# Patient Record
Sex: MALE | Race: WHITE | NOT HISPANIC OR LATINO | Employment: OTHER | URBAN - METROPOLITAN AREA
[De-identification: names, ages, dates, MRNs, and addresses within clinical notes are randomized per-mention and may not be internally consistent; named-entity substitution may affect disease eponyms.]

---

## 2017-06-16 ENCOUNTER — HOSPITAL ENCOUNTER (OUTPATIENT)
Dept: OTHER | Facility: HOSPITAL | Age: 42
Discharge: HOME | End: 2017-06-16
Attending: UROLOGY | Admitting: UROLOGY

## 2017-10-26 ENCOUNTER — HOSPITAL ENCOUNTER (OUTPATIENT)
Dept: OTHER | Facility: HOSPITAL | Age: 42
Discharge: HOME | End: 2017-10-26
Attending: FAMILY MEDICINE | Admitting: FAMILY MEDICINE

## 2017-12-11 ENCOUNTER — CONVERSION ENCOUNTER (OUTPATIENT)
Dept: FAMILY MEDICINE CLINIC | Facility: CLINIC | Age: 42
End: 2017-12-11

## 2017-12-11 LAB
25(OH)D3 SERPL-MCNC: 29 NG/ML (ref 30–100)
ALBUMIN SERPL-MCNC: 4 G/DL (ref 3.6–5.1)
ALP SERPL-CCNC: 61 U/L (ref 40–115)
ALT SERPL-CCNC: 61 U/L (ref 9–46)
AST SERPL-CCNC: 28 U/L (ref 10–40)
BASOPHILS # BLD AUTO: 68 CELLS/UL (ref 0–200)
BASOPHILS NFR BLD AUTO: 1.1 %
BILIRUB SERPL-MCNC: 0.5 MG/DL (ref 0.2–1.2)
BUN SERPL-MCNC: 10 MG/DL (ref 7–25)
BUN/CREAT SERPL: ABNORMAL (CALC) (ref 6–22)
CALCIUM SERPL-MCNC: 8.7 MG/DL (ref 8.6–10.3)
CHLORIDE SERPL-SCNC: 105 MMOL/L (ref 98–110)
CHOLEST SERPL-MCNC: 211 MG/DL
CHOLEST/HDLC SERPL: 4.8 (CALC)
CONV CO2: 28 MMOL/L (ref 20–31)
CONV TOTAL PROTEIN: 6.4 G/DL (ref 6.1–8.1)
CREAT UR-MCNC: 0.83 MG/DL (ref 0.6–1.35)
EOSINOPHIL # BLD AUTO: 149 CELLS/UL (ref 15–500)
EOSINOPHIL # BLD AUTO: 2.4 %
ERYTHROCYTE [DISTWIDTH] IN BLOOD BY AUTOMATED COUNT: 12.8 % (ref 11–15)
FOLATE SERPL-MCNC: 18.7 NG/ML
GLOBULIN UR ELPH-MCNC: 2.4 MG/DL (ref 1.9–3.7)
GLUCOSE UR QL: 113 MG/DL (ref 65–99)
HCT VFR BLD AUTO: 46.4 % (ref 38.5–50)
HDLC SERPL-MCNC: 44 MG/DL
HGB BLD-MCNC: 15.7 G/DL (ref 13.2–17.1)
INSULIN SERPL-ACNC: 1.7 (CALC) (ref 1–2.5)
LDLC SERPL CALC-MCNC: 130 MG/DL
LYMPHOCYTES # BLD AUTO: 2418 CELLS/UL (ref 850–3900)
LYMPHOCYTES NFR BLD AUTO: 39 %
MCH RBC QN AUTO: 30.1 PG (ref 27–33)
MCHC RBC AUTO-ENTMCNC: 33.8 G/DL (ref 32–36)
MCV RBC AUTO: 88.9 FL (ref 80–100)
MONOCYTES # BLD AUTO: 515 CELLS/UL (ref 200–950)
MONOCYTES NFR BLD AUTO: 8.3 %
NEUTROPHILS # BLD AUTO: 3050 CELLS/UL (ref 1500–7800)
NEUTROPHILS NFR BLD AUTO: 49.2 %
NONHDLC SERPL-MCNC: 167 MG/DL
PLATELET # BLD AUTO: 212 10*3/UL (ref 140–400)
PMV BLD AUTO: 8.9 FL (ref 7.5–12.5)
POTASSIUM SERPL-SCNC: 4 MMOL/L (ref 3.5–5.3)
RBC # BLD AUTO: 5.22 MILLION/UL (ref 4.2–5.8)
SODIUM SERPL-SCNC: 141 MMOL/L (ref 135–146)
T4 FREE SERPL-MCNC: 1.1 NG/DL (ref 0.8–1.8)
TESTOST FREE SERPL-MCNC: 43.2 PG/ML (ref 35–155)
TESTOST SERPL-MCNC: 485 NG/DL (ref 250–1100)
TRIGL SERPL-MCNC: 231 MG/DL
TSH SERPL-ACNC: 1.91 MIU/L (ref 0.4–4.5)
VIT B12 SERPL-MCNC: 504 PG/ML (ref 200–1100)
WBC # BLD AUTO: 6.2 10*3/UL (ref 3.8–10.8)

## 2019-07-09 ENCOUNTER — OFFICE VISIT (OUTPATIENT)
Dept: FAMILY MEDICINE CLINIC | Facility: CLINIC | Age: 44
End: 2019-07-09

## 2019-07-09 VITALS
RESPIRATION RATE: 19 BRPM | SYSTOLIC BLOOD PRESSURE: 138 MMHG | HEIGHT: 73 IN | BODY MASS INDEX: 41.75 KG/M2 | TEMPERATURE: 97.4 F | OXYGEN SATURATION: 95 % | HEART RATE: 93 BPM | DIASTOLIC BLOOD PRESSURE: 76 MMHG | WEIGHT: 315 LBS

## 2019-07-09 DIAGNOSIS — I10 ESSENTIAL HYPERTENSION: ICD-10-CM

## 2019-07-09 DIAGNOSIS — L57.0 AK (ACTINIC KERATOSIS): Primary | ICD-10-CM

## 2019-07-09 PROBLEM — I65.29 CAROTID ARTERY STENOSIS: Status: ACTIVE | Noted: 2019-07-09

## 2019-07-09 PROBLEM — F41.9 ANXIETY: Status: ACTIVE | Noted: 2019-07-09

## 2019-07-09 PROBLEM — M19.90 OSTEOARTHRITIS: Status: ACTIVE | Noted: 2019-07-09

## 2019-07-09 PROBLEM — K21.9 GERD (GASTROESOPHAGEAL REFLUX DISEASE): Status: ACTIVE | Noted: 2019-07-09

## 2019-07-09 PROCEDURE — 99213 OFFICE O/P EST LOW 20 MIN: CPT | Performed by: FAMILY MEDICINE

## 2019-07-09 PROCEDURE — 17000 DESTRUCT PREMALG LESION: CPT | Performed by: FAMILY MEDICINE

## 2019-07-09 RX ORDER — CETIRIZINE HYDROCHLORIDE 5 MG/1
5 TABLET ORAL
COMMUNITY
Start: 2016-04-27

## 2019-07-09 RX ORDER — ALBUTEROL SULFATE 90 UG/1
1 AEROSOL, METERED RESPIRATORY (INHALATION)
COMMUNITY
Start: 2017-06-21 | End: 2022-04-20

## 2019-07-09 RX ORDER — RANITIDINE 150 MG/1
TABLET ORAL
COMMUNITY
Start: 2018-04-06

## 2019-07-09 RX ORDER — SIMVASTATIN 20 MG
20 TABLET ORAL DAILY
Refills: 3 | COMMUNITY
Start: 2019-05-11 | End: 2019-11-03 | Stop reason: SDUPTHER

## 2019-07-09 RX ORDER — CARVEDILOL 25 MG/1
25 TABLET ORAL 2 TIMES DAILY
Refills: 2 | COMMUNITY
Start: 2019-05-11 | End: 2019-08-18 | Stop reason: SDUPTHER

## 2019-07-09 NOTE — PROGRESS NOTES
Subjective   Asad Odom is a 43 y.o. male.     Chief Complaint   Patient presents with   • Abrasion     spot on nose       Rash   This is a recurrent problem. The current episode started more than 1 year ago. The problem has been gradually worsening since onset. The affected locations include the face. The rash is characterized by draining and redness. He was exposed to nothing. Pertinent negatives include no shortness of breath.            I personally reviewed and updated the patient's allergies, medications, problem list, and past medical, surgical, social, and family history.     History reviewed. No pertinent family history.    Social History     Tobacco Use   • Smoking status: Former Smoker   • Tobacco comment: quit 8 years ago   Substance Use Topics   • Alcohol use: Yes     Frequency: Monthly or less   • Drug use: No       History reviewed. No pertinent surgical history.    Patient Active Problem List   Diagnosis   • Asthma   • Dyslipidemia   • Elevated serum tryptase   • Hypertension   • Mast cell disorder   • Nephrolithiasis   • Anxiety   • GERD (gastroesophageal reflux disease)   • Osteoarthritis   • Carotid artery stenosis   • AK (actinic keratosis)         Current Outpatient Medications:   •  albuterol sulfate  (90 Base) MCG/ACT inhaler, Inhale 1 puff., Disp: , Rfl:   •  aspirin 81 MG tablet, Take 81 mg by mouth., Disp: , Rfl:   •  carvedilol (COREG) 25 MG tablet, Take 25 mg by mouth 2 (Two) Times a Day., Disp: , Rfl: 2  •  cetirizine (zyrTEC) 5 MG tablet, Take 5 mg by mouth., Disp: , Rfl:   •  raNITIdine (ZANTAC) 150 MG tablet, TAKE 1 TABLET BY MOUTH TWICE DAILY, Disp: , Rfl:   •  simvastatin (ZOCOR) 20 MG tablet, Take 20 mg by mouth Daily., Disp: , Rfl: 3         Review of Systems   Constitutional: Negative for chills and diaphoresis.   Eyes: Negative for visual disturbance.   Respiratory: Negative for shortness of breath.    Cardiovascular: Negative for chest pain and palpitations.  "  Gastrointestinal: Negative for abdominal pain and nausea.   Endocrine: Negative for polydipsia and polyphagia.   Musculoskeletal: Negative for neck stiffness.   Skin: Positive for rash. Negative for color change and pallor.   Neurological: Negative for seizures and syncope.   Hematological: Negative for adenopathy.       Objective   /76   Pulse 93   Temp 97.4 °F (36.3 °C)   Resp 19   Ht 185.4 cm (73\")   Wt (!) 148 kg (326 lb 6.4 oz)   SpO2 95%   BMI 43.06 kg/m²   Physical Exam   Constitutional: He is oriented to person, place, and time. He appears well-developed and well-nourished.   Cardiovascular: Normal rate, regular rhythm, S1 normal, S2 normal, normal heart sounds, intact distal pulses and normal pulses. Exam reveals no gallop and no friction rub.   No murmur heard.  Pulmonary/Chest: Effort normal and breath sounds normal. No accessory muscle usage or stridor. He has no decreased breath sounds. He has no wheezes. He has no rhonchi. He has no rales.   Abdominal: Soft. Normal appearance, normal aorta and bowel sounds are normal. He exhibits no distension, no pulsatile midline mass and no mass. There is no hepatosplenomegaly. There is no tenderness. There is no rigidity, no rebound, no guarding, no CVA tenderness and negative Randolph's sign. No hernia.   Neurological: He is alert and oriented to person, place, and time. Coordination and gait normal.   Skin: Skin is warm and dry. Turgor is normal. He is not diaphoretic. No pallor.   Small ak nose     Cryotherapy, Skin Lesion  Date/Time: 7/13/2019 10:23 AM  Performed by: Pablo Noyola MD  Authorized by: Pablo Noyola MD   Preparation: Patient was prepped and draped in the usual sterile fashion.  Local anesthesia used: no    Anesthesia:  Local anesthesia used: no    Sedation:  Patient sedated: no    Patient tolerance: Patient tolerated the procedure well with no immediate complications              Assessment/Plan   Problem List Items Addressed This " Visit        Cardiovascular and Mediastinum    Hypertension    Relevant Medications    carvedilol (COREG) 25 MG tablet       Musculoskeletal and Integument    AK (actinic keratosis) - Primary    Current Assessment & Plan     Frozen today  follow up recheck  Consider repeat freezing, resection, if if persistent symptoms                    Expected course, medications, and adverse effects discussed.  Call or return if worsening or persistent symptoms.

## 2019-08-18 DIAGNOSIS — I65.29 STENOSIS OF CAROTID ARTERY, UNSPECIFIED LATERALITY: Primary | ICD-10-CM

## 2019-08-20 RX ORDER — CARVEDILOL 25 MG/1
TABLET ORAL
Qty: 60 TABLET | Refills: 0 | Status: SHIPPED | OUTPATIENT
Start: 2019-08-20 | End: 2019-09-15 | Stop reason: SDUPTHER

## 2019-09-15 DIAGNOSIS — I65.29 STENOSIS OF CAROTID ARTERY, UNSPECIFIED LATERALITY: ICD-10-CM

## 2019-09-16 RX ORDER — CARVEDILOL 25 MG/1
TABLET ORAL
Qty: 60 TABLET | Refills: 0 | Status: SHIPPED | OUTPATIENT
Start: 2019-09-16 | End: 2019-10-20 | Stop reason: SDUPTHER

## 2019-10-16 ENCOUNTER — OFFICE VISIT (OUTPATIENT)
Dept: FAMILY MEDICINE CLINIC | Facility: CLINIC | Age: 44
End: 2019-10-16

## 2019-10-16 VITALS
DIASTOLIC BLOOD PRESSURE: 72 MMHG | TEMPERATURE: 98.6 F | SYSTOLIC BLOOD PRESSURE: 140 MMHG | OXYGEN SATURATION: 96 % | HEIGHT: 73 IN | RESPIRATION RATE: 18 BRPM | HEART RATE: 78 BPM | BODY MASS INDEX: 41.75 KG/M2 | WEIGHT: 315 LBS

## 2019-10-16 DIAGNOSIS — E66.3 OVERWEIGHT: ICD-10-CM

## 2019-10-16 DIAGNOSIS — M19.90 ARTHRITIS: ICD-10-CM

## 2019-10-16 DIAGNOSIS — E78.5 DYSLIPIDEMIA: ICD-10-CM

## 2019-10-16 DIAGNOSIS — I10 ESSENTIAL HYPERTENSION: ICD-10-CM

## 2019-10-16 DIAGNOSIS — R79.89 ELEVATED LIVER FUNCTION TESTS: ICD-10-CM

## 2019-10-16 DIAGNOSIS — Z00.01 ANNUAL VISIT FOR GENERAL ADULT MEDICAL EXAMINATION WITH ABNORMAL FINDINGS: Primary | ICD-10-CM

## 2019-10-16 DIAGNOSIS — B96.89 ACUTE BACTERIAL BRONCHITIS: ICD-10-CM

## 2019-10-16 DIAGNOSIS — D47.09 MAST CELL DISORDER: ICD-10-CM

## 2019-10-16 DIAGNOSIS — J45.20 MILD INTERMITTENT ASTHMA WITHOUT COMPLICATION: ICD-10-CM

## 2019-10-16 DIAGNOSIS — J20.8 ACUTE BACTERIAL BRONCHITIS: ICD-10-CM

## 2019-10-16 DIAGNOSIS — Z87.891 HISTORY OF TOBACCO USE: ICD-10-CM

## 2019-10-16 LAB
BILIRUB BLD-MCNC: NEGATIVE MG/DL
CLARITY, POC: CLEAR
COLOR UR: YELLOW
GLUCOSE UR STRIP-MCNC: NEGATIVE MG/DL
KETONES UR QL: NEGATIVE
LEUKOCYTE EST, POC: NEGATIVE
NITRITE UR-MCNC: NEGATIVE MG/ML
PH UR: 5 [PH] (ref 5–8)
PROT UR STRIP-MCNC: NEGATIVE MG/DL
RBC # UR STRIP: ABNORMAL /UL
SP GR UR: 1.01 (ref 1–1.03)
UROBILINOGEN UR QL: NORMAL

## 2019-10-16 PROCEDURE — 99213 OFFICE O/P EST LOW 20 MIN: CPT | Performed by: FAMILY MEDICINE

## 2019-10-16 PROCEDURE — 96372 THER/PROPH/DIAG INJ SC/IM: CPT | Performed by: FAMILY MEDICINE

## 2019-10-16 PROCEDURE — 99396 PREV VISIT EST AGE 40-64: CPT | Performed by: FAMILY MEDICINE

## 2019-10-16 PROCEDURE — 81003 URINALYSIS AUTO W/O SCOPE: CPT | Performed by: FAMILY MEDICINE

## 2019-10-16 RX ORDER — CEPHALEXIN 500 MG/1
500 CAPSULE ORAL 3 TIMES DAILY
Qty: 30 CAPSULE | Refills: 0 | Status: SHIPPED | OUTPATIENT
Start: 2019-10-16 | End: 2019-10-26

## 2019-10-16 RX ORDER — METHYLPREDNISOLONE ACETATE 80 MG/ML
80 INJECTION, SUSPENSION INTRA-ARTICULAR; INTRALESIONAL; INTRAMUSCULAR; SOFT TISSUE ONCE
Status: COMPLETED | OUTPATIENT
Start: 2019-10-16 | End: 2019-10-16

## 2019-10-16 RX ADMIN — METHYLPREDNISOLONE ACETATE 80 MG: 80 INJECTION, SUSPENSION INTRA-ARTICULAR; INTRALESIONAL; INTRAMUSCULAR; SOFT TISSUE at 09:47

## 2019-10-18 LAB
ALBUMIN SERPL-MCNC: 4.3 G/DL (ref 3.5–5.5)
ALBUMIN/GLOB SERPL: 1.8 {RATIO} (ref 1.2–2.2)
ALP SERPL-CCNC: 83 IU/L (ref 39–117)
ALT SERPL-CCNC: 76 IU/L (ref 0–44)
ANA TITR SER IF: NEGATIVE {TITER}
AST SERPL-CCNC: 50 IU/L (ref 0–40)
BASOPHILS # BLD AUTO: 0.1 X10E3/UL (ref 0–0.2)
BASOPHILS NFR BLD AUTO: 1 %
BILIRUB SERPL-MCNC: 0.6 MG/DL (ref 0–1.2)
BUN SERPL-MCNC: 12 MG/DL (ref 6–24)
BUN/CREAT SERPL: 13 (ref 9–20)
CALCIUM SERPL-MCNC: 9.3 MG/DL (ref 8.7–10.2)
CHLORIDE SERPL-SCNC: 102 MMOL/L (ref 96–106)
CHOLEST SERPL-MCNC: 203 MG/DL (ref 100–199)
CHOLEST/HDLC SERPL: 6.5 RATIO (ref 0–5)
CO2 SERPL-SCNC: 27 MMOL/L (ref 20–29)
CREAT SERPL-MCNC: 0.91 MG/DL (ref 0.76–1.27)
CRP SERPL-MCNC: 7 MG/L (ref 0–10)
EOSINOPHIL # BLD AUTO: 0.3 X10E3/UL (ref 0–0.4)
EOSINOPHIL NFR BLD AUTO: 4 %
ERYTHROCYTE [DISTWIDTH] IN BLOOD BY AUTOMATED COUNT: 13.7 % (ref 12.3–15.4)
GLOBULIN SER CALC-MCNC: 2.4 G/DL (ref 1.5–4.5)
GLUCOSE SERPL-MCNC: 167 MG/DL (ref 65–99)
HCT VFR BLD AUTO: 47.5 % (ref 37.5–51)
HDLC SERPL-MCNC: 31 MG/DL
HGB BLD-MCNC: 16 G/DL (ref 13–17.7)
IMM GRANULOCYTES # BLD AUTO: 0 X10E3/UL (ref 0–0.1)
IMM GRANULOCYTES NFR BLD AUTO: 0 %
LDLC SERPL CALC-MCNC: ABNORMAL MG/DL (ref 0–99)
LYMPHOCYTES # BLD AUTO: 2.5 X10E3/UL (ref 0.7–3.1)
LYMPHOCYTES NFR BLD AUTO: 37 %
MCH RBC QN AUTO: 30.5 PG (ref 26.6–33)
MCHC RBC AUTO-ENTMCNC: 33.7 G/DL (ref 31.5–35.7)
MCV RBC AUTO: 91 FL (ref 79–97)
MONOCYTES # BLD AUTO: 0.6 X10E3/UL (ref 0.1–0.9)
MONOCYTES NFR BLD AUTO: 9 %
NEUTROPHILS # BLD AUTO: 3.4 X10E3/UL (ref 1.4–7)
NEUTROPHILS NFR BLD AUTO: 49 %
PLATELET # BLD AUTO: 194 X10E3/UL (ref 150–450)
POTASSIUM SERPL-SCNC: 4.6 MMOL/L (ref 3.5–5.2)
PROT SERPL-MCNC: 6.7 G/DL (ref 6–8.5)
RBC # BLD AUTO: 5.25 X10E6/UL (ref 4.14–5.8)
RHEUMATOID FACT SERPL-ACNC: <10 IU/ML (ref 0–13.9)
SODIUM SERPL-SCNC: 143 MMOL/L (ref 134–144)
TRIGL SERPL-MCNC: 541 MG/DL (ref 0–149)
TSH SERPL DL<=0.005 MIU/L-ACNC: 1.93 UIU/ML (ref 0.45–4.5)
URATE SERPL-MCNC: 4.7 MG/DL (ref 3.7–8.6)
VLDLC SERPL CALC-MCNC: ABNORMAL MG/DL (ref 5–40)
WBC # BLD AUTO: 6.8 X10E3/UL (ref 3.4–10.8)

## 2019-10-20 DIAGNOSIS — I65.29 STENOSIS OF CAROTID ARTERY, UNSPECIFIED LATERALITY: ICD-10-CM

## 2019-10-20 PROBLEM — R79.89 ELEVATED LIVER FUNCTION TESTS: Status: ACTIVE | Noted: 2019-10-20

## 2019-10-22 ENCOUNTER — TELEPHONE (OUTPATIENT)
Dept: FAMILY MEDICINE CLINIC | Facility: CLINIC | Age: 44
End: 2019-10-22

## 2019-10-22 RX ORDER — CARVEDILOL 25 MG/1
TABLET ORAL
Qty: 60 TABLET | Refills: 0 | Status: SHIPPED | OUTPATIENT
Start: 2019-10-22 | End: 2019-11-22 | Stop reason: SDUPTHER

## 2019-11-03 DIAGNOSIS — E78.5 DYSLIPIDEMIA: Primary | ICD-10-CM

## 2019-11-04 RX ORDER — SIMVASTATIN 20 MG
TABLET ORAL
Qty: 90 TABLET | Refills: 0 | Status: SHIPPED | OUTPATIENT
Start: 2019-11-04 | End: 2019-11-25 | Stop reason: DRUGHIGH

## 2019-11-20 ENCOUNTER — TELEPHONE (OUTPATIENT)
Dept: FAMILY MEDICINE CLINIC | Facility: CLINIC | Age: 44
End: 2019-11-20

## 2019-11-22 DIAGNOSIS — I65.29 STENOSIS OF CAROTID ARTERY, UNSPECIFIED LATERALITY: ICD-10-CM

## 2019-11-25 DIAGNOSIS — E78.5 DYSLIPIDEMIA: Primary | ICD-10-CM

## 2019-11-25 RX ORDER — CARVEDILOL 25 MG/1
25 TABLET ORAL 2 TIMES DAILY
Qty: 60 TABLET | Refills: 0 | Status: SHIPPED | OUTPATIENT
Start: 2019-11-25 | End: 2019-12-27 | Stop reason: SDUPTHER

## 2019-11-25 RX ORDER — SIMVASTATIN 40 MG
40 TABLET ORAL NIGHTLY
Qty: 30 TABLET | Refills: 12 | Status: SHIPPED | OUTPATIENT
Start: 2019-11-25 | End: 2021-02-01 | Stop reason: SDUPTHER

## 2019-11-25 NOTE — TELEPHONE ENCOUNTER
Let him know his cholesterol is worse this year, his triglycerides are back up into the 500s, want him to increase his simvastatin to 40 mg daily, send me a request for that, he can take 2 of his 20 mg simvastatin to use them up, otherwise everything else looks good, he has a mild elevation of his liver function tests from his fatty liver but that is stable from the last check, kidney function, blood sugar are normal, blood count is normal, test for autoimmune arthritis is negative, thanks

## 2019-11-25 NOTE — TELEPHONE ENCOUNTER
Pt's wife is calling and stated that he has called multiple times last week about his lab results and wants a call back today with his lab results.

## 2019-12-03 ENCOUNTER — OFFICE VISIT (OUTPATIENT)
Dept: FAMILY MEDICINE CLINIC | Facility: CLINIC | Age: 44
End: 2019-12-03

## 2019-12-03 VITALS
WEIGHT: 315 LBS | DIASTOLIC BLOOD PRESSURE: 100 MMHG | HEART RATE: 63 BPM | SYSTOLIC BLOOD PRESSURE: 144 MMHG | HEIGHT: 73 IN | OXYGEN SATURATION: 97 % | TEMPERATURE: 96.9 F | RESPIRATION RATE: 20 BRPM | BODY MASS INDEX: 41.75 KG/M2

## 2019-12-03 DIAGNOSIS — M54.50 ACUTE RIGHT-SIDED LOW BACK PAIN WITHOUT SCIATICA: Primary | ICD-10-CM

## 2019-12-03 PROCEDURE — 99213 OFFICE O/P EST LOW 20 MIN: CPT | Performed by: FAMILY MEDICINE

## 2019-12-03 PROCEDURE — 96372 THER/PROPH/DIAG INJ SC/IM: CPT | Performed by: FAMILY MEDICINE

## 2019-12-03 RX ORDER — CYCLOBENZAPRINE HCL 10 MG
TABLET ORAL
Qty: 30 TABLET | Refills: 2 | Status: SHIPPED | OUTPATIENT
Start: 2019-12-03 | End: 2020-09-11

## 2019-12-03 RX ORDER — MELOXICAM 15 MG/1
15 TABLET ORAL DAILY
Qty: 30 TABLET | Refills: 2 | Status: SHIPPED | OUTPATIENT
Start: 2019-12-03 | End: 2020-04-13 | Stop reason: SDUPTHER

## 2019-12-03 RX ORDER — METHYLPREDNISOLONE ACETATE 80 MG/ML
80 INJECTION, SUSPENSION INTRA-ARTICULAR; INTRALESIONAL; INTRAMUSCULAR; SOFT TISSUE ONCE
Status: COMPLETED | OUTPATIENT
Start: 2019-12-03 | End: 2019-12-03

## 2019-12-03 RX ORDER — KETOROLAC TROMETHAMINE 30 MG/ML
60 INJECTION, SOLUTION INTRAMUSCULAR; INTRAVENOUS ONCE
Status: COMPLETED | OUTPATIENT
Start: 2019-12-03 | End: 2019-12-03

## 2019-12-03 RX ADMIN — METHYLPREDNISOLONE ACETATE 80 MG: 80 INJECTION, SUSPENSION INTRA-ARTICULAR; INTRALESIONAL; INTRAMUSCULAR; SOFT TISSUE at 12:20

## 2019-12-03 RX ADMIN — KETOROLAC TROMETHAMINE 60 MG: 30 INJECTION, SOLUTION INTRAMUSCULAR; INTRAVENOUS at 12:19

## 2019-12-03 NOTE — PROGRESS NOTES
Subjective   Asad Odom is a 44 y.o. male.     Chief Complaint   Patient presents with   • Work Related Injury   • Back Pain       Back Pain   This is a new problem. The current episode started in the past 7 days. The problem occurs intermittently. The problem has been waxing and waning since onset. The pain is present in the lumbar spine. The quality of the pain is described as aching, stabbing and shooting. The symptoms are aggravated by stress, standing, sitting, twisting, position and coughing. Stiffness is present all day. Associated symptoms include paresthesias. Pertinent negatives include no abdominal pain, chest pain or paresis. (Pt states that he hurt his back why lifting at work) He has tried heat for the symptoms. The treatment provided mild relief.            I personally reviewed and updated the patient's allergies, medications, problem list, and past medical, surgical, social, and family history.     Family History   Problem Relation Age of Onset   • Other Mother         suicide   • Other Sister         Suicide sibilingd       Social History     Tobacco Use   • Smoking status: Former Smoker   • Smokeless tobacco: Former User     Types: Chew     Quit date: 7/16/2019   • Tobacco comment: quit 2 days ago   Substance Use Topics   • Alcohol use: Yes     Frequency: Monthly or less   • Drug use: No       History reviewed. No pertinent surgical history.    Patient Active Problem List   Diagnosis   • Asthma   • Dyslipidemia   • Elevated serum tryptase   • Hypertension   • Mast cell disorder   • Nephrolithiasis   • Anxiety   • GERD (gastroesophageal reflux disease)   • Osteoarthritis   • Carotid artery stenosis   • AK (actinic keratosis)   • Annual visit for general adult medical examination with abnormal findings   • History of tobacco use   • Overweight   • Arthritis   • Acute bacterial bronchitis   • Elevated liver function tests   • Acute right-sided low back pain without sciatica         Current  "Outpatient Medications:   •  albuterol sulfate  (90 Base) MCG/ACT inhaler, Inhale 1 puff., Disp: , Rfl:   •  aspirin 81 MG tablet, Take 81 mg by mouth., Disp: , Rfl:   •  carvedilol (COREG) 25 MG tablet, Take 1 tablet by mouth 2 (Two) Times a Day., Disp: 60 tablet, Rfl: 0  •  cetirizine (zyrTEC) 5 MG tablet, Take 5 mg by mouth., Disp: , Rfl:   •  raNITIdine (ZANTAC) 150 MG tablet, TAKE 1 TABLET BY MOUTH TWICE DAILY, Disp: , Rfl:   •  simvastatin (ZOCOR) 40 MG tablet, Take 1 tablet by mouth Every Night., Disp: 30 tablet, Rfl: 12  •  cyclobenzaprine (FLEXERIL) 10 MG tablet, 1/2 to 1 tablet nightly as needed as needed, Disp: 30 tablet, Rfl: 2  •  meloxicam (MOBIC) 15 MG tablet, Take 1 tablet by mouth Daily., Disp: 30 tablet, Rfl: 2         Review of Systems   Constitutional: Negative for chills and diaphoresis.   Eyes: Negative for visual disturbance.   Respiratory: Negative for shortness of breath.    Cardiovascular: Negative for chest pain and palpitations.   Gastrointestinal: Negative for abdominal pain and nausea.   Endocrine: Negative for polydipsia and polyphagia.   Musculoskeletal: Positive for back pain. Negative for neck stiffness.   Skin: Negative for color change and pallor.   Neurological: Positive for paresthesias. Negative for seizures and syncope.   Hematological: Negative for adenopathy.       Objective   /100   Pulse 63   Temp 96.9 °F (36.1 °C)   Resp 20   Ht 185.4 cm (73\")   Wt (!) 152 kg (336 lb 3.2 oz)   SpO2 97%   BMI 44.36 kg/m²   Wt Readings from Last 3 Encounters:   12/03/19 (!) 152 kg (336 lb 3.2 oz)   10/16/19 (!) 149 kg (327 lb 6.4 oz)   07/09/19 (!) 148 kg (326 lb 6.4 oz)     Physical Exam   Constitutional: He is oriented to person, place, and time. He appears well-developed and well-nourished.   Cardiovascular: Normal rate, regular rhythm, S1 normal, S2 normal, normal heart sounds, intact distal pulses and normal pulses. Exam reveals no gallop and no friction rub.   No " murmur heard.  Pulmonary/Chest: Effort normal and breath sounds normal. No accessory muscle usage or stridor. He has no decreased breath sounds. He has no wheezes. He has no rhonchi. He has no rales.   Abdominal: Soft. Normal appearance, normal aorta and bowel sounds are normal. He exhibits no distension, no pulsatile midline mass and no mass. There is no hepatosplenomegaly. There is no tenderness. There is no rigidity, no rebound, no guarding, no CVA tenderness and negative Randolph's sign. No hernia.   Musculoskeletal:        Right hip: He exhibits normal range of motion, normal strength, no tenderness, no swelling, no crepitus and no deformity.        Left hip: Normal. He exhibits normal range of motion, normal strength, no tenderness, no swelling, no crepitus and no deformity.        Thoracic back: Normal. He exhibits normal range of motion, no tenderness, no swelling, no edema, no deformity, no laceration and no spasm.        Lumbar back: He exhibits normal range of motion, no tenderness, no swelling, no edema, no deformity and no spasm.   Neurological: He is alert and oriented to person, place, and time. He has normal strength and normal reflexes. He displays no atrophy. No sensory deficit. He exhibits normal muscle tone. Coordination and gait normal.   Skin: Skin is warm and dry. Turgor is normal. He is not diaphoretic. No pallor.         Assessment/Plan       Low back pain.  Strain.  Ice, NSAIDs, rehabilitation exercises discussed.  Consider imaging, PT referral if persistent symptoms.  Risk and benefits of steroid Rx discussed.    Problem List Items Addressed This Visit        Nervous and Auditory    Acute right-sided low back pain without sciatica - Primary    Relevant Medications    ketorolac (TORADOL) injection 60 mg (Completed)    methylPREDNISolone acetate (DEPO-medrol) injection 80 mg (Completed)              Expected course, medications, and adverse effects discussed.  Call or return if worsening or  persistent symptoms.

## 2019-12-23 DIAGNOSIS — I65.29 STENOSIS OF CAROTID ARTERY, UNSPECIFIED LATERALITY: ICD-10-CM

## 2019-12-23 RX ORDER — CARVEDILOL 25 MG/1
TABLET ORAL
Qty: 60 TABLET | Refills: 0 | OUTPATIENT
Start: 2019-12-23

## 2019-12-27 ENCOUNTER — TELEPHONE (OUTPATIENT)
Dept: FAMILY MEDICINE CLINIC | Facility: CLINIC | Age: 44
End: 2019-12-27

## 2019-12-27 RX ORDER — CARVEDILOL PHOSPHATE 20 MG/1
20 CAPSULE, EXTENDED RELEASE ORAL DAILY
Qty: 30 CAPSULE | Refills: 0 | Status: SHIPPED | OUTPATIENT
Start: 2019-12-27 | End: 2019-12-28 | Stop reason: SDUPTHER

## 2019-12-28 DIAGNOSIS — I10 ESSENTIAL HYPERTENSION: Primary | ICD-10-CM

## 2019-12-28 RX ORDER — CARVEDILOL 25 MG/1
25 TABLET ORAL 2 TIMES DAILY WITH MEALS
Qty: 60 TABLET | Refills: 12 | Status: SHIPPED | OUTPATIENT
Start: 2019-12-28 | End: 2021-01-04

## 2019-12-30 DIAGNOSIS — B96.89 ACUTE BACTERIAL BRONCHITIS: Primary | ICD-10-CM

## 2019-12-30 DIAGNOSIS — J20.8 ACUTE BACTERIAL BRONCHITIS: Primary | ICD-10-CM

## 2019-12-30 RX ORDER — CEPHALEXIN 500 MG/1
500 CAPSULE ORAL 3 TIMES DAILY
Qty: 30 CAPSULE | Refills: 0 | Status: SHIPPED | OUTPATIENT
Start: 2019-12-30 | End: 2020-09-11

## 2020-03-03 DIAGNOSIS — N20.0 NEPHROLITHIASIS: Primary | ICD-10-CM

## 2020-03-03 RX ORDER — TAMSULOSIN HYDROCHLORIDE 0.4 MG/1
1 CAPSULE ORAL DAILY
Qty: 30 CAPSULE | Refills: 0 | Status: SHIPPED | OUTPATIENT
Start: 2020-03-03 | End: 2020-04-28 | Stop reason: SDUPTHER

## 2020-03-03 RX ORDER — TAMSULOSIN HYDROCHLORIDE 0.4 MG/1
1 CAPSULE ORAL DAILY
COMMUNITY
End: 2020-03-03 | Stop reason: SDUPTHER

## 2020-04-13 ENCOUNTER — OFFICE VISIT (OUTPATIENT)
Dept: FAMILY MEDICINE CLINIC | Facility: CLINIC | Age: 45
End: 2020-04-13

## 2020-04-13 ENCOUNTER — TELEPHONE (OUTPATIENT)
Dept: FAMILY MEDICINE CLINIC | Facility: CLINIC | Age: 45
End: 2020-04-13

## 2020-04-13 VITALS
HEIGHT: 73 IN | RESPIRATION RATE: 18 BRPM | HEART RATE: 75 BPM | BODY MASS INDEX: 41.75 KG/M2 | DIASTOLIC BLOOD PRESSURE: 89 MMHG | TEMPERATURE: 96.8 F | SYSTOLIC BLOOD PRESSURE: 150 MMHG | WEIGHT: 315 LBS | OXYGEN SATURATION: 95 %

## 2020-04-13 DIAGNOSIS — D47.09 MAST CELL DISORDER: ICD-10-CM

## 2020-04-13 DIAGNOSIS — N20.0 NEPHROLITHIASIS: ICD-10-CM

## 2020-04-13 DIAGNOSIS — Z87.891 HISTORY OF TOBACCO USE: ICD-10-CM

## 2020-04-13 DIAGNOSIS — E66.3 OVERWEIGHT: ICD-10-CM

## 2020-04-13 DIAGNOSIS — R10.9 FLANK PAIN: ICD-10-CM

## 2020-04-13 DIAGNOSIS — N30.00 ACUTE CYSTITIS WITHOUT HEMATURIA: Primary | ICD-10-CM

## 2020-04-13 DIAGNOSIS — I10 ESSENTIAL HYPERTENSION: ICD-10-CM

## 2020-04-13 PROCEDURE — 96372 THER/PROPH/DIAG INJ SC/IM: CPT | Performed by: FAMILY MEDICINE

## 2020-04-13 PROCEDURE — 99214 OFFICE O/P EST MOD 30 MIN: CPT | Performed by: FAMILY MEDICINE

## 2020-04-13 PROCEDURE — 81002 URINALYSIS NONAUTO W/O SCOPE: CPT | Performed by: FAMILY MEDICINE

## 2020-04-13 RX ORDER — MELOXICAM 15 MG/1
TABLET ORAL
Qty: 30 TABLET | Refills: 11 | Status: SHIPPED | OUTPATIENT
Start: 2020-04-13 | End: 2021-04-26

## 2020-04-13 RX ORDER — CEPHALEXIN 500 MG/1
500 CAPSULE ORAL 3 TIMES DAILY
Qty: 30 CAPSULE | Refills: 0 | Status: SHIPPED | OUTPATIENT
Start: 2020-04-13 | End: 2020-04-23

## 2020-04-13 RX ORDER — METHYLPREDNISOLONE ACETATE 80 MG/ML
80 INJECTION, SUSPENSION INTRA-ARTICULAR; INTRALESIONAL; INTRAMUSCULAR; SOFT TISSUE ONCE
Status: COMPLETED | OUTPATIENT
Start: 2020-04-13 | End: 2020-04-13

## 2020-04-13 RX ADMIN — METHYLPREDNISOLONE ACETATE 80 MG: 80 INJECTION, SUSPENSION INTRA-ARTICULAR; INTRALESIONAL; INTRAMUSCULAR; SOFT TISSUE at 17:32

## 2020-04-13 NOTE — PROGRESS NOTES
Subjective   Asad Odom is a 44 y.o. male.     Chief Complaint   Patient presents with   • Urinary Tract Infection   • Knee Pain       Urinary Tract Infection    This is a new problem. The current episode started 1 to 4 weeks ago. The problem occurs every urination. The problem has been gradually worsening. The quality of the pain is described as aching. The pain is moderate. There has been no fever. Associated symptoms include flank pain, hesitancy and urgency. Pertinent negatives include no chills or nausea. He has tried acetaminophen and NSAIDs for the symptoms. The treatment provided no relief. His past medical history is significant for kidney stones.   Knee Pain    The incident occurred more than 1 week ago. There was no injury mechanism. The pain is present in the left knee. The quality of the pain is described as aching and stabbing. The pain is moderate. The pain has been constant since onset. Associated symptoms include muscle weakness. Pertinent negatives include no inability to bear weight, loss of motion, loss of sensation, numbness or tingling. He reports no foreign bodies present. The symptoms are aggravated by movement. He has tried NSAIDs and acetaminophen for the symptoms. The treatment provided no relief.            I personally reviewed and updated the patient's allergies, medications, problem list, and past medical, surgical, social, and family history.     Family History   Problem Relation Age of Onset   • Other Mother         suicide   • Other Sister         Suicide sibilingd       Social History     Tobacco Use   • Smoking status: Former Smoker   • Smokeless tobacco: Former User     Types: Chew     Quit date: 7/16/2019   • Tobacco comment: quit 2 days ago   Substance Use Topics   • Alcohol use: Yes     Frequency: Monthly or less   • Drug use: No       History reviewed. No pertinent surgical history.    Patient Active Problem List   Diagnosis   • Asthma   • Dyslipidemia   • Elevated serum  tryptase   • Hypertension   • Mast cell disorder   • Nephrolithiasis   • Anxiety   • GERD (gastroesophageal reflux disease)   • Osteoarthritis   • Carotid artery stenosis   • AK (actinic keratosis)   • Annual visit for general adult medical examination with abnormal findings   • History of tobacco use   • Overweight   • Arthritis   • Acute bacterial bronchitis   • Elevated liver function tests   • Acute right-sided low back pain without sciatica   • Acute cystitis without hematuria         Current Outpatient Medications:   •  albuterol sulfate  (90 Base) MCG/ACT inhaler, Inhale 1 puff., Disp: , Rfl:   •  aspirin 81 MG tablet, Take 81 mg by mouth., Disp: , Rfl:   •  carvedilol (COREG) 25 MG tablet, Take 1 tablet by mouth 2 (Two) Times a Day With Meals., Disp: 60 tablet, Rfl: 12  •  cephalexin (KEFLEX) 500 MG capsule, Take 1 capsule by mouth 3 (Three) Times a Day., Disp: 30 capsule, Rfl: 0  •  cetirizine (zyrTEC) 5 MG tablet, Take 5 mg by mouth., Disp: , Rfl:   •  cyclobenzaprine (FLEXERIL) 10 MG tablet, 1/2 to 1 tablet nightly as needed as needed, Disp: 30 tablet, Rfl: 2  •  meloxicam (MOBIC) 15 MG tablet, Take 1 tablet daily as needed, Disp: 30 tablet, Rfl: 11  •  raNITIdine (ZANTAC) 150 MG tablet, TAKE 1 TABLET BY MOUTH TWICE DAILY, Disp: , Rfl:   •  simvastatin (ZOCOR) 40 MG tablet, Take 1 tablet by mouth Every Night., Disp: 30 tablet, Rfl: 12  •  tamsulosin (FLOMAX) 0.4 MG capsule 24 hr capsule, Take 1 capsule by mouth Daily., Disp: 30 capsule, Rfl: 0  •  cephalexin (KEFLEX) 500 MG capsule, Take 1 capsule by mouth 3 (Three) Times a Day for 10 days., Disp: 30 capsule, Rfl: 0         Review of Systems   Constitutional: Negative for chills and diaphoresis.   Eyes: Negative for visual disturbance.   Respiratory: Negative for shortness of breath.    Cardiovascular: Negative for chest pain and palpitations.   Gastrointestinal: Negative for abdominal pain and nausea.   Endocrine: Negative for polydipsia and  "polyphagia.   Genitourinary: Positive for flank pain, hesitancy and urgency.   Musculoskeletal: Negative for neck stiffness.   Skin: Negative for color change and pallor.   Neurological: Negative for tingling, seizures, syncope and numbness.   Hematological: Negative for adenopathy.   Psychiatric/Behavioral: Negative for hallucinations and suicidal ideas.       I have reviewed and confirmed the accuracy of the ROS as documented by the MA/LPN/RN Pablo Noyola MD      Objective   /89   Pulse 75   Temp 96.8 °F (36 °C)   Resp 18   Ht 185.4 cm (73\")   Wt (!) 149 kg (327 lb 12.8 oz)   SpO2 95%   BMI 43.25 kg/m²   Wt Readings from Last 3 Encounters:   04/13/20 (!) 149 kg (327 lb 12.8 oz)   12/03/19 (!) 152 kg (336 lb 3.2 oz)   10/16/19 (!) 149 kg (327 lb 6.4 oz)     Physical Exam   Constitutional: He is oriented to person, place, and time. He appears well-developed and well-nourished.   Cardiovascular: Normal rate, regular rhythm, S1 normal, S2 normal, normal heart sounds, intact distal pulses and normal pulses. Exam reveals no gallop and no friction rub.   No murmur heard.  Pulmonary/Chest: Effort normal and breath sounds normal. No accessory muscle usage or stridor. He has no decreased breath sounds. He has no wheezes. He has no rhonchi. He has no rales.   Abdominal: Soft. Normal appearance, normal aorta and bowel sounds are normal. He exhibits no distension, no pulsatile midline mass and no mass. There is no hepatosplenomegaly. There is no tenderness. There is no rigidity, no rebound, no guarding, no CVA tenderness and negative Randolph's sign. No hernia.   Musculoskeletal:        Right knee: Normal. He exhibits normal range of motion, no swelling, no effusion, no deformity, no erythema, no LCL laxity, normal patellar mobility and no MCL laxity. No tenderness found. No medial joint line, no lateral joint line, no MCL, no LCL and no patellar tendon tenderness noted.        Left knee: Normal. He exhibits " normal range of motion, no swelling, no effusion, no deformity, no erythema, no LCL laxity, normal patellar mobility and no MCL laxity. No tenderness found. No medial joint line, no lateral joint line, no MCL, no LCL and no patellar tendon tenderness noted.   Anterior drawer and Lachman neg, Jessica neg   Neurological: He is alert and oriented to person, place, and time. Coordination and gait normal.   Skin: Skin is warm and dry. Turgor is normal. He is not diaphoretic. No pallor.         Assessment/Plan     Nephrolithiasis.  4 mm left distal ureter per CT 4/20, multiple other stones bilateral kidneys 4 to 5 mm.  Increase fluid intake.  Flomax, antibiotics, pain medicine.  Prior history of stents.  Consider urology follow-up if unable to pass stone.  Knee pain.  Left, patellar tendinitis.  OA contributing.  IM steroids given.  Ice, NSAIDs, rehabilitation exercises discussed.  Call return if persistent symptoms.  Gallstone.  Small stone incidental finding per CT 4/20.  Asymptomatic.  Consider surgery referral if symptoms develop.  Hyperlipidemia.  Familial with significant elevated triglycerides.  Improved on statin.  Follow-up recheck.  Fatty liver.  With mild elevation LFTs.  Continue fish oil.  Continue to monitor.  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  Recommend hematology follow-up, offered second opinion from alternative hematologist he declines currently.  Low back pain.    Improved today.  Strain.  Ice, NSAIDs, rehabilitation exercises discussed.  Consider imaging, PT referral if persistent symptoms.  Risk and benefits of steroid Rx discussed.  Asthma.  Improved on pulmonary toilet.  Infrequent baseline inhaler use currently.  Hypertension.  Good control on carvedilol.  And benign 2012.  Discussed low-sodium diet.  Follow-up recheck.       Problem List Items Addressed This Visit        Unprioritized    Hypertension    Mast cell disorder    Relevant Medications    meloxicam  (MOBIC) 15 MG tablet    Nephrolithiasis    History of tobacco use    Overweight    Acute cystitis without hematuria - Primary    Relevant Orders    POCT urinalysis dipstick, manual    Urine Culture - Urine, Urine, Clean Catch (Completed)      Other Visit Diagnoses     Flank pain        Relevant Medications    methylPREDNISolone acetate (DEPO-medrol) injection 80 mg (Completed)    Other Relevant Orders    CT Abdomen Pelvis Stone Protocol              Expected course, medications, and adverse effects discussed.  Call or return if worsening or persistent symptoms.

## 2020-04-14 ENCOUNTER — DOCUMENTATION (OUTPATIENT)
Dept: FAMILY MEDICINE CLINIC | Facility: CLINIC | Age: 45
End: 2020-04-14

## 2020-04-14 ENCOUNTER — TELEPHONE (OUTPATIENT)
Dept: FAMILY MEDICINE CLINIC | Facility: CLINIC | Age: 45
End: 2020-04-14

## 2020-04-14 NOTE — TELEPHONE ENCOUNTER
keily states he will have a new job in about 3 weeks and he iwll have a lapse in insurance. Asad wants to know if he can just move forward with having it removed?

## 2020-04-14 NOTE — TELEPHONE ENCOUNTER
----- Message from Pablo Noyola MD sent at 4/14/2020  2:10 PM EDT -----  Let him know he is passing a 4 mm kidney stone on the left, it is about two thirds of the way down, this is one that he may be able to pass on his own, want him to keep drinking fluids, continue the antibiotic to prevent infection, I can send some pain medicine for him if he needs it, if he is unable to pass the stone in the next week or 2 or at any point if the pain is worse, he is having fever, next step would be to see the urologist to consider a stent.  He does have several similar sized stones in both kidneys.  He also has a very small gallstone in his gallbladder.  That this is not causing him any symptoms would be okay to watch this for a while, if he starts to develop pain in his right upper quadrant would recommend seeing a surgeon to consider removing his gallbladder, thanks

## 2020-04-15 ENCOUNTER — TELEPHONE (OUTPATIENT)
Dept: FAMILY MEDICINE CLINIC | Facility: CLINIC | Age: 45
End: 2020-04-15

## 2020-04-15 LAB
BACTERIA UR CULT: NORMAL
BACTERIA UR CULT: NORMAL

## 2020-04-17 ENCOUNTER — TELEPHONE (OUTPATIENT)
Dept: FAMILY MEDICINE CLINIC | Facility: CLINIC | Age: 45
End: 2020-04-17

## 2020-04-20 LAB
BILIRUB BLD-MCNC: NEGATIVE MG/DL
CLARITY, POC: CLEAR
COLOR UR: ABNORMAL
GLUCOSE UR STRIP-MCNC: NEGATIVE MG/DL
KETONES UR QL: ABNORMAL
LEUKOCYTE EST, POC: ABNORMAL
NITRITE UR-MCNC: NEGATIVE MG/ML
PH UR: 6 [PH] (ref 5–8)
PROT UR STRIP-MCNC: NEGATIVE MG/DL
RBC # UR STRIP: ABNORMAL /UL
SP GR UR: 1.01 (ref 1–1.03)
UROBILINOGEN UR QL: NORMAL

## 2020-04-28 ENCOUNTER — TELEPHONE (OUTPATIENT)
Dept: FAMILY MEDICINE CLINIC | Facility: CLINIC | Age: 45
End: 2020-04-28

## 2020-04-28 DIAGNOSIS — N20.0 NEPHROLITHIASIS: ICD-10-CM

## 2020-04-28 RX ORDER — TAMSULOSIN HYDROCHLORIDE 0.4 MG/1
1 CAPSULE ORAL DAILY
Qty: 30 CAPSULE | Refills: 0 | Status: SHIPPED | OUTPATIENT
Start: 2020-04-28 | End: 2020-05-29

## 2020-04-28 NOTE — TELEPHONE ENCOUNTER
Pt called and was requesting a refill on his Flomax 0.4mg due to trying to pass a kidney stone. Please send to [facility] in [place].

## 2020-05-01 ENCOUNTER — CLINICAL SUPPORT (OUTPATIENT)
Dept: FAMILY MEDICINE CLINIC | Facility: CLINIC | Age: 45
End: 2020-05-01

## 2020-05-01 VITALS
WEIGHT: 315 LBS | RESPIRATION RATE: 16 BRPM | BODY MASS INDEX: 41.75 KG/M2 | DIASTOLIC BLOOD PRESSURE: 85 MMHG | HEIGHT: 73 IN | SYSTOLIC BLOOD PRESSURE: 138 MMHG | OXYGEN SATURATION: 97 % | HEART RATE: 69 BPM

## 2020-05-01 DIAGNOSIS — Z02.4 ENCOUNTER FOR CDL (COMMERCIAL DRIVING LICENSE) EXAM: Primary | ICD-10-CM

## 2020-05-01 DIAGNOSIS — E66.3 OVERWEIGHT: ICD-10-CM

## 2020-05-01 DIAGNOSIS — Z87.891 HISTORY OF TOBACCO USE: ICD-10-CM

## 2020-05-01 LAB
BILIRUB BLD-MCNC: NEGATIVE MG/DL
CLARITY, POC: CLEAR
COLOR UR: YELLOW
GLUCOSE UR STRIP-MCNC: NEGATIVE MG/DL
KETONES UR QL: NEGATIVE
LEUKOCYTE EST, POC: NEGATIVE
NITRITE UR-MCNC: NEGATIVE MG/ML
PH UR: 6 [PH] (ref 5–8)
PROT UR STRIP-MCNC: NEGATIVE MG/DL
RBC # UR STRIP: ABNORMAL /UL
SP GR UR: 1.01 (ref 1–1.03)
UROBILINOGEN UR QL: NORMAL

## 2020-05-01 PROCEDURE — DOTPHY: Performed by: FAMILY MEDICINE

## 2020-05-01 PROCEDURE — 81002 URINALYSIS NONAUTO W/O SCOPE: CPT | Performed by: FAMILY MEDICINE

## 2020-05-01 RX ORDER — CEPHALEXIN 500 MG/1
500 CAPSULE ORAL 3 TIMES DAILY
Qty: 30 CAPSULE | Refills: 0 | Status: SHIPPED | OUTPATIENT
Start: 2020-05-01 | End: 2020-05-11

## 2020-05-01 NOTE — PROGRESS NOTES
Medical Examination      Subjective     Asad Odmo is a 44 y.o. male who presents today for a  fitness determination physical exam. The patient reports no problems.  The following portions of the patient's history were reviewed and updated as appropriate: allergies, current medications, past family history, past medical history, past social history, past surgical history and problem list.    I personally reviewed and updated the patient's allergies, medications, problem list, and past medical, surgical, social, and family history.     Family History   Problem Relation Age of Onset   • Other Mother         suicide   • Other Sister         Suicide sibilingd       Social History     Tobacco Use   • Smoking status: Former Smoker   • Smokeless tobacco: Former User     Types: Chew     Quit date: 7/16/2019   • Tobacco comment: quit 2 days ago   Substance Use Topics   • Alcohol use: Yes     Frequency: Monthly or less   • Drug use: No       History reviewed. No pertinent surgical history.    Patient Active Problem List   Diagnosis   • Asthma   • Dyslipidemia   • Elevated serum tryptase   • Hypertension   • Mast cell disorder   • Nephrolithiasis   • Anxiety   • GERD (gastroesophageal reflux disease)   • Osteoarthritis   • Carotid artery stenosis   • AK (actinic keratosis)   • Annual visit for general adult medical examination with abnormal findings   • History of tobacco use   • Overweight   • Arthritis   • Acute bacterial bronchitis   • Elevated liver function tests   • Acute right-sided low back pain without sciatica   • Acute cystitis without hematuria   • Encounter for CDL (commercial driving license) exam         Current Outpatient Medications:   •  albuterol sulfate  (90 Base) MCG/ACT inhaler, Inhale 1 puff., Disp: , Rfl:   •  aspirin 81 MG tablet, Take 81 mg by mouth., Disp: , Rfl:   •  carvedilol (COREG) 25 MG tablet, Take 1 tablet by mouth 2 (Two) Times a Day With  "Meals., Disp: 60 tablet, Rfl: 12  •  cephalexin (KEFLEX) 500 MG capsule, Take 1 capsule by mouth 3 (Three) Times a Day., Disp: 30 capsule, Rfl: 0  •  cetirizine (zyrTEC) 5 MG tablet, Take 5 mg by mouth., Disp: , Rfl:   •  cyclobenzaprine (FLEXERIL) 10 MG tablet, 1/2 to 1 tablet nightly as needed as needed, Disp: 30 tablet, Rfl: 2  •  meloxicam (MOBIC) 15 MG tablet, Take 1 tablet daily as needed, Disp: 30 tablet, Rfl: 11  •  raNITIdine (ZANTAC) 150 MG tablet, TAKE 1 TABLET BY MOUTH TWICE DAILY, Disp: , Rfl:   •  simvastatin (ZOCOR) 40 MG tablet, Take 1 tablet by mouth Every Night., Disp: 30 tablet, Rfl: 12  •  tamsulosin (FLOMAX) 0.4 MG capsule 24 hr capsule, Take 1 capsule by mouth Daily., Disp: 30 capsule, Rfl: 0  •  cephalexin (KEFLEX) 500 MG capsule, Take 1 capsule by mouth 3 (Three) Times a Day for 10 days., Disp: 30 capsule, Rfl: 0         Review of Systems   Constitutional: Negative for chills and diaphoresis.   HENT: Negative for trouble swallowing and voice change.    Eyes: Negative for visual disturbance.   Respiratory: Negative for shortness of breath.    Cardiovascular: Negative for chest pain and palpitations.   Gastrointestinal: Negative for abdominal pain and nausea.   Endocrine: Negative for polydipsia and polyphagia.   Genitourinary: Negative for hematuria.   Musculoskeletal: Negative for neck stiffness.   Skin: Negative for color change and pallor.   Allergic/Immunologic: Negative for immunocompromised state.   Neurological: Negative for seizures and syncope.   Hematological: Negative for adenopathy.   Psychiatric/Behavioral: Negative for sleep disturbance and suicidal ideas.       I have reviewed and confirmed the accuracy of the ROS as documented by the MA/LPN/RN Pablo Noyola MD      Objective   /85   Pulse 69   Resp 16   Ht 185.4 cm (73\")   Wt (!) 149 kg (327 lb 6.4 oz)   SpO2 97%   BMI 43.20 kg/m²   Wt Readings from Last 3 Encounters:   05/01/20 (!) 149 kg (327 lb 6.4 oz) "   04/13/20 (!) 149 kg (327 lb 12.8 oz)   12/03/19 (!) 152 kg (336 lb 3.2 oz)       Vision:   Uncorrected Corrected Horizontal Field of Vision   Right Eye 20/20  >85 degrees   Left Eye  20/20  >85 degrees   Both Eyes  20/20       Applicant can recognize and distinguish among traffic control signals and devices showing standard red, green, and lynette colors.         Monocular Vision?: No    Physical Exam   Constitutional: He is oriented to person, place, and time. He appears well-developed and well-nourished.   Cardiovascular: Normal rate, regular rhythm, S1 normal, S2 normal, normal heart sounds, intact distal pulses and normal pulses. Exam reveals no gallop and no friction rub.   No murmur heard.  Pulmonary/Chest: Effort normal and breath sounds normal. No accessory muscle usage or stridor. He has no decreased breath sounds. He has no wheezes. He has no rhonchi. He has no rales.   Abdominal: Soft. Normal appearance, normal aorta and bowel sounds are normal. He exhibits no distension, no pulsatile midline mass and no mass. There is no hepatosplenomegaly. There is no tenderness. There is no rigidity, no rebound, no guarding, no CVA tenderness and negative Randolph's sign. No hernia. Hernia confirmed negative in the right inguinal area and confirmed negative in the left inguinal area.   Genitourinary: Testes normal and penis normal. Right testis shows no mass, no swelling and no tenderness. Left testis shows no mass, no swelling and no tenderness.   Lymphadenopathy: No inguinal adenopathy noted on the right or left side.   Neurological: He is alert and oriented to person, place, and time. He has normal strength and normal reflexes. He displays no tremor. No cranial nerve deficit or sensory deficit. He exhibits normal muscle tone. He displays no seizure activity. Coordination and gait normal.   Skin: Skin is warm and dry. Turgor is normal. He is not diaphoretic. No pallor.         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  Hypertension under good control.  Nephrolithiasis.  4 mm left distal ureter per CT 4/20, multiple other stones bilateral kidneys 4 to 5 mm.  Increase fluid intake.  Flomax, antibiotics, pain medicine.  Prior history of stents.    Followed by urology, repeat imaging upcoming.  Knee pain.    Improving.  Left, patellar tendinitis.  OA contributing.  IM steroids given.  Ice, NSAIDs, rehabilitation exercises discussed.  Call return if persistent symptoms.  Gallstone.  Small stone incidental finding per CT 4/20.  Asymptomatic.  Consider surgery referral if symptoms develop.  Hyperlipidemia.  Familial with significant elevated triglycerides.  Improved on statin.  Follow-up recheck.  Fatty liver.  With mild elevation LFTs.  Continue fish oil.  Continue to monitor.  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  Recommend hematology follow-up, offered second opinion from alternative hematologist he declines currently.  Low back pain.    Improved today.  Strain.  Ice, NSAIDs, rehabilitation exercises discussed.  Consider imaging, PT referral if persistent symptoms.  Risk and benefits of steroid Rx discussed.  Asthma.  Improved on pulmonary toilet.  Infrequent baseline inhaler use currently.  Hypertension.  Good control on carvedilol.  And benign 2012.  Discussed low-sodium diet.  Follow-up recheck.    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  Call or return if worsening or persistent symptoms.

## 2020-05-28 DIAGNOSIS — N20.0 NEPHROLITHIASIS: ICD-10-CM

## 2020-05-29 RX ORDER — TAMSULOSIN HYDROCHLORIDE 0.4 MG/1
1 CAPSULE ORAL DAILY
Qty: 30 CAPSULE | Refills: 0 | Status: SHIPPED | OUTPATIENT
Start: 2020-05-29 | End: 2021-04-26

## 2020-07-13 ENCOUNTER — TELEPHONE (OUTPATIENT)
Dept: FAMILY MEDICINE CLINIC | Facility: CLINIC | Age: 45
End: 2020-07-13

## 2020-07-14 PROBLEM — K64.9 HEMORRHOIDS: Status: ACTIVE | Noted: 2020-07-14

## 2020-09-11 ENCOUNTER — HOSPITAL ENCOUNTER (OUTPATIENT)
Dept: GENERAL RADIOLOGY | Facility: HOSPITAL | Age: 45
Discharge: HOME | End: 2020-09-11
Admitting: FAMILY MEDICINE

## 2020-09-11 ENCOUNTER — OFFICE VISIT (OUTPATIENT)
Dept: FAMILY MEDICINE CLINIC | Facility: CLINIC | Age: 45
End: 2020-09-11

## 2020-09-11 VITALS
BODY MASS INDEX: 41.75 KG/M2 | RESPIRATION RATE: 18 BRPM | DIASTOLIC BLOOD PRESSURE: 72 MMHG | HEIGHT: 73 IN | HEART RATE: 91 BPM | WEIGHT: 315 LBS | OXYGEN SATURATION: 97 % | TEMPERATURE: 97.7 F | SYSTOLIC BLOOD PRESSURE: 128 MMHG

## 2020-09-11 DIAGNOSIS — M54.50 ACUTE RIGHT-SIDED LOW BACK PAIN WITHOUT SCIATICA: ICD-10-CM

## 2020-09-11 DIAGNOSIS — M54.50 ACUTE RIGHT-SIDED LOW BACK PAIN WITHOUT SCIATICA: Primary | ICD-10-CM

## 2020-09-11 PROBLEM — N30.00 ACUTE CYSTITIS WITHOUT HEMATURIA: Status: RESOLVED | Noted: 2020-04-13 | Resolved: 2020-09-11

## 2020-09-11 PROBLEM — E78.00 HYPERCHOLESTEROLEMIA: Status: ACTIVE | Noted: 2020-09-11

## 2020-09-11 PROBLEM — B96.89 ACUTE BACTERIAL BRONCHITIS: Status: RESOLVED | Noted: 2019-10-16 | Resolved: 2020-09-11

## 2020-09-11 PROBLEM — J20.8 ACUTE BACTERIAL BRONCHITIS: Status: RESOLVED | Noted: 2019-10-16 | Resolved: 2020-09-11

## 2020-09-11 PROCEDURE — 99213 OFFICE O/P EST LOW 20 MIN: CPT | Performed by: FAMILY MEDICINE

## 2020-09-11 PROCEDURE — 72100 X-RAY EXAM L-S SPINE 2/3 VWS: CPT

## 2020-09-11 RX ORDER — PREDNISONE 1 MG/1
5 TABLET ORAL DAILY
Qty: 45 TABLET | Refills: 0 | Status: SHIPPED | OUTPATIENT
Start: 2020-09-11 | End: 2021-04-26

## 2020-09-11 RX ORDER — TIZANIDINE 4 MG/1
4 TABLET ORAL NIGHTLY PRN
Qty: 14 TABLET | Refills: 0 | Status: SHIPPED | OUTPATIENT
Start: 2020-09-11 | End: 2021-04-26

## 2020-09-11 NOTE — PROGRESS NOTES
Subjective   Asad Odom is a 44 y.o. male.     Chief Complaint   Patient presents with   • Back Pain       Back Pain   This is a new problem. The current episode started 1 to 4 weeks ago (Around August 27th). The problem occurs constantly. The problem has been gradually worsening since onset. The pain is present in the gluteal (center of the back all the way down). The quality of the pain is described as stabbing and aching. The pain does not radiate. The pain is moderate. The pain is worse during the day. The symptoms are aggravated by bending, sitting, standing and twisting. Stiffness is present all day. Associated symptoms include headaches. Pertinent negatives include no abdominal pain, bladder incontinence, bowel incontinence, chest pain, dysuria, fever, leg pain, numbness or tingling. He has tried heat and ice (muscle relaxers, back and body basim) for the symptoms. The treatment provided mild relief.        The following portions of the patient's history were reviewed and updated as appropriate: allergies, current medications, past family history, past medical history, past social history, past surgical history and problem list.    Allergies:  Allergies   Allergen Reactions   • Symbicort [Budesonide-Formoterol Fumarate] Myalgia       Social History:  Social History     Socioeconomic History   • Marital status:      Spouse name: Not on file   • Number of children: Not on file   • Years of education: Not on file   • Highest education level: Not on file   Tobacco Use   • Smoking status: Former Smoker   • Smokeless tobacco: Former User     Types: Chew     Quit date: 7/16/2019   • Tobacco comment: quit 2 days ago   Substance and Sexual Activity   • Alcohol use: Yes     Frequency: Monthly or less   • Drug use: No   • Sexual activity: Defer       Family History:  Family History   Problem Relation Age of Onset   • Other Mother         suicide   • Other Sister         Suicide sibilingd       Past Medical  History :  Patient Active Problem List   Diagnosis   • Asthma   • Dyslipidemia   • Elevated serum tryptase   • Hypertension   • Mast cell disorder   • Nephrolithiasis   • Anxiety   • GERD (gastroesophageal reflux disease)   • Osteoarthritis   • Carotid artery stenosis   • AK (actinic keratosis)   • Annual visit for general adult medical examination with abnormal findings   • History of tobacco use   • Overweight   • Arthritis   • Elevated liver function tests   • Acute right-sided low back pain without sciatica   • Encounter for CDL (commercial driving license) exam   • Hemorrhoids   • Hypercholesterolemia       Medication List:    Current Outpatient Medications:   •  albuterol sulfate  (90 Base) MCG/ACT inhaler, Inhale 1 puff., Disp: , Rfl:   •  aspirin 81 MG tablet, Take 81 mg by mouth., Disp: , Rfl:   •  carvedilol (COREG) 25 MG tablet, Take 1 tablet by mouth 2 (Two) Times a Day With Meals., Disp: 60 tablet, Rfl: 12  •  cetirizine (zyrTEC) 5 MG tablet, Take 5 mg by mouth., Disp: , Rfl:   •  raNITIdine (ZANTAC) 150 MG tablet, TAKE 1 TABLET BY MOUTH TWICE DAILY, Disp: , Rfl:   •  simvastatin (ZOCOR) 40 MG tablet, Take 1 tablet by mouth Every Night., Disp: 30 tablet, Rfl: 12  •  tamsulosin (FLOMAX) 0.4 MG capsule 24 hr capsule, TAKE 1 CAPSULE BY MOUTH DAILY, Disp: 30 capsule, Rfl: 0  •  meloxicam (MOBIC) 15 MG tablet, Take 1 tablet daily as needed, Disp: 30 tablet, Rfl: 11  •  predniSONE (DELTASONE) 5 MG tablet, Take 1 tablet by mouth Daily. 40mg x 3 days, 20mg x 3 days, 10mg x 3 days, 5mg x 3 days, Disp: 45 tablet, Rfl: 0  •  tiZANidine (ZANAFLEX) 4 MG tablet, Take 1 tablet by mouth At Night As Needed for Muscle Spasms., Disp: 14 tablet, Rfl: 0    Past Surgical History:  History reviewed. No pertinent surgical history.    Review of Systems:  Review of Systems   Constitutional: Negative for activity change and fever.   HENT: Negative for ear pain, rhinorrhea, sinus pressure and voice change.    Eyes: Negative  "for visual disturbance.   Respiratory: Negative for cough and shortness of breath.    Cardiovascular: Negative for chest pain.   Gastrointestinal: Negative for abdominal pain, bowel incontinence, diarrhea, nausea and vomiting.   Endocrine: Negative for cold intolerance and heat intolerance.   Genitourinary: Negative for dysuria, frequency, urgency and urinary incontinence.   Musculoskeletal: Positive for back pain. Negative for arthralgias.   Skin: Negative for rash.   Neurological: Negative for tingling, syncope and numbness.   Hematological: Does not bruise/bleed easily.   Psychiatric/Behavioral: Negative for depressed mood. The patient is not nervous/anxious.        Physical Exam:  Vital Signs:  Visit Vitals  /72   Pulse 91   Temp 97.7 °F (36.5 °C)   Resp 18   Ht 185.4 cm (73\")   Wt (!) 147 kg (324 lb 3.2 oz)   SpO2 97%   BMI 42.77 kg/m²       Physical Exam   Constitutional: He is oriented to person, place, and time. He appears well-developed and well-nourished. He is cooperative. No distress.   Cardiovascular: Normal rate, regular rhythm, normal heart sounds and intact distal pulses. Exam reveals no gallop and no friction rub.   No murmur heard.  Pulmonary/Chest: Effort normal and breath sounds normal. No respiratory distress. He has no wheezes. He has no rales.   Musculoskeletal: He exhibits no edema or deformity.        Lumbar back: He exhibits tenderness and spasm. He exhibits normal range of motion.   Negative straight leg raise   Neurological: He is alert and oriented to person, place, and time. He displays normal reflexes. He exhibits normal muscle tone. Coordination normal.   Skin: Skin is warm and dry. He is not diaphoretic.   Psychiatric: He has a normal mood and affect.   Vitals reviewed.      Assessment and Plan:  Problem List Items Addressed This Visit        Nervous and Auditory    Acute right-sided low back pain without sciatica - Primary    Relevant Medications    predniSONE (DELTASONE) 5 MG " tablet    tiZANidine (ZANAFLEX) 4 MG tablet    Other Relevant Orders    XR Spine Lumbar 2 or 3 View        Ice three times a day for about 10-15 minutes for the first 1-2 days. Then may alternate heat and ice. Better body mechanics discussed. Home exercises discussed and hand out given. Discussed nsaids and if they can be taken. May need imaging and or PT if persists.  Discussed red flags, if there is severe pain, fever with pain, loss of movement in one or both legs pain, numbness in groin or both legs, trouble urinating or defecating on oneself, then patient is to go to the ER.       An After Visit Summary and PPPS were given to the patient.             I wore protective equipment throughout this patient encounter to include mask, gloves and eye protection. Hand hygiene was performed before donning protective equipment and after removal when leaving the room.

## 2020-09-15 ENCOUNTER — TELEPHONE (OUTPATIENT)
Dept: FAMILY MEDICINE CLINIC | Facility: CLINIC | Age: 45
End: 2020-09-15

## 2020-09-15 NOTE — TELEPHONE ENCOUNTER
----- Message from Alana Grajeda MA sent at 9/15/2020  5:08 PM EDT -----    ----- Message -----  From: Oumou Kay MD  Sent: 9/11/2020   5:49 PM EDT  To: Alana Grajeda MA    Xray is ok

## 2020-09-18 ENCOUNTER — OFFICE VISIT (OUTPATIENT)
Dept: FAMILY MEDICINE CLINIC | Facility: CLINIC | Age: 45
End: 2020-09-18

## 2020-09-18 VITALS
OXYGEN SATURATION: 96 % | TEMPERATURE: 97.3 F | WEIGHT: 315 LBS | HEART RATE: 84 BPM | HEIGHT: 73 IN | RESPIRATION RATE: 18 BRPM | BODY MASS INDEX: 41.75 KG/M2 | SYSTOLIC BLOOD PRESSURE: 138 MMHG | DIASTOLIC BLOOD PRESSURE: 84 MMHG

## 2020-09-18 DIAGNOSIS — M54.50 ACUTE RIGHT-SIDED LOW BACK PAIN WITHOUT SCIATICA: Primary | ICD-10-CM

## 2020-09-18 PROCEDURE — 99214 OFFICE O/P EST MOD 30 MIN: CPT | Performed by: FAMILY MEDICINE

## 2020-09-18 NOTE — PROGRESS NOTES
Subjective   Asad Odom is a 44 y.o. male.     Chief Complaint   Patient presents with   • Back Pain       Back Pain  This is a new problem. The current episode started more than 1 month ago (Around August 27th). The problem occurs constantly. The problem has been gradually worsening since onset. The pain is present in the gluteal (center of the back all the way down). The quality of the pain is described as stabbing and aching. The pain does not radiate. The pain is moderate. The pain is worse during the day. The symptoms are aggravated by standing, sitting, twisting and bending. Stiffness is present all day. Pertinent negatives include no abdominal pain, bladder incontinence, bowel incontinence, chest pain, dysuria, fever, headaches, leg pain, numbness or tingling. He has tried heat and ice (muscle relaxers, back and body basim) for the symptoms. The treatment provided mild relief.        The following portions of the patient's history were reviewed and updated as appropriate: allergies, current medications, past family history, past medical history, past social history, past surgical history and problem list.    Allergies:  Allergies   Allergen Reactions   • Symbicort [Budesonide-Formoterol Fumarate] Myalgia       Social History:  Social History     Socioeconomic History   • Marital status:      Spouse name: Not on file   • Number of children: Not on file   • Years of education: Not on file   • Highest education level: Not on file   Tobacco Use   • Smoking status: Former Smoker   • Smokeless tobacco: Former User     Types: Chew     Quit date: 7/16/2019   • Tobacco comment: quit 2 days ago   Substance and Sexual Activity   • Alcohol use: Yes     Frequency: Monthly or less   • Drug use: No   • Sexual activity: Defer       Family History:  Family History   Problem Relation Age of Onset   • Other Mother         suicide   • Other Sister         Suicide sibilingd       Past Medical History :  Patient Active  Problem List   Diagnosis   • Asthma   • Dyslipidemia   • Elevated serum tryptase   • Hypertension   • Mast cell disorder   • Nephrolithiasis   • Anxiety   • GERD (gastroesophageal reflux disease)   • Osteoarthritis   • Carotid artery stenosis   • AK (actinic keratosis)   • Annual visit for general adult medical examination with abnormal findings   • History of tobacco use   • Overweight   • Arthritis   • Elevated liver function tests   • Acute right-sided low back pain without sciatica   • Encounter for CDL (commercial driving license) exam   • Hemorrhoids   • Hypercholesterolemia       Medication List:    Current Outpatient Medications:   •  albuterol sulfate  (90 Base) MCG/ACT inhaler, Inhale 1 puff., Disp: , Rfl:   •  aspirin 81 MG tablet, Take 81 mg by mouth., Disp: , Rfl:   •  carvedilol (COREG) 25 MG tablet, Take 1 tablet by mouth 2 (Two) Times a Day With Meals., Disp: 60 tablet, Rfl: 12  •  cetirizine (zyrTEC) 5 MG tablet, Take 5 mg by mouth., Disp: , Rfl:   •  meloxicam (MOBIC) 15 MG tablet, Take 1 tablet daily as needed, Disp: 30 tablet, Rfl: 11  •  predniSONE (DELTASONE) 5 MG tablet, Take 1 tablet by mouth Daily. 40mg x 3 days, 20mg x 3 days, 10mg x 3 days, 5mg x 3 days, Disp: 45 tablet, Rfl: 0  •  raNITIdine (ZANTAC) 150 MG tablet, TAKE 1 TABLET BY MOUTH TWICE DAILY, Disp: , Rfl:   •  simvastatin (ZOCOR) 40 MG tablet, Take 1 tablet by mouth Every Night., Disp: 30 tablet, Rfl: 12  •  tamsulosin (FLOMAX) 0.4 MG capsule 24 hr capsule, TAKE 1 CAPSULE BY MOUTH DAILY, Disp: 30 capsule, Rfl: 0  •  tiZANidine (ZANAFLEX) 4 MG tablet, Take 1 tablet by mouth At Night As Needed for Muscle Spasms., Disp: 14 tablet, Rfl: 0    Past Surgical History:  History reviewed. No pertinent surgical history.    Review of Systems:  Review of Systems   Constitutional: Negative for activity change and fever.   HENT: Negative for ear pain, rhinorrhea, sinus pressure and voice change.    Eyes: Negative for visual disturbance.  "  Respiratory: Negative for cough and shortness of breath.    Cardiovascular: Negative for chest pain.   Gastrointestinal: Negative for abdominal pain, bowel incontinence, diarrhea, nausea and vomiting.   Endocrine: Negative for cold intolerance and heat intolerance.   Genitourinary: Negative for dysuria, frequency, urgency and urinary incontinence.   Musculoskeletal: Positive for back pain. Negative for arthralgias.   Skin: Negative for rash.   Neurological: Negative for tingling, syncope and numbness.   Hematological: Does not bruise/bleed easily.   Psychiatric/Behavioral: Negative for depressed mood. The patient is not nervous/anxious.        Physical Exam:  Vital Signs:  Visit Vitals  /84   Pulse 84   Temp 97.3 °F (36.3 °C)   Resp 18   Ht 185.4 cm (73\")   Wt (!) 144 kg (316 lb 12.8 oz)   SpO2 96%   BMI 41.80 kg/m²       Physical Exam   Constitutional: He is oriented to person, place, and time. He appears well-developed and well-nourished. He is cooperative. No distress.   Cardiovascular: Normal rate, regular rhythm and normal heart sounds. Exam reveals no gallop and no friction rub.   No murmur heard.  Pulmonary/Chest: Effort normal and breath sounds normal. No respiratory distress. He has no wheezes. He has no rales.   Musculoskeletal: No deformity.      Lumbar back: He exhibits tenderness and spasm. He exhibits normal range of motion.      Comments: Negative straight leg raise   Neurological: He is alert and oriented to person, place, and time. He displays normal reflexes. He exhibits normal muscle tone. Coordination normal.   Skin: Skin is warm and dry. He is not diaphoretic.   Vitals reviewed.      Assessment and Plan:  Problem List Items Addressed This Visit        Nervous and Auditory    Acute right-sided low back pain without sciatica - Primary    Current Assessment & Plan     Still with pain. His xray was normal. Conservative treatment has not helped. Will try ordering MRI as per his " request    Ortho referral put in and PT         Relevant Orders    MRI Lumbar Spine Without Contrast    Ambulatory Referral to Physical Therapy Evaluate and treat    Ambulatory Referral to Orthopedic Surgery (Completed)        Ice three times a day for about 10-15 minutes for the first 1-2 days. Then may alternate heat and ice. Better body mechanics discussed. Home exercises discussed and hand out given. Discussed nsaids and if they can be taken. May need imaging and or PT if persists.  Discussed red flags, if there is severe pain, fever with pain, loss of movement in one or both legs pain, numbness in groin or both legs, trouble urinating or defecating on oneself, then patient is to go to the ER.       An After Visit Summary and PPPS were given to the patient.           I wore protective equipment throughout this patient encounter to include mask, gloves and eye protection. Hand hygiene was performed before donning protective equipment and after removal when leaving the room.

## 2020-09-18 NOTE — ASSESSMENT & PLAN NOTE
Please fax the Rx for the sertraline.   Still with pain. His xray was normal. Conservative treatment has not helped. Will try ordering MRI as per his request    Ortho referral put in and PT

## 2020-09-21 ENCOUNTER — TREATMENT (OUTPATIENT)
Dept: PHYSICAL THERAPY | Facility: CLINIC | Age: 45
End: 2020-09-21

## 2020-09-21 DIAGNOSIS — M54.50 ACUTE RIGHT-SIDED LOW BACK PAIN WITHOUT SCIATICA: Primary | ICD-10-CM

## 2020-09-21 PROCEDURE — 97014 ELECTRIC STIMULATION THERAPY: CPT | Performed by: PHYSICAL THERAPIST

## 2020-09-21 PROCEDURE — 97162 PT EVAL MOD COMPLEX 30 MIN: CPT | Performed by: PHYSICAL THERAPIST

## 2020-09-21 PROCEDURE — 97140 MANUAL THERAPY 1/> REGIONS: CPT | Performed by: PHYSICAL THERAPIST

## 2020-09-21 PROCEDURE — 97110 THERAPEUTIC EXERCISES: CPT | Performed by: PHYSICAL THERAPIST

## 2020-09-22 NOTE — PROGRESS NOTES
"  Physical Therapy Initial Evaluation and Plan of Care    Patient: Asad Odom   : 1975  Diagnosis/ICD-10 Code:  Acute right-sided low back pain without sciatica [M54.5]  Referring practitioner: Oumou Kay MD  Date of Initial Visit: 2020  Today's Date: 2020  Patient seen for 1 sessions           Subjective Questionnaire: Oswestry: 52%  Initial back injury WC  Dec, took meds and recovered.   re injured using sledgehammer, now pain from mid thoracic to lumbar, feels like it needs popped.  Taking pain meds and mm relaxers.  Feels like low back wants to \"give out\".  Xray normal but MRI ordered but not approved yet by ins.  Patient to also have consult with Ruben Falcon MD      Subjective Evaluation    History of Present Illness  Date of onset: 2020  Mechanism of injury: Using sledgehammer at work      Patient Occupation:  [employer] Quality of life: good    Pain  Current pain ratin  Quality: burning, dull ache, sharp, knife-like and discomfort  Relieving factors: rest, medications and change in position  Aggravating factors: ambulation, prolonged positioning, standing, lifting and repetitive movement  Progression: no change    Hand dominance: right    Treatments  Previous treatment: medication  Patient Goals  Patient goals for therapy: decreased pain, return to sport/leisure activities, return to work and increased motion             Objective          Postural Observations    Additional Postural Observation Details  Moderate thoracic kyphosis with increased lumbar lordosis    Tenderness     Additional Tenderness Details  Just lateral to mid thoracic to right approximately T7    Active Range of Motion     Additional Active Range of Motion Details  Lumbar flex seated to 75 degrees but lumbar lordosis does not reverse with movement  Thoracic rotation is moderately limited at 30 degrees bilaterally    SLR negative for dural signs bilat at 80 degrees    Strength/Myotome " Testing     Additional Strength Details  No muscle testing performed due to levels of pain but recent hx does not indicate any myotome limits    Ambulation     Observational Gait     Additional Observational Gait Details  Walks with flexed posture          Assessment & Plan     Assessment  Impairments: abnormal or restricted ROM, activity intolerance, lacks appropriate home exercise program and pain with function  Assessment details: Suspect facet compression from rotational forces with use of sledgehammer in thoracic area and subsequent increased facet load in lumbar due to excessive lordosis.  Patient deconditioned after previous back injury and leads to increased joint loading due to poor mobility and postural imbalances.  Patient will benefit from use of modalities as needed to reduce pain and muscle guarding, active and passive stretching and proximal strength training for return to previous functional levels.  Treatment will include HEP  Prognosis: good  Functional Limitations: carrying objects, lifting, sleeping, walking, uncomfortable because of pain, sitting and standing  Goals  Plan Goals: STG  Initiate thoracic rotation ROM and lumbar flex ROM ex on visit 1  Pain report reduced to 4/10 in 1 week    LTG  Oswestry to 25% or less by discharge  Thoracic rotation bilat to 45 degrees without pain reports by DC  Ability to return to full work duties by discharge    Plan  Therapy options: will be seen for skilled physical therapy services  Planned modality interventions: electrical stimulation/Russian stimulation and thermotherapy (hydrocollator packs)  Planned therapy interventions: abdominal trunk stabilization, manual therapy, soft tissue mobilization, strengthening, stretching, joint mobilization and home exercise program  Treatment plan discussed with: patient  Plan details: Suggest PT 2 X weekly up to 15 visits as needed to reach above goals        Timed:         Manual Therapy:    15     mins  13160;      Therapeutic Exercise:    15     mins  45715;     Neuromuscular Anya:    0    mins  75976;    Therapeutic Activity:     0     mins  73109;     Gait Trainin     mins  04627;     Ultrasound:     0     mins  23714;    Ionto                               0    mins   94825    Un-Timed:  Electrical Stimulation:    15     mins  76356 ( );  Dry Needling     0     mins self-pay  Traction     0     mins 01893  Low Eval     0     Mins  24826  Mod Eval     20     Mins  74492  High Eval                       0     Mins  37460        Timed Treatment:   30   mins   Total Treatment:     65   mins    PT SIGNATURE: Lesley Son, ELLEN   DATE TREATMENT INITIATED: 2020    Initial Certification  Certification Period: 2020  I certify that the therapy services are furnished while this patient is under my care.  The services outlined above are required by this patient, and will be reviewed every 90 days.     PHYSICIAN: Oumou Kay MD      DATE:     Please sign and return via fax to [phone].. Thank you, [facility].

## 2020-09-24 ENCOUNTER — TREATMENT (OUTPATIENT)
Dept: PHYSICAL THERAPY | Facility: CLINIC | Age: 45
End: 2020-09-24

## 2020-09-24 DIAGNOSIS — M54.50 ACUTE RIGHT-SIDED LOW BACK PAIN WITHOUT SCIATICA: Primary | ICD-10-CM

## 2020-09-24 PROCEDURE — 97140 MANUAL THERAPY 1/> REGIONS: CPT | Performed by: PHYSICAL THERAPIST

## 2020-09-24 PROCEDURE — 97014 ELECTRIC STIMULATION THERAPY: CPT | Performed by: PHYSICAL THERAPIST

## 2020-09-24 PROCEDURE — 97110 THERAPEUTIC EXERCISES: CPT | Performed by: PHYSICAL THERAPIST

## 2020-09-24 NOTE — PROGRESS NOTES
Physical Therapy Daily Progress Note      Patient: Asad dOom   : 1975  Diagnosis/ICD-10 Code:  Acute right-sided low back pain without sciatica [M54.5]  Referring practitioner: Oumou Kay MD  Date of Initial Visit: Type: THERAPY  Noted: 2020  Today's Date: 2020  Patient seen for 2 sessions         Asad Odom reports: he had a really bad day on Tuesday stating he hardly got any sleep and any movement caused pain in the back and shot lightning down his back and leg.    Objective   See Exercise, Manual, and Modality Logs for complete treatment.     Assessment/Plan   Pt. Continues to have very limited movement this date and continues to have very poor tolerance to change in position from side lying to sitting or supine to sitting. Pt. Continues to have pain with palpation at T7 region and low back pain that varies with position and movement.     Progress per Plan of Care           Timed:         Manual Therapy:    20     mins  61459;     Therapeutic Exercise:    10     mins  01116;     Neuromuscular Anya:        mins  64792;    Therapeutic Activity:          mins  28529;     Gait Training:           mins  03252;     Ultrasound:          mins  58936;    Ionto                                   mins   18326  Self Care                            mins   01886  Canalith Repos                   mins  4209    Un-Timed:  Electrical Stimulation:    15     mins  38142 ( );  Dry Needling          mins self-pay  Traction          mins 87798  Low Eval          Mins  77281  Mod Eval          Mins  27641  High Eval                            Mins  28665    Timed Treatment:   30   mins   Total Treatment:     45   mins    Shikha Guerrero PTA  Physical Therapist Assistant License #[id number]

## 2020-09-28 ENCOUNTER — TREATMENT (OUTPATIENT)
Dept: PHYSICAL THERAPY | Facility: CLINIC | Age: 45
End: 2020-09-28

## 2020-09-28 DIAGNOSIS — M54.50 ACUTE RIGHT-SIDED LOW BACK PAIN WITHOUT SCIATICA: Primary | ICD-10-CM

## 2020-09-28 PROCEDURE — 97014 ELECTRIC STIMULATION THERAPY: CPT | Performed by: PHYSICAL THERAPIST

## 2020-09-28 PROCEDURE — 97110 THERAPEUTIC EXERCISES: CPT | Performed by: PHYSICAL THERAPIST

## 2020-09-28 PROCEDURE — 97140 MANUAL THERAPY 1/> REGIONS: CPT | Performed by: PHYSICAL THERAPIST

## 2020-09-28 NOTE — PROGRESS NOTES
Physical Therapy Daily Progress Note      Patient: Asad Odom   : 1975  Diagnosis/ICD-10 Code:  Acute right-sided low back pain without sciatica [M54.5]  Referring practitioner: Oumou Kay MD  Date of Initial Visit: Type: THERAPY  Noted: 2020  Today's Date: 2020  Patient seen for 3 sessions         Asad Odom reports: no new changes at this time in pain stating stretches help some short term but his pain depends mostly on how he moves and how longer he has been on his feet. Pt. Reports at times he has lightning strikes down his back particularly with transitions. Pt. States he hit a pot hole in the road and it [place] his back badly and it was very painful.    Objective   See Exercise, Manual, and Modality Logs for complete treatment.     Assessment/Plan   Pt. Continues to have poor tolerance to transition from seated to side lying or supine. Pt. Continues to have minimal relief from stretches but is able to complete them in small ranges. Pt. Was given green TB rows today for scapular base and thoracic strengthening to encourage better posture.    Progress per Plan of Care           Timed:         Manual Therapy:    20     mins  69503;     Therapeutic Exercise:    10     mins  90223;     Neuromuscular Anya:        mins  73784;    Therapeutic Activity:          mins  20588;     Gait Training:           mins  59361;     Ultrasound:          mins  50009;    Ionto                                   mins   91162  Self Care                            mins   92393  Canalith Repos                   mins  4209    Un-Timed:  Electrical Stimulation:    15     mins  60525 ( );  Dry Needling          mins self-pay  Traction          mins 33101  Low Eval          Mins  35056  Mod Eval          Mins  51682  High Eval                            Mins  77948    Timed Treatment:   30   mins   Total Treatment:     45   mins    Shikha Guerrero PTA  Physical Therapist Assistant License  #[id number]

## 2020-09-30 ENCOUNTER — TREATMENT (OUTPATIENT)
Dept: PHYSICAL THERAPY | Facility: CLINIC | Age: 45
End: 2020-09-30

## 2020-09-30 DIAGNOSIS — M54.50 ACUTE RIGHT-SIDED LOW BACK PAIN WITHOUT SCIATICA: Primary | ICD-10-CM

## 2020-09-30 PROCEDURE — 97140 MANUAL THERAPY 1/> REGIONS: CPT | Performed by: PHYSICAL THERAPIST

## 2020-09-30 PROCEDURE — 97014 ELECTRIC STIMULATION THERAPY: CPT | Performed by: PHYSICAL THERAPIST

## 2020-09-30 NOTE — PROGRESS NOTES
Physical Therapy Daily Progress Note      Patient: Asad Odom   : 1975  Diagnosis/ICD-10 Code:  Acute right-sided low back pain without sciatica [M54.5]  Referring practitioner: Oumou Kay MD  Date of Initial Visit: Type: THERAPY  Noted: 2020  Today's Date: 2020  Patient seen for 4 sessions         Asad Odom reports: his back still feels the same and movement is very difficult due to his pain. Pt. Reports an MRI was approved but has yet to be scheduled.    Objective   See Exercise, Manual, and Modality Logs for complete treatment.     Assessment/Plan   Pt. Continues to tolerate positional changes poorly but is tolerating stretches better at this time. Pt. Has pain with palpation in thoracic region this date. Pt. Completes mid rows exercise without complaints of pain today. Heat and estim were provided seated at this time due to pain with supine to sit.    Progress per Plan of Care           Timed:         Manual Therapy:    25     mins  32464;     Therapeutic Exercise:    5     mins  66324;     Neuromuscular Anya:        mins  43229;    Therapeutic Activity:          mins  54022;     Gait Training:           mins  91435;     Ultrasound:          mins  49395;    Ionto                                   mins   53844  Self Care                            mins   09625  Canalith Repos                   mins  4209    Un-Timed:  Electrical Stimulation:    15     mins  52715 ( );  Dry Needling          mins self-pay  Traction          mins 39163  Low Eval          Mins  70291  Mod Eval          Mins  24587  High Eval                            Mins  42328    Timed Treatment:   30   mins   Total Treatment:     45   mins    Shikha Guerrero PTA  Physical Therapist Assistant License #[id number]

## 2020-10-13 ENCOUNTER — TREATMENT (OUTPATIENT)
Dept: PHYSICAL THERAPY | Facility: CLINIC | Age: 45
End: 2020-10-13

## 2020-10-13 DIAGNOSIS — M54.50 ACUTE RIGHT-SIDED LOW BACK PAIN WITHOUT SCIATICA: Primary | ICD-10-CM

## 2020-10-13 PROCEDURE — 97110 THERAPEUTIC EXERCISES: CPT | Performed by: PHYSICAL THERAPIST

## 2020-10-13 PROCEDURE — 97014 ELECTRIC STIMULATION THERAPY: CPT | Performed by: PHYSICAL THERAPIST

## 2020-10-13 PROCEDURE — 97140 MANUAL THERAPY 1/> REGIONS: CPT | Performed by: PHYSICAL THERAPIST

## 2020-10-13 NOTE — PROGRESS NOTES
Physical Therapy Daily Progress Note      Patient: Asad Odom   : 1975  Diagnosis/ICD-10 Code:  Acute right-sided low back pain without sciatica [M54.5]   Problems Addressed this Visit        Nervous and Auditory    Acute right-sided low back pain without sciatica - Primary      Diagnoses       Codes Comments    Acute right-sided low back pain without sciatica    -  Primary ICD-10-CM: M54.5  ICD-9-CM: 724.2         Referring practitioner: Oumou Kay MD  Date of Initial Visit: Type: THERAPY  Noted: 2020  Today's Date: 10/13/2020    VISIT#: 5    Subjective : pt states he has taken muscle relaxer and benedryl. Has a R inner ear infection right now also. Reports less pain than normal during rolling on mat during session. Having pain along R shoulder blade and along R side of back from low back and up to shoulder blade.      Objective : Added open book thoracic/lumbar rotation to HEP. Added bridges and isometric mid rows in comfortable range to exercises this date. Tender to palpation along R medial scap border.  MRI: mild DDD through thoracic spine; lumbar discogenic degenerative changes throughout lumbar spine with central canal narrowing and neural foraminal narrowing at multiple levels, degenerative hypertrophic posterior facet changes throughout lumbar hema with neural foraminal narrowing at multiple levels.  See Exercise, Manual, and Modality Logs for complete treatment.     Assessment/Plan : Improved tolerance to positional changes this date. Continued pain at R side of low back and through thoracic spine. Good tolerance to gentle progression of ther ex this date. Needs continued spinal mobility/ROM and core strengthening.    Progress per Plan of Care         Timed:         Manual Therapy:    25     mins  31219;     Therapeutic Exercise:    15     mins  97792;     Neuromuscular Anya:        mins  00237;    Therapeutic Activity:          mins  15624;     Gait Training:           mins   50610;     Ultrasound:          mins  11659;    Ionto                                   mins   38013  Self Care                            mins   87276  Canalith Repos                   mins  40563    Un-Timed:  Electrical Stimulation:    15     mins  03482 ( );  Dry Needling          mins self-pay  Traction          mins 32779  Low Eval          Mins  16614  Mod Eval          Mins  66234  High Eval                            Mins  41163  Re-Eval                               mins  02208    Timed Treatment:   40   mins   Total Treatment:     55   mins    Agnes Levy, PT  Physical Therapist

## 2020-10-16 ENCOUNTER — TREATMENT (OUTPATIENT)
Dept: PHYSICAL THERAPY | Facility: CLINIC | Age: 45
End: 2020-10-16

## 2020-10-16 DIAGNOSIS — M54.50 ACUTE RIGHT-SIDED LOW BACK PAIN WITHOUT SCIATICA: Primary | ICD-10-CM

## 2020-10-16 PROCEDURE — 97140 MANUAL THERAPY 1/> REGIONS: CPT | Performed by: PHYSICAL THERAPIST

## 2020-10-16 PROCEDURE — 97110 THERAPEUTIC EXERCISES: CPT | Performed by: PHYSICAL THERAPIST

## 2020-10-16 PROCEDURE — 97014 ELECTRIC STIMULATION THERAPY: CPT | Performed by: PHYSICAL THERAPIST

## 2020-10-16 NOTE — PROGRESS NOTES
Physical Therapy Daily Progress Note      Patient: Asad Odom   : 1975  Diagnosis/ICD-10 Code:  Acute right-sided low back pain without sciatica [M54.5]   Problems Addressed this Visit        Nervous and Auditory    Acute right-sided low back pain without sciatica - Primary      Diagnoses       Codes Comments    Acute right-sided low back pain without sciatica    -  Primary ICD-10-CM: M54.5  ICD-9-CM: 724.2         Referring practitioner: Oumou Kay MD  Date of Initial Visit: Type: THERAPY  Noted: 2020  Today's Date: 10/16/2020    VISIT#: 6    Subjective : Pt states that he went to [facility] regarding ear/jaw pain that he was having at last visit, did not have ear infection but was started on antibiotic and is feeling much better in regards to jaw pain. Pt states his back is still very sore and having pain with all positional changes.      Objective : Added hooklying MIP to ther ex.     See Exercise, Manual, and Modality Logs for complete treatment.     Assessment/Plan : Mild decrease in pain with manual lumbar distraction in hooklying. Good tolerance to manual techniques with mild pain at end range R rotation. Will benefit from continued core strengthening to decrease pain during functional mobility.    Progress per Plan of Care         Timed:         Manual Therapy:    25     mins  34328;     Therapeutic Exercise:    15     mins  47854;     Neuromuscular Anya:        mins  38190;    Therapeutic Activity:          mins  95344;     Gait Training:           mins  00576;     Ultrasound:          mins  61826;    Ionto                                   mins   10894  Self Care                            mins   06824  Canalith Repos                   mins  63179    Un-Timed:  Electrical Stimulation:    15     mins  41230 ( );  Dry Needling          mins self-pay  Traction          mins 36939  Low Eval          Mins  50985  Mod Eval          Mins  07968  High Eval                             Mins  02868  Re-Eval                               mins  67208    Timed Treatment:   40   mins   Total Treatment:     55   mins    Agnes Levy, PT  Physical Therapist

## 2020-10-19 ENCOUNTER — TREATMENT (OUTPATIENT)
Dept: PHYSICAL THERAPY | Facility: CLINIC | Age: 45
End: 2020-10-19

## 2020-10-19 PROCEDURE — 97014 ELECTRIC STIMULATION THERAPY: CPT | Performed by: PHYSICAL THERAPIST

## 2020-10-19 PROCEDURE — 97140 MANUAL THERAPY 1/> REGIONS: CPT | Performed by: PHYSICAL THERAPIST

## 2020-10-19 PROCEDURE — 97110 THERAPEUTIC EXERCISES: CPT | Performed by: PHYSICAL THERAPIST

## 2020-10-19 NOTE — PROGRESS NOTES
Physical Therapy Daily Progress Note      Patient: Asad Odom   : 1975  Diagnosis/ICD-10 Code:  No primary diagnosis found.  Referring practitioner: Oumou Kay MD  Date of Initial Visit: Type: THERAPY  Noted: 2020  Today's Date: 10/19/2020  Patient seen for 7 sessions         Asad Odom reports: he has been having frequent moments at home depending on how he moves or is positioned where it feels like he is being punched in the back. Pt. States the pain has fluctuated frequently.    Objective   See Exercise, Manual, and Modality Logs for complete treatment.     Assessment/Plan   Pt. Has poor tolerance to exercise and manual intervention this date and poor tolerance to LE mobility while lying supine on mat. Pt. Is unable to lift LE's onto support wedge due to low back pain intensifying. Pt. Completes all activity and reports no pain with standing activities only supine ones.    Progress per Plan of Care           Timed:         Manual Therapy:    20     mins  93874;     Therapeutic Exercise:    20     mins  02367;     Neuromuscular Anya:        mins  73704;    Therapeutic Activity:          mins  11924;     Gait Training:           mins  62539;     Ultrasound:          mins  54702;    Ionto                                   mins   82262  Self Care                            mins   81196  Canalith Repos                   mins  4209    Un-Timed:  Electrical Stimulation:    15     mins  54099 ( );  Dry Needling          mins self-pay  Traction          mins 32244  Low Eval          Mins  77840  Mod Eval          Mins  69303  High Eval                            Mins  63494    Timed Treatment:   40   mins   Total Treatment:     55   mins    Shikha Guerrero PTA  Physical Therapist Assistant License #[id number]

## 2020-10-20 ENCOUNTER — OFFICE VISIT (OUTPATIENT)
Dept: FAMILY MEDICINE CLINIC | Facility: CLINIC | Age: 45
End: 2020-10-20

## 2020-10-20 VITALS
DIASTOLIC BLOOD PRESSURE: 90 MMHG | BODY MASS INDEX: 41.22 KG/M2 | TEMPERATURE: 97.7 F | HEART RATE: 72 BPM | SYSTOLIC BLOOD PRESSURE: 138 MMHG | HEIGHT: 73 IN | WEIGHT: 311 LBS | OXYGEN SATURATION: 97 % | RESPIRATION RATE: 20 BRPM

## 2020-10-20 DIAGNOSIS — Z00.01 ANNUAL VISIT FOR GENERAL ADULT MEDICAL EXAMINATION WITH ABNORMAL FINDINGS: Primary | ICD-10-CM

## 2020-10-20 DIAGNOSIS — Z87.891 HISTORY OF TOBACCO USE: ICD-10-CM

## 2020-10-20 DIAGNOSIS — E66.01 CLASS 3 SEVERE OBESITY DUE TO EXCESS CALORIES WITH SERIOUS COMORBIDITY AND BODY MASS INDEX (BMI) OF 40.0 TO 44.9 IN ADULT (HCC): ICD-10-CM

## 2020-10-20 DIAGNOSIS — E78.5 DYSLIPIDEMIA: ICD-10-CM

## 2020-10-20 DIAGNOSIS — R53.81 MALAISE AND FATIGUE: ICD-10-CM

## 2020-10-20 DIAGNOSIS — R53.83 MALAISE AND FATIGUE: ICD-10-CM

## 2020-10-20 DIAGNOSIS — I10 ESSENTIAL HYPERTENSION: Chronic | ICD-10-CM

## 2020-10-20 DIAGNOSIS — R30.0 DYSURIA: ICD-10-CM

## 2020-10-20 PROBLEM — E66.813 CLASS 3 SEVERE OBESITY DUE TO EXCESS CALORIES WITH SERIOUS COMORBIDITY AND BODY MASS INDEX (BMI) OF 40.0 TO 44.9 IN ADULT: Status: ACTIVE | Noted: 2019-10-16

## 2020-10-20 LAB
BILIRUB BLD-MCNC: NEGATIVE MG/DL
CLARITY, POC: CLEAR
COLOR UR: YELLOW
GLUCOSE UR STRIP-MCNC: ABNORMAL MG/DL
KETONES UR QL: NEGATIVE
LEUKOCYTE EST, POC: NEGATIVE
NITRITE UR-MCNC: NEGATIVE MG/ML
PH UR: 5 [PH] (ref 5–8)
PROT UR STRIP-MCNC: ABNORMAL MG/DL
RBC # UR STRIP: ABNORMAL /UL
SP GR UR: 1.02 (ref 1–1.03)
UROBILINOGEN UR QL: NORMAL

## 2020-10-20 PROCEDURE — 99396 PREV VISIT EST AGE 40-64: CPT | Performed by: FAMILY MEDICINE

## 2020-10-20 PROCEDURE — 81003 URINALYSIS AUTO W/O SCOPE: CPT | Performed by: FAMILY MEDICINE

## 2020-10-20 PROCEDURE — 99213 OFFICE O/P EST LOW 20 MIN: CPT | Performed by: FAMILY MEDICINE

## 2020-10-20 RX ORDER — CYCLOBENZAPRINE HCL 10 MG
TABLET ORAL
COMMUNITY
Start: 2020-09-30 | End: 2021-04-26

## 2020-10-20 RX ORDER — AMOXICILLIN AND CLAVULANATE POTASSIUM 875; 125 MG/1; MG/1
TABLET, FILM COATED ORAL
COMMUNITY
Start: 2020-10-14 | End: 2021-04-26

## 2020-10-20 RX ORDER — TRAMADOL HYDROCHLORIDE 50 MG/1
TABLET ORAL
COMMUNITY
Start: 2020-10-14 | End: 2021-04-26

## 2020-10-20 NOTE — PROGRESS NOTES
Subjective   Asad Odom is a 45 y.o. male.     Chief Complaint   Patient presents with   • Annual Exam   • Hypertension   • Hyperlipidemia       The patient is here: to discuss health maintenance and disease prevention to follow up on multiple medical problems.  Last comprehensive physical was on 10/16/2019.  Previous physical was performed by  Pablo Noyola MD  Overall has: moderate activity with work/home activities, good appetite, feels well with minor complaints, decreased energy level and is sleeping poorly. Nutrition: appropriate balanced diet, balanced diet and eating a variety of foods. Last tetanus shot was unknown. Patient's last DEXA Scan was: 2/27/2013. Patient's last carotid doppler was: 9/11/2015 Patient's last stress test was: 4/30/2012    Hypertension  This is a chronic problem. The current episode started more than 1 year ago. The problem is unchanged. The problem is controlled. Associated symptoms include headaches. Pertinent negatives include no chest pain, palpitations or shortness of breath. Risk factors for coronary artery disease include dyslipidemia, male gender, obesity and smoking/tobacco exposure. Current antihypertension treatment includes beta blockers. The current treatment provides moderate improvement. There are no compliance problems.    Hyperlipidemia  This is a chronic problem. The current episode started more than 1 year ago. Recent lipid tests were reviewed and are high. Exacerbating diseases include obesity. Pertinent negatives include no chest pain or shortness of breath. Current antihyperlipidemic treatment includes statins. There are no compliance problems.  Risk factors for coronary artery disease include dyslipidemia, hypertension and male sex.        Recent Hospitalizations:  No hospitalization(s) within the last year..  ccc      I personally reviewed and updated the patient's allergies, medications, problem list, and past medical, surgical, social, and family  history. I have reviewed and confirmed the accuracy of the HPI and ROS as documented by the MA/LPN/RN Pablo Noyola MD    Family History   Problem Relation Age of Onset   • Other Mother         suicide   • Other Sister         Suicide sibilingd       Social History     Tobacco Use   • Smoking status: Former Smoker     Types: Cigarettes, Cigars     Quit date:      Years since quittin.8   • Smokeless tobacco: Former User     Types: Chew     Quit date: 2019   Substance Use Topics   • Alcohol use: Yes     Frequency: Monthly or less   • Drug use: No       History reviewed. No pertinent surgical history.    Patient Active Problem List   Diagnosis   • Asthma   • Dyslipidemia   • Elevated serum tryptase   • Essential hypertension   • Mast cell disorder   • Nephrolithiasis   • Anxiety   • GERD (gastroesophageal reflux disease)   • Osteoarthritis   • Carotid artery stenosis   • AK (actinic keratosis)   • Annual visit for general adult medical examination with abnormal findings   • History of tobacco use   • Class 3 severe obesity due to excess calories with serious comorbidity and body mass index (BMI) of 40.0 to 44.9 in adult (CMS/[facility])   • Arthritis   • Elevated liver function tests   • Acute right-sided low back pain without sciatica   • Encounter for CDL (commercial driving license) exam   • Hemorrhoids   • Hypercholesterolemia         Current Outpatient Medications:   •  albuterol sulfate  (90 Base) MCG/ACT inhaler, Inhale 1 puff., Disp: , Rfl:   •  aspirin 81 MG tablet, Take 81 mg by mouth., Disp: , Rfl:   •  carvedilol (COREG) 25 MG tablet, Take 1 tablet by mouth 2 (Two) Times a Day With Meals., Disp: 60 tablet, Rfl: 12  •  cetirizine (zyrTEC) 5 MG tablet, Take 5 mg by mouth., Disp: , Rfl:   •  cyclobenzaprine (FLEXERIL) 10 MG tablet, TK 1 T PO NIGHTLY PRF MSP, Disp: , Rfl:   •  meloxicam (MOBIC) 15 MG tablet, Take 1 tablet daily as needed, Disp: 30 tablet, Rfl: 11  •  simvastatin (ZOCOR) 40 MG  "tablet, Take 1 tablet by mouth Every Night., Disp: 30 tablet, Rfl: 12  •  tamsulosin (FLOMAX) 0.4 MG capsule 24 hr capsule, TAKE 1 CAPSULE BY MOUTH DAILY, Disp: 30 capsule, Rfl: 0  •  tiZANidine (ZANAFLEX) 4 MG tablet, Take 1 tablet by mouth At Night As Needed for Muscle Spasms., Disp: 14 tablet, Rfl: 0  •  traMADol (ULTRAM) 50 MG tablet, TK 1 T PO Q 4 TO 6 H PRN P, Disp: , Rfl:   •  amoxicillin-clavulanate (AUGMENTIN) 875-125 MG per tablet, TK 1 T PO Q 12 H FOR 10 DAYS, Disp: , Rfl:   •  predniSONE (DELTASONE) 5 MG tablet, Take 1 tablet by mouth Daily. 40mg x 3 days, 20mg x 3 days, 10mg x 3 days, 5mg x 3 days, Disp: 45 tablet, Rfl: 0  •  raNITIdine (ZANTAC) 150 MG tablet, TAKE 1 TABLET BY MOUTH TWICE DAILY, Disp: , Rfl:          Review of Systems   Constitutional: Negative for chills and diaphoresis.   HENT: Negative for trouble swallowing and voice change.    Eyes: Negative for visual disturbance.   Respiratory: Negative for shortness of breath.    Cardiovascular: Negative for chest pain and palpitations.   Gastrointestinal: Negative for abdominal pain and nausea.   Endocrine: Negative for polydipsia and polyphagia.   Genitourinary: Negative for hematuria.   Musculoskeletal: Negative for neck stiffness.   Skin: Negative for color change and pallor.   Allergic/Immunologic: Negative for immunocompromised state.   Neurological: Negative for seizures and syncope.   Hematological: Negative for adenopathy.   Psychiatric/Behavioral: Negative for hallucinations, sleep disturbance and suicidal ideas.       I have reviewed and confirmed the accuracy of the ROS as documented by the MA/LPN/RN Pablo Noyola MD      Objective   /90 (BP Location: Right arm, Patient Position: Sitting, Cuff Size: Adult)   Pulse 72   Temp 97.7 °F (36.5 °C)   Resp 20   Ht 185.4 cm (73\")   Wt (!) 141 kg (311 lb)   SpO2 97%   BMI 41.03 kg/m²   BP Readings from Last 3 Encounters:   10/20/20 138/90   09/18/20 138/84   09/11/20 128/72 "     Wt Readings from Last 3 Encounters:   10/20/20 (!) 141 kg (311 lb)   09/18/20 (!) 144 kg (316 lb 12.8 oz)   09/11/20 (!) 147 kg (324 lb 3.2 oz)     Physical Exam  Constitutional:       Appearance: He is well-developed. He is not diaphoretic.   HENT:      Head: Normocephalic.      Right Ear: Tympanic membrane, ear canal and external ear normal.      Left Ear: Tympanic membrane, ear canal and external ear normal.      Nose: Nose normal.   Eyes:      General: Lids are normal.      Conjunctiva/sclera: Conjunctivae normal.      Pupils: Pupils are equal, round, and reactive to light.   Neck:      Thyroid: No thyromegaly.      Vascular: No carotid bruit or JVD.      Trachea: No tracheal deviation.   Cardiovascular:      Rate and Rhythm: Normal rate and regular rhythm.      Heart sounds: Normal heart sounds. No murmur. No friction rub. No gallop.    Pulmonary:      Effort: Pulmonary effort is normal.      Breath sounds: Normal breath sounds. No stridor. No decreased breath sounds, wheezing or rales.   Abdominal:      General: Bowel sounds are normal. There is no distension.      Palpations: Abdomen is soft. There is no mass.      Tenderness: There is no abdominal tenderness. There is no guarding or rebound.      Hernia: No hernia is present.   Lymphadenopathy:      Head:      Right side of head: No submental, submandibular, tonsillar, preauricular, posterior auricular or occipital adenopathy.      Left side of head: No submental, submandibular, tonsillar, preauricular, posterior auricular or occipital adenopathy.      Cervical: No cervical adenopathy.   Skin:     General: Skin is warm and dry.      Coloration: Skin is not pale.   Neurological:      Mental Status: He is alert and oriented to person, place, and time.      Cranial Nerves: No cranial nerve deficit.      Sensory: No sensory deficit.      Coordination: Coordination normal.      Gait: Gait normal.      Deep Tendon Reflexes: Reflexes are normal and symmetric.          Recent Lab Results:    No results found for: HGBA1C  Lab Results   Component Value Date     (H) 10/16/2019     Lab Results   Component Value Date    LDL Comment 10/16/2019     (H) 12/11/2017    LDL 87 09/24/2016     Lab Results   Component Value Date    CHOL 223 (H) 09/25/2018    CHOL 211 (H) 12/11/2017    CHOL 197 09/24/2016     Lab Results   Component Value Date    TRIG 541 (H) 10/16/2019    TRIG 428 (H) 09/25/2018    TRIG 231 (H) 12/11/2017     Lab Results   Component Value Date    HDL 31 (L) 10/16/2019    HDL 39 (L) 09/25/2018    HDL 44 12/11/2017     No results found for: PSA  Lab Results   Component Value Date    WBC 6.8 10/16/2019    HGB 16.0 10/16/2019    HCT 47.5 10/16/2019    MCV 91 10/16/2019     10/16/2019     Lab Results   Component Value Date    TSH 1.930 10/16/2019      Lab Results   Component Value Date    BUN 12 10/16/2019    CREATININE 0.91 10/16/2019    EGFRIFNONA 101 10/16/2019    EGFRIFAFRI 117 10/16/2019    BCR 13 10/16/2019    K 4.6 10/16/2019    CO2 27 10/16/2019    CALCIUM 9.3 10/16/2019    PROTENTOTREF 6.7 10/16/2019    ALBUMIN 4.3 10/16/2019    LABIL2 1.8 10/16/2019    AST 50 (H) 10/16/2019    ALT 76 (H) 10/16/2019     Lab Results   Component Value Date    GENO Negative 10/16/2019      No results found for: CRP   No results found for: IRON, TIBC, FERRITIN   Lab Results   Component Value Date    KAYORROE54 504 12/11/2017        Age-appropriate Screening Schedule:  Refer to the list below for future screening recommendations based on patient's age, sex and/or medical conditions. Orders for these recommended tests are listed in the plan section. The patient has been provided with a written plan.    Health Maintenance   Topic Date Due   • COLONOSCOPY  1975   • TDAP/TD VACCINES (1 - Tdap) 09/28/1993   • LIPID PANEL  10/16/2020   • INFLUENZA VACCINE  10/20/2021 (Originally 8/1/2020)           Assessment/Plan      Medications        Problem List          LOS    Physical.  Doing well.  Recommend update tetanus shot at [facility].  Flu vaccine declined.  Discussed health maintenance, screening tests, lifestyle modification.  Nephrolithiasis.  Improved/resolved currently. 4 mm left distal ureter per CT 4/20, multiple other stones bilateral kidneys 4 to 5 mm.  Increase fluid intake.    Knee pain.  Left, patellar tendinitis.  OA contributing.  IM steroids given.  Ice, NSAIDs, rehabilitation exercises discussed.  Call return if persistent symptoms.  Gallstone.  Small stone incidental finding per CT 4/20.  Asymptomatic.  Consider surgery referral if symptoms develop.  Hyperlipidemia.  Familial with significant elevated triglycerides.  Improved on statin.  Follow-up recheck.  Fatty liver.  With mild elevation LFTs.  Continue fish oil.  Continue to monitor.  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  Recommend hematology follow-up, offered second opinion from alternative hematologist he declines currently.  Clinically stable on Claritin/ranitidine.  Lumbar disc disease.  Undergoing PT currently, considering epidural injections.  Followed by [employer].  Asthma.  Improved on pulmonary toilet.  Infrequent baseline inhaler use currently.  Hypertension.  Good control on carvedilol.  And benign 2012.  Discussed low-sodium diet.  Follow-up recheck.  Anxiety.  Recommend Rx he declines.       Problem List Items Addressed This Visit        Unprioritized    Essential hypertension (Chronic)    Relevant Orders    CBC & Differential    Comprehensive Metabolic Panel    Dyslipidemia    Overview     History of severe elevation in triglycerides  Improved on statin  Recheck fasting labs  Discussed diet exercise lifestyle modification  Follow-up recheck         Relevant Orders    Lipid Panel With / Chol / HDL Ratio    Annual visit for general adult medical examination with abnormal findings - Primary    Overview     Doing well, vaccines current  Anticipatory  guidance discussed         Relevant Orders    POC Urinalysis Dipstick, Automated (Completed)    History of tobacco use    Class 3 severe obesity due to excess calories with serious comorbidity and body mass index (BMI) of 40.0 to 44.9 in adult (CMS/[facility])      Other Visit Diagnoses     Malaise and fatigue        Relevant Orders    TSH    Dysuria        Relevant Orders    Urine Culture - Urine, Urine, Clean Catch              Expected course, medications, and adverse effects discussed.  Call or return if worsening or persistent symptoms.  I wore protective equipment throughout this patient encounter including a mask, gloves, and eye protection.  Hand hygiene was performed before donning protective equipment and after removal when leaving the room.

## 2020-10-21 ENCOUNTER — TREATMENT (OUTPATIENT)
Dept: PHYSICAL THERAPY | Facility: CLINIC | Age: 45
End: 2020-10-21

## 2020-10-21 DIAGNOSIS — M54.50 ACUTE RIGHT-SIDED LOW BACK PAIN WITHOUT SCIATICA: Primary | ICD-10-CM

## 2020-10-21 LAB
ALBUMIN SERPL-MCNC: 4.1 G/DL (ref 4–5)
ALBUMIN/GLOB SERPL: 1.5 {RATIO} (ref 1.2–2.2)
ALP SERPL-CCNC: 116 IU/L (ref 39–117)
ALT SERPL-CCNC: 72 IU/L (ref 0–44)
AST SERPL-CCNC: 50 IU/L (ref 0–40)
BASOPHILS # BLD AUTO: 0.1 X10E3/UL (ref 0–0.2)
BASOPHILS NFR BLD AUTO: 1 %
BILIRUB SERPL-MCNC: 0.5 MG/DL (ref 0–1.2)
BUN SERPL-MCNC: 10 MG/DL (ref 6–24)
BUN/CREAT SERPL: 12 (ref 9–20)
CALCIUM SERPL-MCNC: 9.1 MG/DL (ref 8.7–10.2)
CHLORIDE SERPL-SCNC: 96 MMOL/L (ref 96–106)
CHOLEST SERPL-MCNC: 246 MG/DL (ref 100–199)
CHOLEST/HDLC SERPL: 7.2 RATIO (ref 0–5)
CO2 SERPL-SCNC: 28 MMOL/L (ref 20–29)
CREAT SERPL-MCNC: 0.82 MG/DL (ref 0.76–1.27)
EOSINOPHIL # BLD AUTO: 0.6 X10E3/UL (ref 0–0.4)
EOSINOPHIL NFR BLD AUTO: 9 %
ERYTHROCYTE [DISTWIDTH] IN BLOOD BY AUTOMATED COUNT: 12.5 % (ref 11.6–15.4)
GLOBULIN SER CALC-MCNC: 2.8 G/DL (ref 1.5–4.5)
GLUCOSE SERPL-MCNC: 295 MG/DL (ref 65–99)
HCT VFR BLD AUTO: 49.7 % (ref 37.5–51)
HDLC SERPL-MCNC: 34 MG/DL
HGB BLD-MCNC: 16.9 G/DL (ref 13–17.7)
IMM GRANULOCYTES # BLD AUTO: 0 X10E3/UL (ref 0–0.1)
IMM GRANULOCYTES NFR BLD AUTO: 0 %
LDLC SERPL CALC-MCNC: 104 MG/DL (ref 0–99)
LYMPHOCYTES # BLD AUTO: 2.5 X10E3/UL (ref 0.7–3.1)
LYMPHOCYTES NFR BLD AUTO: 38 %
MCH RBC QN AUTO: 30.1 PG (ref 26.6–33)
MCHC RBC AUTO-ENTMCNC: 34 G/DL (ref 31.5–35.7)
MCV RBC AUTO: 88 FL (ref 79–97)
MONOCYTES # BLD AUTO: 0.5 X10E3/UL (ref 0.1–0.9)
MONOCYTES NFR BLD AUTO: 8 %
NEUTROPHILS # BLD AUTO: 3 X10E3/UL (ref 1.4–7)
NEUTROPHILS NFR BLD AUTO: 44 %
PLATELET # BLD AUTO: 199 X10E3/UL (ref 150–450)
POTASSIUM SERPL-SCNC: 4 MMOL/L (ref 3.5–5.2)
PROT SERPL-MCNC: 6.9 G/DL (ref 6–8.5)
RBC # BLD AUTO: 5.62 X10E6/UL (ref 4.14–5.8)
SODIUM SERPL-SCNC: 136 MMOL/L (ref 134–144)
TRIGL SERPL-MCNC: 626 MG/DL (ref 0–149)
TSH SERPL DL<=0.005 MIU/L-ACNC: 1.88 UIU/ML (ref 0.45–4.5)
VLDLC SERPL CALC-MCNC: 108 MG/DL (ref 5–40)
WBC # BLD AUTO: 6.8 X10E3/UL (ref 3.4–10.8)

## 2020-10-21 PROCEDURE — 97140 MANUAL THERAPY 1/> REGIONS: CPT | Performed by: PHYSICAL THERAPIST

## 2020-10-21 PROCEDURE — 97110 THERAPEUTIC EXERCISES: CPT | Performed by: PHYSICAL THERAPIST

## 2020-10-21 PROCEDURE — 97014 ELECTRIC STIMULATION THERAPY: CPT | Performed by: PHYSICAL THERAPIST

## 2020-10-21 NOTE — PROGRESS NOTES
Physical Therapy Daily Progress Note      Patient: Asad Odom   : 1975  Diagnosis/ICD-10 Code:  Acute right-sided low back pain without sciatica [M54.5]  Referring practitioner: Oumou Kay MD  Date of Initial Visit: Type: THERAPY  Noted: 2020  Today's Date: 10/21/2020  Patient seen for 8 sessions         Asad Odom reports: thoracic region doesn't hurt as bad today but his lumbar region is very sore particularly worse on the R side. Pt. States despite that area being sore he feels much improved overall compared to the normal pain he has.     Objective   See Exercise, Manual, and Modality Logs for complete treatment.     Assessment/Plan   Pt. Still presents with poor tolerance to exercise and stretch as well as having pain with bed mobility and transitions from supine to sit or supine to side lying and vice versa respectively. Pt. Ambulates with antalgic gait throughout treatment this date arching through back with a L lateral lean as well.    Progress per Plan of Care           Timed:         Manual Therapy:    15     mins  13584;     Therapeutic Exercise:    15     mins  31872;     Neuromuscular Anya:        mins  27920;    Therapeutic Activity:          mins  38387;     Gait Training:           mins  34781;     Ultrasound:          mins  83923;    Ionto                                   mins   92777  Self Care                            mins   73458  Canalith Repos                   mins  4209    Un-Timed:  Electrical Stimulation:    15     mins  85442 ( );  Dry Needling          mins self-pay  Traction          mins 69617  Low Eval          Mins  80121  Mod Eval          Mins  35086  High Eval                            Mins  99618    Timed Treatment:   30   mins   Total Treatment:     45   mins    Shikha Guerrero PTA  Physical Therapist Assistant License #[id number]

## 2020-10-22 LAB
BACTERIA UR CULT: NO GROWTH
BACTERIA UR CULT: NORMAL

## 2020-10-26 ENCOUNTER — TREATMENT (OUTPATIENT)
Dept: PHYSICAL THERAPY | Facility: CLINIC | Age: 45
End: 2020-10-26

## 2020-10-26 DIAGNOSIS — M54.50 ACUTE RIGHT-SIDED LOW BACK PAIN WITHOUT SCIATICA: Primary | ICD-10-CM

## 2020-10-26 PROCEDURE — 97140 MANUAL THERAPY 1/> REGIONS: CPT | Performed by: PHYSICAL THERAPIST

## 2020-10-26 PROCEDURE — 97014 ELECTRIC STIMULATION THERAPY: CPT | Performed by: PHYSICAL THERAPIST

## 2020-10-26 PROCEDURE — 97110 THERAPEUTIC EXERCISES: CPT | Performed by: PHYSICAL THERAPIST

## 2020-10-26 NOTE — PROGRESS NOTES
Physical Therapy Daily Progress Note      Patient: Asad Odom   : 1975  Diagnosis/ICD-10 Code:  Acute right-sided low back pain without sciatica [M54.5]  Referring practitioner: Oumou Kay MD  Date of Initial Visit: Type: THERAPY  Noted: 2020  Today's Date: 10/26/2020  Patient seen for 9 sessions         Asad Odom reports: he is feeling about the same and states he has noticed his limit on his feet is less than an hour at this point stating over the [place] [employer] [place] and his low back started feeling weird and he needed to get off of his feet.    Objective   See Exercise, Manual, and Modality Logs for complete treatment.     Assessment/Plan   Pt. Tolerates supine and side lying positions well this visit as long as motion was slow and in limited range. Pt. Shows good response to LAD this date and had relief in low back. Pt. Still has difficulty and increased pain and spasm with side lying to sit and despite instruction on appropriate transfer Pt. Still moves sporadically and created a flare up when sitting up. Pt. Was seated for heat and estim this date due to poor bed mobility.    Progress per Plan of Care           Timed:         Manual Therapy:    20     mins  92094;     Therapeutic Exercise:    15     mins  55551;     Neuromuscular Anya:        mins  26565;    Therapeutic Activity:          mins  34512;     Gait Training:           mins  33589;     Ultrasound:          mins  22979;    Ionto                                   mins   56863  Self Care                            mins   88086  Canalith Repos                   mins  4209    Un-Timed:  Electrical Stimulation:    15     mins  31922 ( );  Dry Needling          mins self-pay  Traction          mins 55125  Low Eval          Mins  61042  Mod Eval          Mins  47744  High Eval                            Mins  40145    Timed Treatment:   35   mins   Total Treatment:     50   mins    Shikha  JERZY Guerrero  Physical Therapist Assistant License #[id number]

## 2020-10-28 ENCOUNTER — TREATMENT (OUTPATIENT)
Dept: PHYSICAL THERAPY | Facility: CLINIC | Age: 45
End: 2020-10-28

## 2020-10-28 DIAGNOSIS — M54.50 ACUTE RIGHT-SIDED LOW BACK PAIN WITHOUT SCIATICA: Primary | ICD-10-CM

## 2020-10-28 PROCEDURE — 97140 MANUAL THERAPY 1/> REGIONS: CPT | Performed by: PHYSICAL THERAPIST

## 2020-10-28 PROCEDURE — 97110 THERAPEUTIC EXERCISES: CPT | Performed by: PHYSICAL THERAPIST

## 2020-10-28 PROCEDURE — 97014 ELECTRIC STIMULATION THERAPY: CPT | Performed by: PHYSICAL THERAPIST

## 2020-10-28 NOTE — PROGRESS NOTES
Physical Therapy Daily Progress Note      Patient: Asad Odom   : 1975  Diagnosis/ICD-10 Code:  Acute right-sided low back pain without sciatica [M54.5]  Referring practitioner: Oumou Kay MD  Date of Initial Visit: Type: THERAPY  Noted: 2020  Today's Date: 10/28/2020  Patient seen for 10 sessions         Asad Odom reports: no new changes having R sided SI pain more prevalent this date. Pt. States mid and low back are still sore and feels like he is getting punched if he moves wrong.    Objective   See Exercise, Manual, and Modality Logs for complete treatment.     Assessment/Plan   Pt. Tolerates treatment moderately well this date with no mishaps occurring with bed mobility this date Pt. Was able to successfully sit from side lying with neutral spine and avoid pain. Pt. To benefit form low load cardio exercise to improve endurance and mobility initiate nustep at next visit if tolerable.    Progress per Plan of Care           Timed:         Manual Therapy:    20     mins  84124;     Therapeutic Exercise:    15     mins  31297;     Neuromuscular Anya:        mins  50898;    Therapeutic Activity:          mins  49075;     Gait Training:           mins  58732;     Ultrasound:          mins  84912;    Ionto                                   mins   76663  Self Care                            mins   55715  Canalith Repos                   mins  4209    Un-Timed:  Electrical Stimulation:   15      mins  75661 ( );  Dry Needling          mins self-pay  Traction          mins 45934  Low Eval          Mins  06455  Mod Eval          Mins  58997  High Eval                            Mins  79368    Timed Treatment:   35   mins   Total Treatment:     50   mins    Shikha Guerrero PTA  Physical Therapist Assistant License #[id number]

## 2020-11-02 ENCOUNTER — TELEPHONE (OUTPATIENT)
Dept: FAMILY MEDICINE CLINIC | Facility: CLINIC | Age: 45
End: 2020-11-02

## 2020-11-02 NOTE — TELEPHONE ENCOUNTER
----- Message from Pablo Noyola MD sent at 10/30/2020  8:37 AM EDT -----  Let him know his blood work shows his triglycerides are little worse this year at 640 but his dangerous/LDL cholesterol is staying down very well at 104, want him to continue the simvastatin, his liver function is mildly elevated from his fatty liver but stable from the last check, I am concerned his blood sugar is very high, it was 267 this year which is in the diabetes range, lets get him scheduled back in in the next week or 2, want him to come fasting, will recheck his blood sugar and check a 3-month average hemoglobin A1c at that time, keep working on diet and exercise, thanks

## 2020-11-04 ENCOUNTER — TREATMENT (OUTPATIENT)
Dept: PHYSICAL THERAPY | Facility: CLINIC | Age: 45
End: 2020-11-04

## 2020-11-04 DIAGNOSIS — M54.50 ACUTE RIGHT-SIDED LOW BACK PAIN WITHOUT SCIATICA: Primary | ICD-10-CM

## 2020-11-04 PROCEDURE — 97110 THERAPEUTIC EXERCISES: CPT | Performed by: PHYSICAL THERAPIST

## 2020-11-04 PROCEDURE — 97014 ELECTRIC STIMULATION THERAPY: CPT | Performed by: PHYSICAL THERAPIST

## 2020-11-04 PROCEDURE — 97140 MANUAL THERAPY 1/> REGIONS: CPT | Performed by: PHYSICAL THERAPIST

## 2020-11-04 NOTE — PROGRESS NOTES
Physical Therapy Daily Progress Note      Patient: Asad Odom   : 1975  Diagnosis/ICD-10 Code:  Acute right-sided low back pain without sciatica [M54.5]  Referring practitioner: Oumou Kay MD  Date of Initial Visit: Type: THERAPY  Noted: 2020  Today's Date: 2020  Patient seen for 11 sessions         Asad Odom reports: he believes he is improving gradually but upright tolerance and bed mobility continue to be pain increasing factors. Pt. Reports after ~10 minutes on his feet made him have to compensate through his back ambulating with antalgic gait. Pt. reports he is doing better with his HEP and has tried to be more cautious of twisting movements to avoid pain. Pt. Reports he would like to try exercising at the [facility] to improve strength and mobility.  Objective   See Exercise, Manual, and Modality Logs for complete treatment.   Nustep was added this date at lvl 5 arms and legs for conditioning.    Assessment/Plan   Pt. Was educated on benefits of movement rather than being stagnant at home and potential benefits of aquatic exercise should he pursue the [facility]. Pt. Was given [facility] pass for trial and was given explicit instruction to not overdue it but rather do exercises we have discussed and to be cautious of pain and to workout within limits. Pt. Performs well this date with exercise and only has one moment of sudden pain when standing from seated heat and Estim.    Progress per Plan of Care           Timed:         Manual Therapy:    10     mins  20299;     Therapeutic Exercise:    25     mins  03712;     Neuromuscular Anya:        mins  28230;    Therapeutic Activity:          mins  91153;     Gait Training:           mins  10232;     Ultrasound:          mins  74317;    Ionto                                   mins   79413  Self Care                            mins   58870  Canalith Repos                   mins  4209    Un-Timed:  Electrical Stimulation:    15     mins  91914 (  );  Dry Needling         mins self-pay  Traction          mins 48640  Low Eval          Mins  78499  Mod Eval          Mins  59065  High Eval                            Mins  89518    Timed Treatment:  35    mins   Total Treatment:     50   mins    Shikha Guerrero PTA  Physical Therapist Assistant License #[id number]

## 2020-11-04 NOTE — PROGRESS NOTES
Subjective   Asad Odom is a 45 y.o. male.     Chief Complaint   Patient presents with   • Hyperglycemia       Asad glucose was 213 at his visit today    Hypertension  This is a chronic problem. The current episode started more than 1 year ago. The problem is unchanged. The problem is uncontrolled. Associated symptoms include headaches. Pertinent negatives include no chest pain, neck pain, palpitations or shortness of breath. Risk factors for coronary artery disease include dyslipidemia, male gender, obesity and smoking/tobacco exposure. Current antihypertension treatment includes beta blockers. The current treatment provides moderate improvement. There are no compliance problems.    Hyperlipidemia  This is a chronic problem. The current episode started more than 1 year ago. Recent lipid tests were reviewed and are high. Exacerbating diseases include diabetes and obesity. Pertinent negatives include no chest pain, myalgias or shortness of breath. Current antihyperlipidemic treatment includes statins. There are no compliance problems.  Risk factors for coronary artery disease include dyslipidemia, hypertension and male sex.   Blood Sugar Problem  This is a new problem. The current episode started more than 1 month ago. Associated symptoms include fatigue and headaches. Pertinent negatives include no abdominal pain, anorexia, chest pain, chills, congestion, diaphoresis, myalgias, nausea, neck pain, vertigo or vomiting. Associated symptoms comments: Back pain  .            I personally reviewed and updated the patient's allergies, medications, problem list, and past medical, surgical, social, and family history. I have reviewed and confirmed the accuracy of the History of Present Illness and Review of Symptoms as documented by the MA/NOEMY/RN. Pablo Noyola MD    Family History   Problem Relation Age of Onset   • Other Mother         suicide   • Other Sister         Suicide sibilingd       Social History      Tobacco Use   • Smoking status: Former Smoker     Types: Cigarettes, Cigars     Quit date:      Years since quittin.8   • Smokeless tobacco: Former User     Types: Chew     Quit date: 2019   Substance Use Topics   • Alcohol use: Yes     Frequency: Monthly or less   • Drug use: No       History reviewed. No pertinent surgical history.    Patient Active Problem List   Diagnosis   • Asthma   • Dyslipidemia   • Elevated serum tryptase   • Essential hypertension   • Mast cell disorder   • Nephrolithiasis   • Anxiety   • GERD (gastroesophageal reflux disease)   • Osteoarthritis   • Carotid artery stenosis   • AK (actinic keratosis)   • Annual visit for general adult medical examination with abnormal findings   • History of tobacco use   • Class 3 severe obesity due to excess calories with serious comorbidity and body mass index (BMI) of 40.0 to 44.9 in adult (CMS/[facility])   • Arthritis   • Elevated liver function tests   • Acute right-sided low back pain without sciatica   • Encounter for CDL (commercial driving license) exam   • Hemorrhoids   • Hypercholesterolemia   • Elevated fasting blood sugar   • Type 2 diabetes mellitus with hyperglycemia, without long-term current use of insulin (CMS/[facility])         Current Outpatient Medications:   •  albuterol sulfate  (90 Base) MCG/ACT inhaler, Inhale 1 puff., Disp: , Rfl:   •  aspirin 81 MG tablet, Take 81 mg by mouth., Disp: , Rfl:   •  carvedilol (COREG) 25 MG tablet, Take 1 tablet by mouth 2 (Two) Times a Day With Meals., Disp: 60 tablet, Rfl: 12  •  cetirizine (zyrTEC) 5 MG tablet, Take 5 mg by mouth., Disp: , Rfl:   •  cyclobenzaprine (FLEXERIL) 10 MG tablet, TK 1 T PO NIGHTLY PRF MSP, Disp: , Rfl:   •  meloxicam (MOBIC) 15 MG tablet, Take 1 tablet daily as needed, Disp: 30 tablet, Rfl: 11  •  raNITIdine (ZANTAC) 150 MG tablet, TAKE 1 TABLET BY MOUTH TWICE DAILY, Disp: , Rfl:   •  simvastatin (ZOCOR) 40 MG tablet, Take 1 tablet by mouth Every Night.,  "Disp: 30 tablet, Rfl: 12  •  tamsulosin (FLOMAX) 0.4 MG capsule 24 hr capsule, TAKE 1 CAPSULE BY MOUTH DAILY, Disp: 30 capsule, Rfl: 0  •  tiZANidine (ZANAFLEX) 4 MG tablet, Take 1 tablet by mouth At Night As Needed for Muscle Spasms., Disp: 14 tablet, Rfl: 0  •  traMADol (ULTRAM) 50 MG tablet, TK 1 T PO Q 4 TO 6 H PRN P, Disp: , Rfl:   •  amoxicillin-clavulanate (AUGMENTIN) 875-125 MG per tablet, TK 1 T PO Q 12 H FOR 10 DAYS, Disp: , Rfl:   •  metFORMIN (GLUCOPHAGE) 500 MG tablet, Take 1 tablet by mouth 2 (Two) Times a Day With Meals., Disp: 60 tablet, Rfl: 5  •  predniSONE (DELTASONE) 5 MG tablet, Take 1 tablet by mouth Daily. 40mg x 3 days, 20mg x 3 days, 10mg x 3 days, 5mg x 3 days, Disp: 45 tablet, Rfl: 0         Review of Systems   Constitutional: Positive for fatigue. Negative for chills and diaphoresis.   HENT: Negative for congestion, trouble swallowing and voice change.    Eyes: Negative for visual disturbance.   Respiratory: Negative for shortness of breath.    Cardiovascular: Negative for chest pain and palpitations.   Gastrointestinal: Negative for abdominal pain, anorexia, nausea and vomiting.   Endocrine: Negative for polydipsia and polyphagia.   Genitourinary: Negative for hematuria.   Musculoskeletal: Negative for myalgias, neck pain and neck stiffness.   Skin: Negative for color change and pallor.   Allergic/Immunologic: Negative for immunocompromised state.   Neurological: Negative for vertigo, seizures and syncope.   Hematological: Negative for adenopathy.   Psychiatric/Behavioral: Negative for hallucinations, sleep disturbance and suicidal ideas.       I have reviewed and confirmed the accuracy of the ROS as documented by the MA/LPN/RN Pablo Noyola MD      Objective   /80 (BP Location: Right arm, Patient Position: Sitting, Cuff Size: Adult)   Pulse 74   Temp 97.3 °F (36.3 °C)   Resp 20   Ht 185.4 cm (73\")   Wt (!) 139 kg (306 lb 6.4 oz)   SpO2 98%   BMI 40.42 kg/m²   BP Readings " from Last 3 Encounters:   11/06/20 151/80   10/20/20 138/90   09/18/20 138/84     Wt Readings from Last 3 Encounters:   11/06/20 (!) 139 kg (306 lb 6.4 oz)   10/20/20 (!) 141 kg (311 lb)   09/18/20 (!) 144 kg (316 lb 12.8 oz)     Physical Exam  Constitutional:       Appearance: Normal appearance. He is well-developed. He is not diaphoretic.   Eyes:      General: Lids are normal.      Extraocular Movements:      Right eye: No nystagmus.      Left eye: No nystagmus.      Conjunctiva/sclera: Conjunctivae normal.      Right eye: Right conjunctiva is not injected. No hemorrhage.     Left eye: Left conjunctiva is not injected. No hemorrhage.     Pupils: Pupils are equal, round, and reactive to light.      Funduscopic exam:     Right eye: No hemorrhage, exudate, AV nicking, arteriolar narrowing or papilledema.         Left eye: No hemorrhage, exudate, AV nicking, arteriolar narrowing or papilledema.   Cardiovascular:      Rate and Rhythm: Normal rate and regular rhythm.      Pulses: Normal pulses.      Heart sounds: Normal heart sounds, S1 normal and S2 normal. No murmur. No friction rub. No gallop.    Pulmonary:      Effort: Pulmonary effort is normal. No accessory muscle usage.      Breath sounds: Normal breath sounds. No stridor. No decreased breath sounds, wheezing, rhonchi or rales.   Abdominal:      General: Bowel sounds are normal. There is no distension.      Palpations: Abdomen is soft. Abdomen is not rigid. There is no mass or pulsatile mass.      Tenderness: There is no abdominal tenderness. There is no guarding or rebound. Negative signs include Randolph's sign.      Hernia: No hernia is present.   Skin:     General: Skin is warm and dry.      Coloration: Skin is not pale.   Neurological:      Mental Status: He is alert and oriented to person, place, and time.      Coordination: Coordination normal.      Gait: Gait normal.         Recent Lab Results:    Hemoglobin A1C   Date Value Ref Range Status   11/06/2020  11.0 % Final     Lab Results   Component Value Date     (H) 10/20/2020     Lab Results   Component Value Date     (H) 10/20/2020    LDL Comment 10/16/2019     (H) 12/11/2017     Lab Results   Component Value Date    CHOL 223 (H) 09/25/2018    CHOL 211 (H) 12/11/2017    CHOL 197 09/24/2016     Lab Results   Component Value Date    TRIG 626 (H) 10/20/2020    TRIG 541 (H) 10/16/2019    TRIG 428 (H) 09/25/2018     Lab Results   Component Value Date    HDL 34 (L) 10/20/2020    HDL 31 (L) 10/16/2019    HDL 39 (L) 09/25/2018     No results found for: PSA  Lab Results   Component Value Date    WBC 6.8 10/20/2020    HGB 16.9 10/20/2020    HCT 49.7 10/20/2020    MCV 88 10/20/2020     10/20/2020     Lab Results   Component Value Date    TSH 1.880 10/20/2020      Lab Results   Component Value Date    BUN 10 10/20/2020    CREATININE 0.82 10/20/2020    EGFRIFNONA 106 10/20/2020    EGFRIFAFRI 123 10/20/2020    BCR 12 10/20/2020    K 4.0 10/20/2020    CO2 28 10/20/2020    CALCIUM 9.1 10/20/2020    PROTENTOTREF 6.9 10/20/2020    ALBUMIN 4.1 10/20/2020    LABIL2 1.5 10/20/2020    AST 50 (H) 10/20/2020    ALT 72 (H) 10/20/2020     Lab Results   Component Value Date    GENO Negative 10/16/2019      No results found for: CRP   No results found for: IRON, TIBC, FERRITIN   Lab Results   Component Value Date    FWFSZCBW15 504 12/11/2017          Assessment/Plan      Medications        Problem List         LOS    Type 2 diabetes.  New onset.  Discussed diet, exercise, lifestyle modification.  Start Metformin. Monitor blood sugars.  Consider nutritionist referral.  Follow-up 1 month. Plan add lisinopril, ophthalmology referral.  Health maintenance.  Doing well.  Recommend update tetanus shot at [employer].  Flu vaccine declined.  Discussed health maintenance, screening tests, lifestyle modification.  Nephrolithiasis.  Improved/resolved currently. 4 mm left distal ureter per CT 4/20, multiple other stones bilateral  kidneys 4 to 5 mm.  Increase fluid intake.    Knee pain.  Left, patellar tendinitis.  OA contributing.  IM steroids given.  Ice, NSAIDs, rehabilitation exercises discussed.  Call return if persistent symptoms.  Gallstone.  Small stone incidental finding per CT 4/20.  Asymptomatic.  Consider surgery referral if symptoms develop.  Hyperlipidemia.  Familial with significant elevated triglycerides.  Improved on statin.  Follow-up recheck.  Fatty liver.  With mild elevation LFTs.  Continue fish oil.  Continue to monitor.  Mast cell disorder.  Has been followed by Dr. Medina in the past.  Recommend hematology follow-up, offered second opinion from alternative hematologist he declines currently.  Clinically stable on Claritin/ranitidine.  Lumbar disc disease.  Undergoing PT currently, considering epidural injections.  Followed by [employer].  Asthma.  Improved on pulmonary toilet.  Infrequent baseline inhaler use currently.  Hypertension.  Good control on carvedilol.  And benign 2012.  Discussed low-sodium diet.  Follow-up recheck.  Anxiety.  Recommend Rx he declines.      Problem List Items Addressed This Visit        Unprioritized    Dyslipidemia - Primary (Chronic)    Overview     History of severe elevation in triglycerides  Improved on statin  Recheck fasting labs  Discussed diet exercise lifestyle modification  Follow-up recheck         Essential hypertension (Chronic)    History of tobacco use    Class 3 severe obesity due to excess calories with serious comorbidity and body mass index (BMI) of 40.0 to 44.9 in adult (CMS/[facility])    Elevated fasting blood sugar    Relevant Orders    POCT Glucose (Completed)    POC Glycosylated Hemoglobin (Hb A1C) (Completed)    POC Urinalysis Dipstick, Automated (Completed)    Type 2 diabetes mellitus with hyperglycemia, without long-term current use of insulin (CMS/[facility])    Relevant Medications    metFORMIN (GLUCOPHAGE) 500 MG tablet              Expected course, medications, and  adverse effects discussed.  Call or return if worsening or persistent symptoms.  I wore protective equipment throughout this patient encounter including a mask, gloves, and eye protection.  Hand hygiene was performed before donning protective equipment and after removal when leaving the room.

## 2020-11-06 ENCOUNTER — OFFICE VISIT (OUTPATIENT)
Dept: FAMILY MEDICINE CLINIC | Facility: CLINIC | Age: 45
End: 2020-11-06

## 2020-11-06 ENCOUNTER — TREATMENT (OUTPATIENT)
Dept: PHYSICAL THERAPY | Facility: CLINIC | Age: 45
End: 2020-11-06

## 2020-11-06 VITALS
SYSTOLIC BLOOD PRESSURE: 151 MMHG | TEMPERATURE: 97.3 F | OXYGEN SATURATION: 98 % | DIASTOLIC BLOOD PRESSURE: 80 MMHG | HEIGHT: 73 IN | WEIGHT: 306.4 LBS | HEART RATE: 74 BPM | BODY MASS INDEX: 40.61 KG/M2 | RESPIRATION RATE: 20 BRPM

## 2020-11-06 DIAGNOSIS — E11.65 TYPE 2 DIABETES MELLITUS WITH HYPERGLYCEMIA, WITHOUT LONG-TERM CURRENT USE OF INSULIN (HCC): ICD-10-CM

## 2020-11-06 DIAGNOSIS — E66.01 CLASS 3 SEVERE OBESITY DUE TO EXCESS CALORIES WITH SERIOUS COMORBIDITY AND BODY MASS INDEX (BMI) OF 40.0 TO 44.9 IN ADULT (HCC): ICD-10-CM

## 2020-11-06 DIAGNOSIS — R73.01 ELEVATED FASTING BLOOD SUGAR: ICD-10-CM

## 2020-11-06 DIAGNOSIS — E78.5 DYSLIPIDEMIA: Primary | Chronic | ICD-10-CM

## 2020-11-06 DIAGNOSIS — Z87.891 HISTORY OF TOBACCO USE: ICD-10-CM

## 2020-11-06 DIAGNOSIS — M54.50 ACUTE RIGHT-SIDED LOW BACK PAIN WITHOUT SCIATICA: Primary | ICD-10-CM

## 2020-11-06 DIAGNOSIS — I10 ESSENTIAL HYPERTENSION: Chronic | ICD-10-CM

## 2020-11-06 PROBLEM — E11.43 TYPE 2 DIABETES MELLITUS WITH DIABETIC AUTONOMIC NEUROPATHY, WITHOUT LONG-TERM CURRENT USE OF INSULIN (HCC): Status: ACTIVE | Noted: 2020-11-06

## 2020-11-06 PROBLEM — E11.9 TYPE 2 DIABETES MELLITUS (HCC): Status: ACTIVE | Noted: 2020-11-06

## 2020-11-06 LAB
BILIRUB BLD-MCNC: NEGATIVE MG/DL
CLARITY, POC: CLEAR
COLOR UR: YELLOW
GLUCOSE BLDC GLUCOMTR-MCNC: 213 MG/DL (ref 70–130)
GLUCOSE UR STRIP-MCNC: NEGATIVE MG/DL
HBA1C MFR BLD: 11 %
KETONES UR QL: NEGATIVE
LEUKOCYTE EST, POC: NEGATIVE
NITRITE UR-MCNC: NEGATIVE MG/ML
PH UR: 6 [PH] (ref 5–8)
PROT UR STRIP-MCNC: NEGATIVE MG/DL
RBC # UR STRIP: NEGATIVE /UL
SP GR UR: 1.01 (ref 1–1.03)
UROBILINOGEN UR QL: NORMAL

## 2020-11-06 PROCEDURE — 99213 OFFICE O/P EST LOW 20 MIN: CPT | Performed by: FAMILY MEDICINE

## 2020-11-06 PROCEDURE — 82962 GLUCOSE BLOOD TEST: CPT | Performed by: FAMILY MEDICINE

## 2020-11-06 PROCEDURE — 81003 URINALYSIS AUTO W/O SCOPE: CPT | Performed by: FAMILY MEDICINE

## 2020-11-06 PROCEDURE — 83036 HEMOGLOBIN GLYCOSYLATED A1C: CPT | Performed by: FAMILY MEDICINE

## 2020-11-06 NOTE — PROGRESS NOTES
Physical Therapy Daily Progress Note      Patient: Asad Odom   : 1975  Diagnosis/ICD-10 Code:  Acute right-sided low back pain without sciatica [M54.5]  Referring practitioner: Oumou Kay MD  Date of Initial Visit: Type: THERAPY  Noted: 2020  Today's Date: 2020  Patient seen for 12 sessions         Asad Odom reports to therapy this date and upon entering therapy reports he received some bad news and would prefer to forgo session today.    Shikha Guerrero PTA  Physical Therapist Assistant License #[id number]

## 2020-11-13 ENCOUNTER — TREATMENT (OUTPATIENT)
Dept: PHYSICAL THERAPY | Facility: CLINIC | Age: 45
End: 2020-11-13

## 2020-11-13 DIAGNOSIS — M54.50 ACUTE RIGHT-SIDED LOW BACK PAIN WITHOUT SCIATICA: Primary | ICD-10-CM

## 2020-11-13 PROCEDURE — 97110 THERAPEUTIC EXERCISES: CPT | Performed by: PHYSICAL THERAPIST

## 2020-11-13 PROCEDURE — 97014 ELECTRIC STIMULATION THERAPY: CPT | Performed by: PHYSICAL THERAPIST

## 2020-11-13 NOTE — PROGRESS NOTES
Physical Therapy Daily Progress Note      Patient: Asad Odom   : 1975  Diagnosis/ICD-10 Code:  Acute right-sided low back pain without sciatica [M54.5]  Referring practitioner: Oumou Kay MD  Date of Initial Visit: Type: THERAPY  Noted: 2020  Today's Date: 2020  Patient seen for 12 sessions         Asad Odom reports: he has still been having the feeling in his low and middle back like he is being punched or shocked intermittently and there is always a consistent ache that never goes away. Pt. Reports he has tried a firmer mattress at home and it helped significantly the one night he tried it.     Objective   See Exercise, Manual, and Modality Logs for complete treatment.     Assessment/Plan   Pt. Tolerates treatment well this visit avoiding any supine or side lying positions. Exercise oriented PT was trialed this date to avoid flare up pain with positional change and LE strengthening was prioritized to aid in return to work function and endurance. Pt. responds well to forward flexion stretch for relief this visit.    Progress per Plan of Care           Timed:         Manual Therapy:         mins  22079;     Therapeutic Exercise:    30     mins  52853;     Neuromuscular Anya:        mins  06533;    Therapeutic Activity:          mins  26419;     Gait Training:           mins  49243;     Ultrasound:          mins  43033;    Ionto                                   mins   63074  Self Care                            mins   51759  Canalith Repos                   mins  4209    Un-Timed:  Electrical Stimulation:    15     mins  43116 ( );  Dry Needling          mins self-pay  Traction          mins 02245  Low Eval          Mins  62476  Mod Eval          Mins  11574  High Eval                            Mins  79533    Timed Treatment:   45   mins   Total Treatment:     45   mins    Shikha Guerrero PTA  Physical Therapist Assistant License #[id number]

## 2020-11-24 ENCOUNTER — TREATMENT (OUTPATIENT)
Dept: PHYSICAL THERAPY | Facility: CLINIC | Age: 45
End: 2020-11-24

## 2020-11-24 DIAGNOSIS — M54.50 ACUTE RIGHT-SIDED LOW BACK PAIN WITHOUT SCIATICA: Primary | ICD-10-CM

## 2020-11-24 PROCEDURE — 97110 THERAPEUTIC EXERCISES: CPT | Performed by: PHYSICAL THERAPIST

## 2020-11-24 PROCEDURE — 97014 ELECTRIC STIMULATION THERAPY: CPT | Performed by: PHYSICAL THERAPIST

## 2020-11-24 NOTE — PROGRESS NOTES
Physical Therapy Daily Progress Note      Patient: Asad Odom   : 1975  Diagnosis/ICD-10 Code:  Acute right-sided low back pain without sciatica [M54.5]  Referring practitioner: Oumou Kay MD  Date of Initial Visit: Type: THERAPY  Noted: 2020  Today's Date: 2020  Patient seen for 13 sessions         Asad Odom reports: he has felt some better with newly prescribed anti inflammatory medication. Pt. Reports despite improvement he still has severe spikes of pain intermittently for varied reasons whether when moving in bed or after working out.  Pt. States he has been going to the gym and has tried various exercise and had been doing well all the way through Saturday this past weekend and then has had R sided and central low back pain intermittently since which he describes as feeling like being punched and it is severe momentarily and then is sore after. Pt. Reports recently he has been mor health conscious with diet and has lost significant weight dropping multiple pant sizes in attempts to be healthier and alleviate stresses on his body.    Objective   See Exercise, Manual, and Modality Logs for complete treatment.     Assessment/Plan   Pt. Completes all activities this date due to continued reports of bed mobility intolerance. Pt. Fatigues easily with exercise this date due to being on feet all day for work. Pt. continue to perform duties at work that do not require lifting or excessive bending and twisting to lessen back pain. Pt. Tolerates activity well however does have intermittent R and central back discomfort with movement throughout treatment.    Progress per Plan of Care           Timed:            Therapeutic Exercise:    30     mins  54503;       Un-Timed:  Electrical Stimulation:    15     mins  24756 ( );      Timed Treatment:  30    mins   Total Treatment:     45   mins    Shikha Guerrero PTA  Physical Therapist Assistant License #[id number]

## 2020-12-07 ENCOUNTER — TREATMENT (OUTPATIENT)
Dept: PHYSICAL THERAPY | Facility: CLINIC | Age: 45
End: 2020-12-07

## 2020-12-07 DIAGNOSIS — M54.50 ACUTE RIGHT-SIDED LOW BACK PAIN WITHOUT SCIATICA: Primary | ICD-10-CM

## 2020-12-07 PROCEDURE — 97140 MANUAL THERAPY 1/> REGIONS: CPT | Performed by: PHYSICAL THERAPIST

## 2020-12-07 PROCEDURE — 97014 ELECTRIC STIMULATION THERAPY: CPT | Performed by: PHYSICAL THERAPIST

## 2020-12-07 PROCEDURE — 97110 THERAPEUTIC EXERCISES: CPT | Performed by: PHYSICAL THERAPIST

## 2020-12-07 NOTE — PROGRESS NOTES
Physical Therapy Daily Progress Note        Patient: Asad Odom   : 1975  Diagnosis/ICD-10 Code:  Acute right-sided low back pain without sciatica [M54.5]  Referring practitioner: No ref. provider found  Date of Initial Visit: Type: THERAPY  Noted: 2020  Today's Date: 2020  Patient seen for 14 sessions         Asad Odom reports: am stiffness, takes ibuprophen, had good day Saturday, increases with riding in car. Stays primarily in back, no recent shooting pains.,  Patient states Dr. Jeromy Pardo is his attending MD, did not notify PT of change, therapist investigating with MD office to forward updated eval/assess.  1 more visit authorized by       Subjective     Objective   See Exercise, Manual, and Modality Logs for complete treatment.   Poor transitional movement.  Knee to chest WNL due to patient size, SLR to 85 degrees and negative for dural signs.  Isolated discomfort in right mid/lower thoracic lateral to spine and low lumbar pain with referral into SI region right.  Poor hip abd contraction with resisted testing, instructed all 4's stretch, emphasis to thor rotation stretch and improved mobility    Standing posture remains moderate thoracic rounding with increased lumbar lordosis    Oswestry 40%  Originally 52%    Assessment/Plan     Patient failed to notify PT of change of MD to [facility]  Med, trying to get confirmation with Dr. Pardo office to send updated info.  1 visit left on auth for     1 visit on auth for            Timed:         Manual Therapy:    15     mins  21566;     Therapeutic Exercise:    15     mins  63464;     Neuromuscular Anya:    0    mins  24988;    Therapeutic Activity:     0     mins  05840;     Gait Trainin     mins  76319;     Ultrasound:     0     mins  43195;    Ionto                               0    mins   71314  Self Care                       0     mins   62428  Canalith Repos               0    mins  4209    Un-Timed:  Electrical  Stimulation:    15     mins  38157 ( );  Dry Needling     0     mins self-pay  Traction     0     mins 92936  Low Eval     0     Mins  23522  Mod Eval     0     Mins  47796  High Eval                       0     Mins  20123    Timed Treatment:   30   mins   Total Treatment:     45   mins    Lesley Son PT  Physical Therapist  [place] license [id number]

## 2021-01-03 DIAGNOSIS — I10 ESSENTIAL HYPERTENSION: ICD-10-CM

## 2021-01-04 RX ORDER — CARVEDILOL 25 MG/1
TABLET ORAL
Qty: 60 TABLET | Refills: 12 | Status: SHIPPED | OUTPATIENT
Start: 2021-01-04 | End: 2022-02-16 | Stop reason: SDUPTHER

## 2021-01-05 ENCOUNTER — TELEPHONE (OUTPATIENT)
Dept: FAMILY MEDICINE CLINIC | Facility: CLINIC | Age: 46
End: 2021-01-05

## 2021-01-13 NOTE — TELEPHONE ENCOUNTER
That will be fine to discontinue carvedilol, start losartan 50 mg daily #30 with 5 refills, he should check his blood pressure at home and will recheck it at his next visit, thanks

## 2021-01-14 NOTE — TELEPHONE ENCOUNTER
Called harinder and he said he has new insurance. He will call pharmacy and let them know and get this take care of.

## 2021-01-15 ENCOUNTER — TELEPHONE (OUTPATIENT)
Dept: FAMILY MEDICINE CLINIC | Facility: CLINIC | Age: 46
End: 2021-01-15

## 2021-02-01 ENCOUNTER — DOCUMENTATION (OUTPATIENT)
Dept: PHYSICAL THERAPY | Facility: CLINIC | Age: 46
End: 2021-02-01

## 2021-02-01 DIAGNOSIS — M54.50 ACUTE RIGHT-SIDED LOW BACK PAIN WITHOUT SCIATICA: Primary | ICD-10-CM

## 2021-02-01 DIAGNOSIS — E78.5 DYSLIPIDEMIA: ICD-10-CM

## 2021-02-01 NOTE — PROGRESS NOTES
Discharge Summary  Discharge Summary from Physical Therapy Report      Dates  PT visit: 9/21/2020-12/7/2020  Number of Visits: 14     Discharge Status of Patient: See MD Note dated 12/7/2020    Goals: Partially Met    Discharge Plan: Continue with current home exercise program as instructed    Comments : At last visit, pt notified of MD change. PT awaiting confirmation of this from MD. Pt did not return for additional tx and had 1 visit auth left with WC.    Date of Discharge 2/1/2021        Agnes Levy, PT  Physical Therapist

## 2021-02-02 RX ORDER — SIMVASTATIN 40 MG
40 TABLET ORAL NIGHTLY
Qty: 30 TABLET | Refills: 1 | Status: SHIPPED | OUTPATIENT
Start: 2021-02-02 | End: 2021-04-23

## 2021-03-04 NOTE — TELEPHONE ENCOUNTER
Made pt an office visit appt for 9:45 on Friday 9/18. To Possibly do an MRI because the back pain has not gotten better.    Procedure:  EGD    Relevant Problems   CARDIO   (+) HTN (hypertension)      GI/HEPATIC   (+) GERD (gastroesophageal reflux disease)        Physical Exam    Airway    Mallampati score: I  TM Distance: >3 FB  Neck ROM: full     Dental   upper dentures and lower dentures,     Cardiovascular  Cardiovascular exam normal    Pulmonary  Pulmonary exam normal     Other Findings        Anesthesia Plan  ASA Score- 2     Anesthesia Type- IV sedation with anesthesia with ASA Monitors  Additional Monitors:   Airway Plan:           Plan Factors-    Chart reviewed  Patient summary reviewed  Patient is not a current smoker  Induction- intravenous  Postoperative Plan-     Informed Consent- Anesthetic plan and risks discussed with patient

## 2021-04-23 DIAGNOSIS — E78.5 DYSLIPIDEMIA: ICD-10-CM

## 2021-04-23 RX ORDER — SIMVASTATIN 40 MG
TABLET ORAL
Qty: 30 TABLET | Refills: 0 | Status: SHIPPED | OUTPATIENT
Start: 2021-04-23 | End: 2021-07-16

## 2021-04-26 ENCOUNTER — TELEPHONE (OUTPATIENT)
Dept: FAMILY MEDICINE CLINIC | Facility: CLINIC | Age: 46
End: 2021-04-26

## 2021-04-26 ENCOUNTER — CLINICAL SUPPORT (OUTPATIENT)
Dept: FAMILY MEDICINE CLINIC | Facility: CLINIC | Age: 46
End: 2021-04-26

## 2021-04-26 VITALS
DIASTOLIC BLOOD PRESSURE: 98 MMHG | HEART RATE: 84 BPM | SYSTOLIC BLOOD PRESSURE: 128 MMHG | OXYGEN SATURATION: 98 % | WEIGHT: 294 LBS | TEMPERATURE: 98 F | HEIGHT: 73 IN | BODY MASS INDEX: 38.97 KG/M2 | RESPIRATION RATE: 18 BRPM

## 2021-04-26 DIAGNOSIS — E66.01 CLASS 3 SEVERE OBESITY DUE TO EXCESS CALORIES WITH SERIOUS COMORBIDITY AND BODY MASS INDEX (BMI) OF 40.0 TO 44.9 IN ADULT (HCC): ICD-10-CM

## 2021-04-26 DIAGNOSIS — Z02.4 ENCOUNTER FOR CDL (COMMERCIAL DRIVING LICENSE) EXAM: Primary | ICD-10-CM

## 2021-04-26 DIAGNOSIS — Z87.891 HISTORY OF TOBACCO USE: ICD-10-CM

## 2021-04-26 DIAGNOSIS — E11.65 TYPE 2 DIABETES MELLITUS WITH HYPERGLYCEMIA, WITHOUT LONG-TERM CURRENT USE OF INSULIN (HCC): ICD-10-CM

## 2021-04-26 LAB
BILIRUB BLD-MCNC: NEGATIVE MG/DL
CLARITY, POC: CLEAR
COLOR UR: YELLOW
GLUCOSE UR STRIP-MCNC: NEGATIVE MG/DL
HBA1C MFR BLD: 6 %
KETONES UR QL: NEGATIVE
LEUKOCYTE EST, POC: NEGATIVE
NITRITE UR-MCNC: NEGATIVE MG/ML
PH UR: 5 [PH] (ref 5–8)
PROT UR STRIP-MCNC: NEGATIVE MG/DL
RBC # UR STRIP: NEGATIVE /UL
SP GR UR: 1 (ref 1–1.03)
UROBILINOGEN UR QL: NORMAL

## 2021-04-26 PROCEDURE — 83036 HEMOGLOBIN GLYCOSYLATED A1C: CPT | Performed by: FAMILY MEDICINE

## 2021-04-26 PROCEDURE — 81002 URINALYSIS NONAUTO W/O SCOPE: CPT | Performed by: FAMILY MEDICINE

## 2021-04-26 PROCEDURE — DOTPHY: Performed by: FAMILY MEDICINE

## 2021-04-26 RX ORDER — BLOOD SUGAR DIAGNOSTIC
STRIP MISCELLANEOUS
COMMUNITY
Start: 2021-03-21 | End: 2022-04-20

## 2021-04-26 RX ORDER — METHOCARBAMOL 750 MG/1
750 TABLET, FILM COATED ORAL
COMMUNITY
Start: 2020-12-24 | End: 2021-04-26

## 2021-04-26 NOTE — PROGRESS NOTES
Medical Examination      Subjective     Asad Odom is a 45 y.o. male who presents today for a  fitness determination physical exam. The patient reports no problems.  The following portions of the patient's history were reviewed and updated as appropriate: allergies, current medications, past family history, past medical history, past social history, past surgical history and problem list.    I personally reviewed and updated the patient's allergies, medications, problem list, and past medical, surgical, social, and family history. I have reviewed and confirmed the accuracy of the History of Present Illness and Review of Symptoms as documented by the MA/LPN/RN. Pablo Noyola MD        Family History   Problem Relation Age of Onset   • Other Mother         suicide   • Other Sister         Suicide sibilingd       Social History     Tobacco Use   • Smoking status: Current Some Day Smoker     Types: Cigarettes, Cigars     Last attempt to quit: 2019     Years since quittin.3   • Smokeless tobacco: Former User     Types: Chew     Quit date: 2019   Vaping Use   • Vaping Use: Never used   Substance Use Topics   • Alcohol use: Yes     Comment: occasional   • Drug use: No       History reviewed. No pertinent surgical history.    Patient Active Problem List   Diagnosis   • Asthma   • Dyslipidemia   • Elevated serum tryptase   • Essential hypertension   • Mast cell disorder   • Nephrolithiasis   • Anxiety   • GERD (gastroesophageal reflux disease)   • Osteoarthritis   • Carotid artery stenosis   • AK (actinic keratosis)   • Annual visit for general adult medical examination with abnormal findings   • History of tobacco use   • Class 3 severe obesity due to excess calories with serious comorbidity and body mass index (BMI) of 40.0 to 44.9 in adult (CMS/HCC)   • Arthritis   • Elevated liver function tests   • Acute right-sided low back pain without sciatica   • 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   • Hemorrhoids   • Hypercholesterolemia   • Elevated fasting blood sugar   • Type 2 diabetes mellitus with hyperglycemia, without long-term current use of insulin (CMS/[facility])         Current Outpatient Medications:   •  Accu-Chek Guide test strip, USE 1 STRIP TO TEST BLOOD SUGAR TWICE DAILY, Disp: , Rfl:   •  albuterol sulfate  (90 Base) MCG/ACT inhaler, Inhale 1 puff., Disp: , Rfl:   •  aspirin 81 MG tablet, Take 81 mg by mouth., Disp: , Rfl:   •  carvedilol (COREG) 25 MG tablet, TAKE 1 TABLET BY MOUTH TWICE DAILY WITH MEALS, Disp: 60 tablet, Rfl: 12  •  cetirizine (zyrTEC) 5 MG tablet, Take 5 mg by mouth., Disp: , Rfl:   •  raNITIdine (ZANTAC) 150 MG tablet, TAKE 1 TABLET BY MOUTH TWICE DAILY, Disp: , Rfl:   •  simvastatin (ZOCOR) 40 MG tablet, TAKE 1 TABLET BY MOUTH ONCE DAILY AT NIGHT, Disp: 30 tablet, Rfl: 0         Review of Systems   Constitutional: Negative for chills and diaphoresis.   Eyes: Negative for visual disturbance.   Respiratory: Negative for shortness of breath.    Cardiovascular: Negative for chest pain and palpitations.   Gastrointestinal: Negative for abdominal pain and nausea.   Endocrine: Negative for polydipsia and polyphagia.   Musculoskeletal: Negative for neck stiffness.   Skin: Negative for color change and pallor.   Neurological: Negative for seizures and syncope.   Hematological: Negative for adenopathy.   Psychiatric/Behavioral: Negative for hallucinations and suicidal ideas.       I have reviewed and confirmed the accuracy of the ROS as documented by the MA/LPN/RN Pablo Noyola MD      Objective   /98   Pulse 84   Temp 98 °F (36.7 °C)   Resp 18   Ht 185.4 cm (73\")   Wt 133 kg (294 lb)   SpO2 98%   BMI 38.79 kg/m²   Wt Readings from Last 3 Encounters:   04/26/21 133 kg (294 lb)   11/06/20 (!) 139 kg (306 lb 6.4 oz)   10/20/20 (!) 141 kg (311 lb)       Vision:   Uncorrected Corrected Horizontal Field of Vision   Right Eye 20/20   " >85 degrees   Left Eye  20/25   >85 degrees   Both Eyes  20/20        Applicant can recognize and distinguish among traffic control signals and devices showing standard red, green, and lynette colors.         Monocular Vision?: No    Physical Exam  Constitutional:       Appearance: Normal appearance. He is well-developed. He is not diaphoretic.   Eyes:      General: Lids are normal.      Extraocular Movements:      Right eye: No nystagmus.      Left eye: No nystagmus.      Conjunctiva/sclera: Conjunctivae normal.      Right eye: Right conjunctiva is not injected. No hemorrhage.     Left eye: Left conjunctiva is not injected. No hemorrhage.     Pupils: Pupils are equal, round, and reactive to light.      Funduscopic exam:     Right eye: No hemorrhage, exudate, AV nicking, arteriolar narrowing or papilledema.         Left eye: No hemorrhage, exudate, AV nicking, arteriolar narrowing or papilledema.   Cardiovascular:      Rate and Rhythm: Normal rate and regular rhythm.      Pulses: Normal pulses.      Heart sounds: Normal heart sounds, S1 normal and S2 normal. No murmur heard.   No friction rub. No gallop.    Pulmonary:      Effort: Pulmonary effort is normal. No accessory muscle usage.      Breath sounds: Normal breath sounds. No stridor. No decreased breath sounds, wheezing, rhonchi or rales.   Abdominal:      General: Bowel sounds are normal. There is no distension.      Palpations: Abdomen is soft. Abdomen is not rigid. There is no mass or pulsatile mass.      Tenderness: There is no abdominal tenderness. There is no guarding or rebound. Negative signs include Randolph's sign.      Hernia: No hernia is present.   Skin:     General: Skin is warm and dry.      Coloration: Skin is not pale.   Neurological:      Mental Status: He is alert and oriented to person, place, and time.      Cranial Nerves: No cranial nerve deficit.      Sensory: No sensory deficit.      Motor: No tremor, abnormal muscle tone or seizure activity.       Coordination: Coordination normal.      Gait: Gait normal.      Deep Tendon Reflexes: Reflexes are normal and symmetric.           Assessment/Plan      Medications        Problem List         LOS        Type 2 diabetes.  New onset.  Much improved today, diet controlled, A1c to 6.1.   Discussed diet, exercise, lifestyle modification.. Monitor blood sugars.  Consider nutritionist referral.  Follow-up 1 month.  Health maintenance.  Doing well.  Recommend update tetanus shot at [employer].  Flu vaccine declined.  Discussed health maintenance, screening tests, lifestyle modification.  Nephrolithiasis.  Improved/resolved currently. 4 mm left distal ureter per CT 4/20, multiple other stones bilateral kidneys 4 to 5 mm.  Increase fluid intake.    Knee pain.  Left, patellar tendinitis.  OA contributing.  IM steroids given.  Ice, NSAIDs, rehabilitation exercises discussed.  Call return if persistent symptoms.  Gallstone.  Small stone incidental finding per CT 4/20.  Asymptomatic.  Consider surgery referral if symptoms develop.  Hyperlipidemia.  Familial with significant elevated triglycerides.  Improved on statin.  Follow-up recheck.  Fatty liver.  With mild elevation LFTs.  Continue fish oil.  Continue to monitor.  Mast cell disorder.  Has been followed by Dr. Medina in the past.  Recommend hematology follow-up, offered second opinion from alternative hematologist he declines currently.  Clinically stable on Claritin/ranitidine.  Lumbar disc disease.  Undergoing PT currently, considering epidural injections.  Followed by [employer].  Asthma.  Improved on pulmonary toilet.  Infrequent baseline inhaler use currently.  Hypertension.  Good control on carvedilol.  Cardiolite stress test benign benign 2012.  Carotid Dopplers benign 2017.  Discussed low-sodium diet.  Follow-up recheck.  Anxiety.  Recommend Rx he declines.  CDL physical..  Doing well, cleared to drive.          Diagnoses and all orders for this  visit:    1. 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 Type 2 diabetes mellitus with hyperglycemia, without long-term current use of insulin (CMS/[facility])  -     POC Glycosylated Hemoglobin (Hb A1C)    3. Class 3 severe obesity due to excess calories with serious comorbidity and body mass index (BMI) of 40.0 to 44.9 in adult (CMS/[facility])    4. History of tobacco use    Other orders  -     Cancel: POC Glycosylated Hemoglobin (Hb A1C)              Expected course, medications, and adverse effects discussed.  Call or return if worsening or persistent symptoms.  I wore protective equipment throughout this patient encounter including a mask, gloves, and eye protection.  Hand hygiene was performed before donning protective equipment and after removal when leaving the room.  The complete contents of the assessment and plan and lab results as documented above have been reviewed and addressed by myself with the patient today as part of an ongoing evaluation / treatment plan.  If some of the documentation has been copied from a previous note and is unchanged it indicates that this problem / plan has been assessed today but is stable from a previous visit and no changes have been recommended.

## 2021-04-29 ENCOUNTER — TELEPHONE (OUTPATIENT)
Dept: FAMILY MEDICINE CLINIC | Facility: CLINIC | Age: 46
End: 2021-04-29

## 2021-07-15 DIAGNOSIS — E78.5 DYSLIPIDEMIA: ICD-10-CM

## 2021-07-16 RX ORDER — SIMVASTATIN 40 MG
TABLET ORAL
Qty: 30 TABLET | Refills: 0 | Status: SHIPPED | OUTPATIENT
Start: 2021-07-16 | End: 2021-09-17

## 2021-07-23 DIAGNOSIS — H66.90 EAR INFECTION: Primary | ICD-10-CM

## 2021-07-23 RX ORDER — AZITHROMYCIN 250 MG/1
TABLET, FILM COATED ORAL
Qty: 6 TABLET | Refills: 0 | Status: SHIPPED | OUTPATIENT
Start: 2021-07-23 | End: 2022-02-16

## 2021-07-23 NOTE — TELEPHONE ENCOUNTER
Caller: Asad Odom    Relationship: Self    Best call back number: [phone]*    What medication are you requesting: EAR DROPS    What are your current symptoms: RIGHT EAR PAIN, GUMS HURTING    How long have you been experiencing symptoms: 2 DAYS    Have you had these symptoms before:    [x] Yes  [] No    Have you been treated for these symptoms before:   [x] Yes  [] No    If a prescription is needed, what is your preferred pharmacy and phone number:      [address] [facility] [address] [facility]  - [phone] [facility] [phone]   [phone]    Additional notes:    PATIENT CALLED STATING THAT HE HAS AN INNER EAR INFECTION AND IS REQUESTING EAR DROPS TO BE SENT TO HIS PHARMACY. THE PATIENT STATES THAT HE HAS NO HEALTH INSURANCE AT THIS TIME AND CANNOT COME INTO THE OFFICE.    PATIENT REQUESTING A CALLBACK.

## 2021-09-15 ENCOUNTER — TELEPHONE (OUTPATIENT)
Dept: FAMILY MEDICINE CLINIC | Facility: CLINIC | Age: 46
End: 2021-09-15

## 2021-09-15 NOTE — TELEPHONE ENCOUNTER
Patient went to [facility] on 9-4-21 for sinus infection. He has taken all his antibiotics and the symptoms came back last night. He is requesting another antibiotic to be sent to [facility] in [place] and contact him at .  Thanks Edna

## 2021-09-16 DIAGNOSIS — E78.5 DYSLIPIDEMIA: ICD-10-CM

## 2021-09-17 RX ORDER — SIMVASTATIN 40 MG
TABLET ORAL
Qty: 30 TABLET | Refills: 0 | Status: SHIPPED | OUTPATIENT
Start: 2021-09-17 | End: 2021-11-12 | Stop reason: SDUPTHER

## 2021-09-17 NOTE — TELEPHONE ENCOUNTER
Pt seen dentist and he sent in antibiotic for him. He will call Monday if he doesn't feel any better

## 2021-11-10 DIAGNOSIS — E78.5 DYSLIPIDEMIA: ICD-10-CM

## 2021-11-12 DIAGNOSIS — E78.5 DYSLIPIDEMIA: ICD-10-CM

## 2021-11-12 RX ORDER — SIMVASTATIN 40 MG
40 TABLET ORAL NIGHTLY
Qty: 30 TABLET | Refills: 0 | Status: SHIPPED | OUTPATIENT
Start: 2021-11-12 | End: 2022-02-16 | Stop reason: SDUPTHER

## 2021-11-12 RX ORDER — SIMVASTATIN 40 MG
TABLET ORAL
Qty: 30 TABLET | Refills: 0 | OUTPATIENT
Start: 2021-11-12

## 2021-11-12 NOTE — TELEPHONE ENCOUNTER
Okay to send 30 days with 1 refill and get it scheduled back in for physical, fasting 4 panel ahead, thanks

## 2022-02-16 ENCOUNTER — OFFICE VISIT (OUTPATIENT)
Dept: FAMILY MEDICINE CLINIC | Facility: CLINIC | Age: 47
End: 2022-02-16

## 2022-02-16 VITALS
HEART RATE: 76 BPM | BODY MASS INDEX: 38.94 KG/M2 | SYSTOLIC BLOOD PRESSURE: 160 MMHG | OXYGEN SATURATION: 98 % | WEIGHT: 293.8 LBS | RESPIRATION RATE: 18 BRPM | DIASTOLIC BLOOD PRESSURE: 90 MMHG | TEMPERATURE: 97.8 F | HEIGHT: 73 IN

## 2022-02-16 DIAGNOSIS — R10.84 GENERALIZED ABDOMINAL PAIN: ICD-10-CM

## 2022-02-16 DIAGNOSIS — Z72.0 CURRENT TOBACCO USE: ICD-10-CM

## 2022-02-16 DIAGNOSIS — E11.65 TYPE 2 DIABETES MELLITUS WITH HYPERGLYCEMIA, WITHOUT LONG-TERM CURRENT USE OF INSULIN: Primary | ICD-10-CM

## 2022-02-16 DIAGNOSIS — E78.5 DYSLIPIDEMIA: ICD-10-CM

## 2022-02-16 DIAGNOSIS — E66.01 CLASS 3 SEVERE OBESITY DUE TO EXCESS CALORIES WITH SERIOUS COMORBIDITY AND BODY MASS INDEX (BMI) OF 40.0 TO 44.9 IN ADULT: ICD-10-CM

## 2022-02-16 DIAGNOSIS — I10 ESSENTIAL HYPERTENSION: ICD-10-CM

## 2022-02-16 DIAGNOSIS — S33.8XXA SPRAIN OF GROIN, INITIAL ENCOUNTER: ICD-10-CM

## 2022-02-16 LAB
EXPIRATION DATE: NORMAL
HBA1C MFR BLD: 6 %
Lab: NORMAL

## 2022-02-16 PROCEDURE — 99214 OFFICE O/P EST MOD 30 MIN: CPT | Performed by: FAMILY MEDICINE

## 2022-02-16 PROCEDURE — 83036 HEMOGLOBIN GLYCOSYLATED A1C: CPT | Performed by: FAMILY MEDICINE

## 2022-02-16 RX ORDER — PREDNISONE 1 MG/1
TABLET ORAL
Qty: 45 TABLET | Refills: 0 | Status: SHIPPED | OUTPATIENT
Start: 2022-02-16 | End: 2022-04-20

## 2022-02-16 RX ORDER — SIMVASTATIN 40 MG
40 TABLET ORAL NIGHTLY
Qty: 30 TABLET | Refills: 2 | Status: SHIPPED | OUTPATIENT
Start: 2022-02-16 | End: 2022-06-13

## 2022-02-16 RX ORDER — CARVEDILOL 25 MG/1
25 TABLET ORAL 2 TIMES DAILY WITH MEALS
Qty: 60 TABLET | Refills: 2 | Status: SHIPPED | OUTPATIENT
Start: 2022-02-16 | End: 2022-06-13

## 2022-02-16 NOTE — PROGRESS NOTES
Subjective   Asad Odom is a 46 y.o. male.     Chief Complaint   Patient presents with   • Abdominal Pain   • Diabetes       Abdominal Pain  This is a new problem. The current episode started today. The onset quality is sudden. The problem occurs constantly. The problem has been unchanged. The pain is located in the LLQ. The pain is at a severity of 5/10. The pain is moderate. The quality of the pain is sharp. The abdominal pain does not radiate. Pertinent negatives include no headaches, nausea or weight loss. The pain is aggravated by palpation. The pain is relieved by nothing. He has tried nothing for the symptoms. The treatment provided no relief.   Diabetes  He presents for his follow-up diabetic visit. He has type 2 diabetes mellitus. Pertinent negatives for hypoglycemia include no confusion, dizziness, headaches, nervousness/anxiousness, pallor or seizures. Pertinent negatives for diabetes include no chest pain, no fatigue, no foot ulcerations, no polydipsia, no polyphagia, no weakness and no weight loss. Risk factors for coronary artery disease include male sex, obesity and hypertension. He is following a diabetic and generally healthy diet. He does not see a podiatrist.Eye exam is not current.            I personally reviewed and updated the patient's allergies, medications, problem list, and past medical, surgical, social, and family history. I have reviewed and confirmed the accuracy of the History of Present Illness and Review of Symptoms as documented by the MA/SIDN/RN. Pablo Noyola MD    Family History   Problem Relation Age of Onset   • Other Mother         suicide   • Other Sister         Suicide sibilingd       Social History     Tobacco Use   • Smoking status: Current Some Day Smoker     Types: Cigarettes, Cigars     Start date: 1991     Last attempt to quit: 2019     Years since quitting: 3.1   • Smokeless tobacco: Former User     Types: Chew     Quit date: 7/16/2019   • Tobacco comment: off  and on    Vaping Use   • Vaping Use: Never used   Substance Use Topics   • Alcohol use: Yes     Comment: occasional   • Drug use: No       History reviewed. No pertinent surgical history.    Patient Active Problem List   Diagnosis   • Asthma   • Dyslipidemia   • Elevated serum tryptase   • Essential hypertension   • Mast cell disorder   • Nephrolithiasis   • Anxiety   • GERD (gastroesophageal reflux disease)   • Osteoarthritis   • Carotid artery stenosis   • AK (actinic keratosis)   • Annual visit for general adult medical examination with abnormal findings   • History of tobacco use   • Class 3 severe obesity due to excess calories with serious comorbidity and body mass index (BMI) of 40.0 to 44.9 in adult ([facility])   • Arthritis   • Elevated liver function tests   • Acute right-sided low back pain without sciatica   • Encounter for CDL (commercial driving license) exam   • Hemorrhoids   • Hypercholesterolemia   • Elevated fasting blood sugar   • Type 2 diabetes mellitus with hyperglycemia, without long-term current use of insulin ([facility])   • Generalized abdominal pain         Current Outpatient Medications:   •  Accu-Chek Guide test strip, USE 1 STRIP TO TEST BLOOD SUGAR TWICE DAILY, Disp: , Rfl:   •  albuterol sulfate  (90 Base) MCG/ACT inhaler, Inhale 1 puff., Disp: , Rfl:   •  aspirin 81 MG tablet, Take 81 mg by mouth., Disp: , Rfl:   •  carvedilol (COREG) 25 MG tablet, Take 1 tablet by mouth 2 (Two) Times a Day With Meals., Disp: 60 tablet, Rfl: 2  •  cetirizine (zyrTEC) 5 MG tablet, Take 5 mg by mouth., Disp: , Rfl:   •  neomycin-polymyxin-hydrocortisone (CORTISPORIN) 3.5-37363-1 otic solution, Administer 4 drops into both ears 4 (Four) Times a Day., Disp: 10 mL, Rfl: 1  •  raNITIdine (ZANTAC) 150 MG tablet, TAKE 1 TABLET BY MOUTH TWICE DAILY, Disp: , Rfl:   •  simvastatin (ZOCOR) 40 MG tablet, Take 1 tablet by mouth Every Night., Disp: 30 tablet, Rfl: 2  •  predniSONE (DELTASONE) 5 MG tablet, 40mg x 3 days,  "20mg x 3 days, 10mg x 3 days, 5mg x 3 days, Disp: 45 tablet, Rfl: 0         Review of Systems   Constitutional: Negative for chills, diaphoresis, fatigue and unexpected weight loss.   Eyes: Negative for visual disturbance.   Respiratory: Negative for shortness of breath.    Cardiovascular: Negative for chest pain and palpitations.   Gastrointestinal: Positive for abdominal pain. Negative for nausea.   Endocrine: Negative for polydipsia and polyphagia.   Musculoskeletal: Negative for neck stiffness.   Skin: Negative for color change and pallor.   Neurological: Negative for dizziness, seizures, syncope, weakness and confusion.   Hematological: Negative for adenopathy.   Psychiatric/Behavioral: Negative for hallucinations and suicidal ideas. The patient is not nervous/anxious.        I have reviewed and confirmed the accuracy of the ROS as documented by the MA/LPN/RN Pablo Noyola MD      Objective   /90   Pulse 76   Temp 97.8 °F (36.6 °C)   Resp 18   Ht 185.4 cm (73\")   Wt 133 kg (293 lb 12.8 oz)   SpO2 98%   BMI 38.76 kg/m²   BP Readings from Last 3 Encounters:   02/16/22 160/90   04/26/21 128/98   11/06/20 151/80     Wt Readings from Last 3 Encounters:   02/16/22 133 kg (293 lb 12.8 oz)   04/26/21 133 kg (294 lb)   11/06/20 (!) 139 kg (306 lb 6.4 oz)     Physical Exam  Constitutional:       Appearance: Normal appearance. He is well-developed. He is not diaphoretic.   Cardiovascular:      Rate and Rhythm: Normal rate and regular rhythm.      Pulses: Normal pulses.      Heart sounds: Normal heart sounds, S1 normal and S2 normal. No murmur heard.  No friction rub. No gallop.    Pulmonary:      Effort: Pulmonary effort is normal. No accessory muscle usage.      Breath sounds: Normal breath sounds. No stridor. No decreased breath sounds, wheezing, rhonchi or rales.   Abdominal:      General: Bowel sounds are normal. There is no distension.      Palpations: Abdomen is soft. Abdomen is not rigid. There is no " mass or pulsatile mass.      Tenderness: There is no abdominal tenderness. There is no guarding or rebound. Negative signs include Randolph's sign.      Hernia: No hernia is present. There is no hernia in the left inguinal area.   Genitourinary:     Penis: Normal.       Testes: Normal.         Right: Mass, tenderness or swelling not present.         Left: Mass, tenderness or swelling not present.   Lymphadenopathy:      Lower Body: No right inguinal adenopathy. No left inguinal adenopathy.   Skin:     General: Skin is warm and dry.      Coloration: Skin is not pale.   Neurological:      Mental Status: He is alert and oriented to person, place, and time.      Coordination: Coordination normal.      Gait: Gait normal.         Data / Lab Results:    Hemoglobin A1C   Date Value Ref Range Status   02/16/2022 6.0 % Final   04/26/2021 6.0 % Final   11/06/2020 11.0 % Final        Lab Results   Component Value Date     (H) 10/20/2020    LDL Comment 10/16/2019     (H) 12/11/2017     Lab Results   Component Value Date    CHOL 223 (H) 09/25/2018    CHOL 211 (H) 12/11/2017    CHOL 197 09/24/2016     Lab Results   Component Value Date    TRIG 626 (H) 10/20/2020    TRIG 541 (H) 10/16/2019    TRIG 428 (H) 09/25/2018     Lab Results   Component Value Date    HDL 34 (L) 10/20/2020    HDL 31 (L) 10/16/2019    HDL 39 (L) 09/25/2018     No results found for: PSA  Lab Results   Component Value Date    WBC 6.8 10/20/2020    HGB 16.9 10/20/2020    HCT 49.7 10/20/2020    MCV 88 10/20/2020     10/20/2020     Lab Results   Component Value Date    TSH 1.880 10/20/2020      Lab Results   Component Value Date    GLUCOSE 295 (H) 10/20/2020    BUN 10 10/20/2020    CREATININE 0.82 10/20/2020    EGFRIFNONA 106 10/20/2020    EGFRIFAFRI 123 10/20/2020    BCR 12 10/20/2020    K 4.0 10/20/2020    CO2 28 10/20/2020    CALCIUM 9.1 10/20/2020    PROTENTOTREF 6.9 10/20/2020    ALBUMIN 4.1 10/20/2020    LABIL2 1.5 10/20/2020    AST 50 (H)  10/20/2020    ALT 72 (H) 10/20/2020     Lab Results   Component Value Date    GENO Negative 10/16/2019      No results found for: CRP   No results found for: IRON, TIBC, FERRITIN   Lab Results   Component Value Date    FXIGIOIR99 504 12/11/2017          Assessment/Plan      Medications        Problem List         LOS  Type 2 diabetes.  New onset.  Discussed diet, exercise, lifestyle modification.    Stable today, A1c 6.0, diet controlled. Monitor blood sugars.  Consider nutritionist referral.  Follow-up 3 month.   Consider add lisinopril, plan ophthalmology referral.  Groin sprain.  Benign exam today.  Ice, rehabilitation exercise discussed.  Start prednisone.  Call return if worsening symptoms.  Health maintenance.  Doing well.  Recommend update tetanus shot at [facility].  Flu vaccine declined.  Discussed health maintenance, screening tests, lifestyle modification.  Nephrolithiasis.  Improved/resolved currently. 4 mm left distal ureter per CT 4/20, multiple other stones bilateral kidneys 4 to 5 mm.  Increase fluid intake.    Knee pain.  Left, patellar tendinitis.  OA contributing.  IM steroids given.  Ice, NSAIDs, rehabilitation exercises discussed.  Call return if persistent symptoms.  Gallstone.  Small stone incidental finding per CT 4/20.  Asymptomatic.  Consider surgery referral if symptoms develop.  Hyperlipidemia.  Has been off simvastatin, restart.  Familial with significant elevated triglycerides.  Improved on statin.  Follow-up recheck.  Fatty liver.  With mild elevation LFTs.  Continue fish oil.  Continue to monitor.  Mast cell disorder.  Has been followed by Dr. Medina in the past.  Recommend hematology follow-up, offered second opinion from alternative hematologist he declines currently.  Clinically stable on Claritin/ranitidine.  Lumbar disc disease.  Undergoing PT currently, considering epidural injections.  Followed by [employer].  Asthma.  Improved on pulmonary toilet.  Infrequent baseline  inhaler use currently.  Hypertension.  Elevated today secondary did not take his carvedilol today.  Overall good control on carvedilol.  Cardiolite stress testing benign 2012.  Carotid Dopplers normal 2015.  Discussed low-sodium diet.  Follow-up recheck.  Anxiety.  Recommend Rx he declines.  Follow-up visit for comprehensive physical and screening test scheduled.      Diagnoses and all orders for this visit:    1. Type 2 diabetes mellitus with hyperglycemia, without long-term current use of insulin (HCC) (Primary)  -     POC Glycosylated Hemoglobin (Hb A1C)    2. Current tobacco use    3. Class 3 severe obesity due to excess calories with serious comorbidity and body mass index (BMI) of 40.0 to 44.9 in adult ([facility])    4. Generalized abdominal pain    5. Dyslipidemia  -     simvastatin (ZOCOR) 40 MG tablet; Take 1 tablet by mouth Every Night.  Dispense: 30 tablet; Refill: 2    6. Essential hypertension  -     carvedilol (COREG) 25 MG tablet; Take 1 tablet by mouth 2 (Two) Times a Day With Meals.  Dispense: 60 tablet; Refill: 2    7. Sprain of groin, initial encounter  -     predniSONE (DELTASONE) 5 MG tablet; 40mg x 3 days, 20mg x 3 days, 10mg x 3 days, 5mg x 3 days  Dispense: 45 tablet; Refill: 0              Expected course, medications, and adverse effects discussed.  Call or return if worsening or persistent symptoms.  I wore protective equipment throughout this patient encounter including a mask, gloves, and eye protection.  Hand hygiene was performed before donning protective equipment and after removal when leaving the room. The complete contents of the Assessment and Plan and Data/Lab Results as documented above have been reviewed and addressed by myself with the patient today as part of an ongoing evaluation / treatment plan.  If some of the documentation has been copied from a previous note and is unchanged it indicates that this problem / plan has been assessed today but is stable from a previous visit and  no changes have been recommended.

## 2022-04-19 NOTE — PROGRESS NOTES
Medical Examination      Subjective     Asad Odom is a 46 y.o. male who presents today for a  fitness determination physical exam. The patient reports no problems.  The following portions of the patient's history were reviewed and updated as appropriate: allergies, current medications, past family history, past medical history, past social history, past surgical history and problem list.    I personally reviewed and updated the patient's allergies, medications, problem list, and past medical, surgical, social, and family history. I have reviewed and confirmed the accuracy of the History of Present Illness and Review of Symptoms as documented by the MA/LPN/RN. Richa Watson MA        Family History   Problem Relation Age of Onset   • Other Mother         suicide   • Other Sister         Suicide sibilingd       Social History     Tobacco Use   • Smoking status: Current Some Day Smoker     Types: Cigarettes, Cigars     Start date: 1991     Last attempt to quit: 2019     Years since quitting: 3.2   • Smokeless tobacco: Former User     Types: Chew     Quit date: 7/16/2019   • Tobacco comment: off and on    Vaping Use   • Vaping Use: Never used   Substance Use Topics   • Alcohol use: Yes     Comment: occasional   • Drug use: No       No past surgical history on file.    Patient Active Problem List   Diagnosis   • Asthma   • Dyslipidemia   • Elevated serum tryptase   • Essential hypertension   • Mast cell disorder   • Nephrolithiasis   • Anxiety   • GERD (gastroesophageal reflux disease)   • Osteoarthritis   • Carotid artery stenosis   • AK (actinic keratosis)   • Annual visit for general adult medical examination with abnormal findings   • History of tobacco use   • Class 3 severe obesity due to excess calories with serious comorbidity and body mass index (BMI) of 40.0 to 44.9 in adult (HCC)   • Arthritis   • Elevated liver function tests   • Acute right-sided low back pain  without sciatica   • Encounter for CDL (commercial driving license) exam   • Hemorrhoids   • Hypercholesterolemia   • Elevated fasting blood sugar   • Type 2 diabetes mellitus with hyperglycemia, without long-term current use of insulin (HCC)   • Generalized abdominal pain         Current Outpatient Medications:   •  Accu-Chek Guide test strip, USE 1 STRIP TO TEST BLOOD SUGAR TWICE DAILY, Disp: , Rfl:   •  albuterol sulfate  (90 Base) MCG/ACT inhaler, Inhale 1 puff., Disp: , Rfl:   •  aspirin 81 MG tablet, Take 81 mg by mouth., Disp: , Rfl:   •  carvedilol (COREG) 25 MG tablet, Take 1 tablet by mouth 2 (Two) Times a Day With Meals., Disp: 60 tablet, Rfl: 2  •  cetirizine (zyrTEC) 5 MG tablet, Take 5 mg by mouth., Disp: , Rfl:   •  neomycin-polymyxin-hydrocortisone (CORTISPORIN) 3.5-41320-8 otic solution, Administer 4 drops into both ears 4 (Four) Times a Day., Disp: 10 mL, Rfl: 1  •  predniSONE (DELTASONE) 5 MG tablet, 40mg x 3 days, 20mg x 3 days, 10mg x 3 days, 5mg x 3 days, Disp: 45 tablet, Rfl: 0  •  raNITIdine (ZANTAC) 150 MG tablet, TAKE 1 TABLET BY MOUTH TWICE DAILY, Disp: , Rfl:   •  simvastatin (ZOCOR) 40 MG tablet, Take 1 tablet by mouth Every Night., Disp: 30 tablet, Rfl: 2         Review of Systems   Constitutional: Negative for chills and diaphoresis.   Eyes: Negative for visual disturbance.   Respiratory: Negative for shortness of breath.    Cardiovascular: Negative for chest pain and palpitations.   Gastrointestinal: Negative for abdominal pain and nausea.   Endocrine: Negative for polydipsia and polyphagia.   Musculoskeletal: Negative for neck stiffness.   Skin: Negative for color change and pallor.   Neurological: Negative for seizures and syncope.   Hematological: Negative for adenopathy.   Psychiatric/Behavioral: Negative for hallucinations and suicidal ideas.       I have reviewed and confirmed the accuracy of the ROS as documented by the MA/LPN/RN Richa Watson MA      Objective    There were no vitals taken for this visit.  Wt Readings from Last 3 Encounters:   02/16/22 133 kg (293 lb 12.8 oz)   04/26/21 133 kg (294 lb)   11/06/20 (!) 139 kg (306 lb 6.4 oz)       Vision:   Uncorrected Corrected Horizontal Field of Vision   Right Eye 20/20   >85 degrees   Left Eye  20/25   >85 degrees   Both Eyes  20/20         Applicant can recognize and distinguish among traffic control signals and devices showing standard red, green, and lynette colors.         Monocular Vision?: No    Physical Exam  Constitutional:       Appearance: He is well-developed. He is not diaphoretic.   HENT:      Head: Normocephalic.      Right Ear: Hearing, tympanic membrane, ear canal and external ear normal.      Left Ear: Hearing, tympanic membrane, ear canal and external ear normal.      Nose: Nose normal. No mucosal edema or congestion.      Right Sinus: No maxillary sinus tenderness or frontal sinus tenderness.      Left Sinus: No maxillary sinus tenderness or frontal sinus tenderness.      Mouth/Throat:      Mouth: No oral lesions.      Pharynx: Uvula midline. No oropharyngeal exudate or posterior oropharyngeal erythema.      Tonsils: No tonsillar exudate.   Eyes:      General: Lids are normal.      Conjunctiva/sclera: Conjunctivae normal.      Pupils: Pupils are equal, round, and reactive to light.   Neck:      Thyroid: No thyromegaly.      Vascular: No carotid bruit or JVD.      Trachea: No tracheal deviation.   Cardiovascular:      Rate and Rhythm: Normal rate and regular rhythm.      Heart sounds: Normal heart sounds. No murmur heard.    No friction rub. No gallop.   Pulmonary:      Effort: Pulmonary effort is normal.      Breath sounds: Normal breath sounds. No stridor. No decreased breath sounds, wheezing or rales.   Abdominal:      General: Bowel sounds are normal. There is no distension.      Palpations: Abdomen is soft. There is no mass.      Tenderness: There is no abdominal tenderness. There is no guarding or  rebound.      Hernia: No hernia is present. There is no hernia in the left inguinal area.   Genitourinary:     Penis: Normal.       Testes: Normal.         Right: Mass, tenderness or swelling not present.         Left: Mass, tenderness or swelling not present.   Lymphadenopathy:      Head:      Right side of head: No submental, submandibular, tonsillar, preauricular, posterior auricular or occipital adenopathy.      Left side of head: No submental, submandibular, tonsillar, preauricular, posterior auricular or occipital adenopathy.      Cervical: No cervical adenopathy.      Lower Body: No right inguinal adenopathy. No left inguinal adenopathy.   Skin:     General: Skin is warm and dry.      Coloration: Skin is not pale.   Neurological:      Mental Status: He is alert and oriented to person, place, and time.      Cranial Nerves: No cranial nerve deficit.      Sensory: No sensory deficit.      Motor: No tremor, abnormal muscle tone or seizure activity.      Coordination: Coordination normal.      Gait: Gait normal.      Deep Tendon Reflexes: Reflexes are normal and symmetric.           Assessment/Plan      Medications        Problem List         LOS      CDL physical.  Doing well, cleared to drive.  Hypertension well controlled.  Health maintenance.  Doing well, he agrees update his tetanus shot at [facility].  Discussed health maintenance, screening test, lifestyle modification.  Type 2 diabetes.  New onset.  Discussed diet, exercise, lifestyle modification.    Stable today, A1c 6.0, diet controlled. Monitor blood sugars.  Consider nutritionist referral.  Follow-up 3 month.   Consider add lisinopril, plan ophthalmology referral.  Hypertension.  Good control on carvedilol.  Groin sprain.  Benign exam today.  Ice, rehabilitation exercise discussed.  Start prednisone.  Call return if worsening symptoms.  Nephrolithiasis.  Improved/resolved currently. 4 mm left distal ureter per CT 4/20, multiple other stones bilateral  kidneys 4 to 5 mm.  Increase fluid intake.    Knee pain.  Left, patellar tendinitis.  OA contributing.  IM steroids given.  Ice, NSAIDs, rehabilitation exercises discussed.  Call return if persistent symptoms.  Gallstone.  Small stone incidental finding per CT 4/20.  Asymptomatic.  Consider surgery referral if symptoms develop.  Hyperlipidemia.  Back on simvastatin, recheck fasting labs. Familial with significant elevated triglycerides.  Improved on statin.  Follow-up recheck.  Fatty liver.  With mild elevation LFTs.  Continue fish oil.  Continue to monitor.  Mast cell disorder.  Has been followed by Dr. Medina in the past.  Recommend hematology follow-up, offered second opinion from alternative hematologist he declines currently.  Clinically stable on Claritin/ranitidine.  Lumbar disc disease.  Undergoing PT currently, considering epidural injections.  Followed by [employer].  Asthma.  Improved on pulmonary toilet.  Infrequent baseline inhaler use currently.  Hypertension.  Elevated today secondary did not take his carvedilol today.  Overall good control on carvedilol.  Cardiolite stress testing benign 2012.  Carotid Dopplers normal 2015.  Discussed low-sodium diet.  Follow-up recheck.  Anxiety.  Recommend Rx he declines.  Acute bacterial sinusitis.  Start antibiotics.        There are no diagnoses linked to this encounter.          Expected course, medications, and adverse effects discussed.  Call or return if worsening or persistent symptoms.  I wore protective equipment throughout this patient encounter including a mask, gloves, and eye protection.  Hand hygiene was performed before donning protective equipment and after removal when leaving the room.  The complete contents of the assessment and plan and lab results as documented above have been reviewed and addressed by myself with the patient today as part of an ongoing evaluation / treatment plan.  If some of the documentation has been copied from a  previous note and is unchanged it indicates that this problem / plan has been assessed today but is stable from a previous visit and no changes have been recommended.

## 2022-04-19 NOTE — PROGRESS NOTES
Subjective   Asad Odom is a 46 y.o. male.     Chief Complaint   Patient presents with   • Annual Exam   • Diabetes   • Hypertension   • Hyperlipidemia       The patient is here: for coordination of medical care to discuss health maintenance and disease prevention to follow up on multiple medical problems.  Last comprehensive physical was on 10/20/2020.  Previous physical was performed by  Pablo Noyola MD  Overall has: moderate activity with work/home activities, vigorous activity with work/home activities, exercises 2 - 3 times per week, good appetite, feels well with minor complaints, good energy level, is sleeping poorly, returned to full activity, returned to work full duty and shown improvement in their activity level. Nutrition: appropriate balanced diet, balanced diet, eating a variety of foods and supplemental vitamins. Last tetanus shot was unknown.   Patient's last carotid doppler was: 9/11/2015 Patient's last stress test was: 4/29/2012     Not due for A1c 6.0 at feb check    Hypertension  This is a chronic problem. The current episode started more than 1 year ago. The problem is unchanged. The problem is uncontrolled. Pertinent negatives include no anxiety, blurred vision, chest pain, headaches, malaise/fatigue, neck pain, orthopnea, palpitations, peripheral edema, PND, shortness of breath or sweats. (Patient is feeling well. 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 ) Risk factors for coronary artery disease include dyslipidemia, male gender, obesity and smoking/tobacco exposure. Current antihypertension treatment includes beta blockers. The current treatment provides moderate improvement. There are no compliance problems.  There is no history of angina, kidney disease, CAD/MI, CVA, heart failure, left ventricular hypertrophy, PVD or retinopathy. There is no history of chronic renal disease, coarctation of the aorta, hyperaldosteronism, hypercortisolism,  hyperparathyroidism, a hypertension causing med, pheochromocytoma, renovascular disease, sleep apnea or a thyroid problem.   Hyperlipidemia  This is a chronic problem. The current episode started more than 1 year ago. Recent lipid tests were reviewed and are high. Exacerbating diseases include diabetes and obesity. He has no history of chronic renal disease, hypothyroidism, liver disease or nephrotic syndrome. Pertinent negatives include no chest pain, focal sensory loss, focal weakness, leg pain, myalgias or shortness of breath. Current antihyperlipidemic treatment includes statins. There are no compliance problems.  Risk factors for coronary artery disease include dyslipidemia, hypertension and male sex.   Diabetes  He presents for his follow-up diabetic visit. Pertinent negatives for hypoglycemia include no confusion, dizziness, headaches, hunger, mood changes, nervousness/anxiousness, pallor, seizures, sleepiness, speech difficulty, sweats or tremors. Pertinent negatives for diabetes include no blurred vision, no chest pain, no fatigue, no foot paresthesias, no foot ulcerations, no polydipsia, no polyphagia, no polyuria, no visual change, no weakness and no weight loss. There are no hypoglycemic complications. Pertinent negatives for hypoglycemia complications include no blackouts, no hospitalization, no nocturnal hypoglycemia, no required assistance and no required glucagon injection. Symptoms are stable. There are no diabetic complications. Pertinent negatives for diabetic complications include no autonomic neuropathy, CVA, heart disease, impotence, nephropathy, peripheral neuropathy, PVD or retinopathy. Risk factors for coronary artery disease include family history, male sex, obesity and stress. When asked about current treatments, none were reported. He is compliant with treatment most of the time. His weight is stable. He is following a generally healthy diet. He has not had a previous visit with a  dietitian. He participates in exercise daily. (Patient has changed his diet and he states that he is very very active. He states that he is still watching what he is intake as well. ) An ACE inhibitor/angiotensin II receptor blocker is being taken. He does not see a podiatrist.Eye exam is not current.        Recent Hospitalizations:  No hospitalization(s) within the last year..  ccc      I personally reviewed and updated the patient's allergies, medications, problem list, and past medical, surgical, social, and family history. I have reviewed and confirmed the accuracy of the HPI and ROS as documented by the MA/LPN/RN Pablo Noyola MD    Family History   Problem Relation Age of Onset   • Other Mother         suicide   • Other Sister         Suicide sibilingd       Social History     Tobacco Use   • Smoking status: Current Some Day Smoker     Types: Cigarettes, Cigars     Start date: 1991     Last attempt to quit: 2019     Years since quitting: 3.3   • Smokeless tobacco: Former User     Types: Chew     Quit date: 7/16/2019   • Tobacco comment: off and on    Vaping Use   • Vaping Use: Never used   Substance Use Topics   • Alcohol use: Yes     Comment: occasional   • Drug use: No       No past surgical history on file.    Patient Active Problem List   Diagnosis   • Asthma   • Dyslipidemia   • Elevated serum tryptase   • Essential hypertension   • Mast cell disorder   • Nephrolithiasis   • Anxiety   • GERD (gastroesophageal reflux disease)   • Osteoarthritis   • Carotid artery stenosis   • AK (actinic keratosis)   • Annual visit for general adult medical examination with abnormal findings   • History of tobacco use   • Class 3 severe obesity due to excess calories with serious comorbidity and body mass index (BMI) of 40.0 to 44.9 in adult (HCC)   • Arthritis   • Elevated liver function tests   • Acute right-sided low back pain without sciatica   • Encounter for CDL (commercial driving license) exam   • Hemorrhoids   •  Hypercholesterolemia   • Elevated fasting blood sugar   • Type 2 diabetes mellitus with hyperglycemia, without long-term current use of insulin (HCC)   • Generalized abdominal pain   • Hordeolum internum of left upper eyelid   • Myopia of left eye         Current Outpatient Medications:   •  aspirin 81 MG tablet, Take 81 mg by mouth., Disp: , Rfl:   •  carvedilol (COREG) 25 MG tablet, Take 1 tablet by mouth 2 (Two) Times a Day With Meals., Disp: 60 tablet, Rfl: 2  •  cetirizine (zyrTEC) 5 MG tablet, Take 5 mg by mouth., Disp: , Rfl:   •  raNITIdine (ZANTAC) 150 MG tablet, TAKE 1 TABLET BY MOUTH TWICE DAILY, Disp: , Rfl:   •  simvastatin (ZOCOR) 40 MG tablet, Take 1 tablet by mouth Every Night., Disp: 30 tablet, Rfl: 2  •  azithromycin (ZITHROMAX) 250 MG tablet, Take 2 tablets the first day, then 1 tablet daily for 4 days., Disp: 6 tablet, Rfl: 0    Review of Systems   Constitutional: Negative for chills, diaphoresis, fatigue, malaise/fatigue and unexpected weight loss.   HENT: Negative for trouble swallowing and voice change.    Eyes: Negative for blurred vision and visual disturbance.   Respiratory: Negative for shortness of breath.    Cardiovascular: Negative for chest pain, palpitations, orthopnea and PND.   Gastrointestinal: Negative for abdominal pain and nausea.   Endocrine: Negative for polydipsia, polyphagia and polyuria.   Genitourinary: Negative for hematuria and impotence.   Musculoskeletal: Negative for myalgias, neck pain and neck stiffness.   Skin: Negative for color change and pallor.   Allergic/Immunologic: Negative for immunocompromised state.   Neurological: Negative for dizziness, tremors, focal weakness, seizures, syncope, speech difficulty, weakness and confusion.   Hematological: Negative for adenopathy.   Psychiatric/Behavioral: Negative for hallucinations, sleep disturbance and suicidal ideas. The patient is not nervous/anxious.        I have reviewed and confirmed the accuracy of the ROS as  "documented by the MA/LPN/RN Pablo Noyola MD      Objective   /78   Pulse 73   Temp 98 °F (36.7 °C)   Resp 18   Ht 185.4 cm (73\")   Wt 134 kg (296 lb 3.2 oz)   SpO2 99%   BMI 39.08 kg/m²   BP Readings from Last 3 Encounters:   04/20/22 138/78   04/20/22 138/78   02/16/22 160/90     Wt Readings from Last 3 Encounters:   04/20/22 133 kg (293 lb)   04/20/22 134 kg (296 lb 3.2 oz)   02/16/22 133 kg (293 lb 12.8 oz)     Physical Exam  Constitutional:       Appearance: He is well-developed. He is not diaphoretic.   HENT:      Head: Normocephalic.      Right Ear: Hearing, tympanic membrane, ear canal and external ear normal.      Left Ear: Hearing, tympanic membrane, ear canal and external ear normal.      Nose: Nose normal. No mucosal edema or congestion.      Right Sinus: No maxillary sinus tenderness or frontal sinus tenderness.      Left Sinus: No maxillary sinus tenderness or frontal sinus tenderness.      Mouth/Throat:      Mouth: No oral lesions.      Pharynx: Uvula midline. No oropharyngeal exudate or posterior oropharyngeal erythema.      Tonsils: No tonsillar exudate.   Eyes:      General: Lids are normal.      Extraocular Movements:      Right eye: No nystagmus.      Left eye: No nystagmus.      Conjunctiva/sclera: Conjunctivae normal.      Right eye: Right conjunctiva is not injected. No hemorrhage.     Left eye: Left conjunctiva is not injected. No hemorrhage.     Pupils: Pupils are equal, round, and reactive to light.      Funduscopic exam:     Right eye: No hemorrhage, exudate, AV nicking, arteriolar narrowing or papilledema.         Left eye: No hemorrhage, exudate, AV nicking, arteriolar narrowing or papilledema.   Neck:      Thyroid: No thyromegaly.      Vascular: No carotid bruit or JVD.      Trachea: No tracheal deviation.   Cardiovascular:      Rate and Rhythm: Normal rate and regular rhythm.      Heart sounds: Normal heart sounds. No murmur heard.    No friction rub. No gallop. "   Pulmonary:      Effort: Pulmonary effort is normal.      Breath sounds: Normal breath sounds. No stridor. No decreased breath sounds, wheezing or rales.   Abdominal:      General: Bowel sounds are normal. There is no distension.      Palpations: Abdomen is soft. There is no mass.      Tenderness: There is no abdominal tenderness. There is no guarding or rebound.      Hernia: No hernia is present. There is no hernia in the left inguinal area.   Genitourinary:     Penis: Normal.       Testes: Normal.         Right: Mass, tenderness or swelling not present.         Left: Mass, tenderness or swelling not present.   Lymphadenopathy:      Head:      Right side of head: No submental, submandibular, tonsillar, preauricular, posterior auricular or occipital adenopathy.      Left side of head: No submental, submandibular, tonsillar, preauricular, posterior auricular or occipital adenopathy.      Cervical: No cervical adenopathy.      Lower Body: No right inguinal adenopathy. No left inguinal adenopathy.   Skin:     General: Skin is warm and dry.      Coloration: Skin is not pale.      Comments: Small insect bite right wrist, healing well, not inflamed   Neurological:      Mental Status: He is alert and oriented to person, place, and time.      Cranial Nerves: No cranial nerve deficit.      Sensory: No sensory deficit.      Coordination: Coordination normal.      Gait: Gait normal.      Deep Tendon Reflexes: Reflexes are normal and symmetric.         Data / Lab Results:    Hemoglobin A1C   Date Value Ref Range Status   02/16/2022 6.0 % Final   04/26/2021 6.0 % Final   11/06/2020 11.0 % Final        Lab Results   Component Value Date     (H) 10/20/2020    LDL Comment 10/16/2019     (H) 12/11/2017     Lab Results   Component Value Date    CHOL 223 (H) 09/25/2018    CHOL 211 (H) 12/11/2017    CHOL 197 09/24/2016     Lab Results   Component Value Date    TRIG 626 (H) 10/20/2020    TRIG 541 (H) 10/16/2019    TRIG  428 (H) 09/25/2018     Lab Results   Component Value Date    HDL 34 (L) 10/20/2020    HDL 31 (L) 10/16/2019    HDL 39 (L) 09/25/2018     No results found for: PSA  Lab Results   Component Value Date    WBC 6.8 10/20/2020    HGB 16.9 10/20/2020    HCT 49.7 10/20/2020    MCV 88 10/20/2020     10/20/2020     Lab Results   Component Value Date    TSH 1.880 10/20/2020      Lab Results   Component Value Date    GLUCOSE 295 (H) 10/20/2020    BUN 10 10/20/2020    CREATININE 0.82 10/20/2020    EGFRIFNONA 106 10/20/2020    EGFRIFAFRI 123 10/20/2020    BCR 12 10/20/2020    K 4.0 10/20/2020    CO2 28 10/20/2020    CALCIUM 9.1 10/20/2020    PROTENTOTREF 6.9 10/20/2020    ALBUMIN 4.1 10/20/2020    LABIL2 1.5 10/20/2020    AST 50 (H) 10/20/2020    ALT 72 (H) 10/20/2020     Lab Results   Component Value Date    GENO Negative 10/16/2019      No results found for: CRP   No results found for: IRON, TIBC, FERRITIN   Lab Results   Component Value Date    MLMNEIVD09 504 12/11/2017        Age-appropriate Screening Schedule:  Refer to the list below for future screening recommendations based on patient's age, sex and/or medical conditions. Orders for these recommended tests are listed in the plan section. The patient has been provided with a written plan.    Health Maintenance   Topic Date Due   • URINE MICROALBUMIN  Never done   • TDAP/TD VACCINES (1 - Tdap) Never done   • DIABETIC FOOT EXAM  Never done   • LIPID PANEL  10/20/2021   • DIABETIC EYE EXAM  07/19/2022   • INFLUENZA VACCINE  08/01/2022   • HEMOGLOBIN A1C  08/16/2022           Assessment/Plan      Medications        Problem List         LOS      Physical.  Doing well, he agrees update his tetanus shot at [employer].  Discussed health maintenance, screening test, lifestyle modification.  Type 2 diabetes.  New onset.  Discussed diet, exercise, lifestyle modification.    Stable today, A1c 6.0, diet controlled. Monitor blood sugars.  Consider nutritionist referral.  Follow-up  3 month.   Consider add lisinopril, plan ophthalmology referral.  Hypertension.  Good control on carvedilol.  Groin sprain.  Benign exam today.  Ice, rehabilitation exercise discussed.  Start prednisone.  Call return if worsening symptoms.  Nephrolithiasis.  Improved/resolved currently. 4 mm left distal ureter per CT 4/20, multiple other stones bilateral kidneys 4 to 5 mm.  Increase fluid intake.    Knee pain.  Left, patellar tendinitis.  OA contributing.  IM steroids given.  Ice, NSAIDs, rehabilitation exercises discussed.  Call return if persistent symptoms.  Gallstone.  Small stone incidental finding per CT 4/20.  Asymptomatic.  Consider surgery referral if symptoms develop.  Hyperlipidemia.  Back on simvastatin, recheck fasting labs. Familial with significant elevated triglycerides.  Improved on statin.  Follow-up recheck.  Fatty liver.  With mild elevation LFTs.  Continue fish oil.  Continue to monitor.  Mast cell disorder.  Has been followed by Dr. Medina in the past.  Recommend hematology follow-up, offered second opinion from alternative hematologist he declines currently.  Clinically stable on Claritin/ranitidine.  Lumbar disc disease.  Undergoing PT currently, considering epidural injections.  Followed by [employer].  Asthma.  Improved on pulmonary toilet.  Infrequent baseline inhaler use currently.  Hypertension.  Elevated today secondary did not take his carvedilol today.  Overall good control on carvedilol.  Cardiolite stress testing benign 2012.  Carotid Dopplers normal 2015.  Discussed low-sodium diet.  Follow-up recheck.  Anxiety.  Recommend Rx he declines.  Acute bacterial sinusitis.  Start antibiotics.       Diagnoses and all orders for this visit:    1. Annual visit for general adult medical examination with abnormal findings (Primary)    2. Type 2 diabetes mellitus with hyperglycemia, without long-term current use of insulin (HCC)    3. Essential hypertension    4. Dyslipidemia    5. Class  3 severe obesity due to excess calories with serious comorbidity and body mass index (BMI) of 40.0 to 44.9 in adult (HCC)    6. History of tobacco use    7. Upper respiratory tract infection, unspecified type  -     azithromycin (ZITHROMAX) 250 MG tablet; Take 2 tablets the first day, then 1 tablet daily for 4 days.  Dispense: 6 tablet; Refill: 0    8. Nephrolithiasis    9. Mast cell disorder              Expected course, medications, and adverse effects discussed.  Call or return if worsening or persistent symptoms.  I wore protective equipment throughout this patient encounter including a mask, gloves, and eye protection.  Hand hygiene was performed before donning protective equipment and after removal when leaving the room. The complete contents of the Assessment and Plan and Data / Lab Results as documented above have been reviewed and addressed by myself with the patient today as part of an ongoing evaluation / treatment plan.  If some of the documentation has been copied from a previous note and is unchanged it indicates that this problem / plan has been assessed today but is stable from a previous visit and no changes have been recommended.

## 2022-04-20 ENCOUNTER — CLINICAL SUPPORT (OUTPATIENT)
Dept: FAMILY MEDICINE CLINIC | Facility: CLINIC | Age: 47
End: 2022-04-20

## 2022-04-20 ENCOUNTER — OFFICE VISIT (OUTPATIENT)
Dept: FAMILY MEDICINE CLINIC | Facility: CLINIC | Age: 47
End: 2022-04-20

## 2022-04-20 VITALS
OXYGEN SATURATION: 99 % | DIASTOLIC BLOOD PRESSURE: 78 MMHG | RESPIRATION RATE: 18 BRPM | HEIGHT: 73 IN | SYSTOLIC BLOOD PRESSURE: 138 MMHG | WEIGHT: 296.2 LBS | BODY MASS INDEX: 39.25 KG/M2 | HEART RATE: 73 BPM | TEMPERATURE: 98 F

## 2022-04-20 VITALS
BODY MASS INDEX: 38.83 KG/M2 | TEMPERATURE: 98 F | WEIGHT: 293 LBS | HEIGHT: 73 IN | RESPIRATION RATE: 18 BRPM | SYSTOLIC BLOOD PRESSURE: 138 MMHG | HEART RATE: 74 BPM | DIASTOLIC BLOOD PRESSURE: 78 MMHG | OXYGEN SATURATION: 99 %

## 2022-04-20 DIAGNOSIS — D47.09 MAST CELL DISORDER: ICD-10-CM

## 2022-04-20 DIAGNOSIS — E78.5 DYSLIPIDEMIA: Chronic | ICD-10-CM

## 2022-04-20 DIAGNOSIS — I10 ESSENTIAL HYPERTENSION: Chronic | ICD-10-CM

## 2022-04-20 DIAGNOSIS — Z87.891 HISTORY OF TOBACCO USE: ICD-10-CM

## 2022-04-20 DIAGNOSIS — E66.01 CLASS 3 SEVERE OBESITY DUE TO EXCESS CALORIES WITH SERIOUS COMORBIDITY AND BODY MASS INDEX (BMI) OF 40.0 TO 44.9 IN ADULT: ICD-10-CM

## 2022-04-20 DIAGNOSIS — N20.0 NEPHROLITHIASIS: ICD-10-CM

## 2022-04-20 DIAGNOSIS — J06.9 UPPER RESPIRATORY TRACT INFECTION, UNSPECIFIED TYPE: ICD-10-CM

## 2022-04-20 DIAGNOSIS — Z02.4 ENCOUNTER FOR CDL (COMMERCIAL DRIVING LICENSE) EXAM: Primary | ICD-10-CM

## 2022-04-20 DIAGNOSIS — Z00.01 ANNUAL VISIT FOR GENERAL ADULT MEDICAL EXAMINATION WITH ABNORMAL FINDINGS: Primary | ICD-10-CM

## 2022-04-20 DIAGNOSIS — E11.65 TYPE 2 DIABETES MELLITUS WITH HYPERGLYCEMIA, WITHOUT LONG-TERM CURRENT USE OF INSULIN: ICD-10-CM

## 2022-04-20 PROBLEM — H52.12 MYOPIA OF LEFT EYE: Status: ACTIVE | Noted: 2020-11-02

## 2022-04-20 PROBLEM — H00.024 HORDEOLUM INTERNUM OF LEFT UPPER EYELID: Status: ACTIVE | Noted: 2021-07-19

## 2022-04-20 LAB
BILIRUB BLD-MCNC: NEGATIVE MG/DL
CLARITY, POC: CLEAR
COLOR UR: YELLOW
GLUCOSE UR STRIP-MCNC: NEGATIVE MG/DL
KETONES UR QL: NEGATIVE
LEUKOCYTE EST, POC: NEGATIVE
NITRITE UR-MCNC: NEGATIVE MG/ML
PH UR: 6.5 [PH] (ref 5–8)
PROT UR STRIP-MCNC: NEGATIVE MG/DL
RBC # UR STRIP: NEGATIVE /UL
SP GR UR: 1.02 (ref 1–1.03)
UROBILINOGEN UR QL: NORMAL

## 2022-04-20 PROCEDURE — 99396 PREV VISIT EST AGE 40-64: CPT | Performed by: FAMILY MEDICINE

## 2022-04-20 PROCEDURE — 99213 OFFICE O/P EST LOW 20 MIN: CPT | Performed by: FAMILY MEDICINE

## 2022-04-20 PROCEDURE — 81002 URINALYSIS NONAUTO W/O SCOPE: CPT | Performed by: FAMILY MEDICINE

## 2022-04-20 PROCEDURE — DOTPHY: Performed by: FAMILY MEDICINE

## 2022-04-20 RX ORDER — CYCLOBENZAPRINE HYDROCHLORIDE 15 MG/1
CAPSULE, EXTENDED RELEASE ORAL
COMMUNITY
End: 2022-04-20

## 2022-04-20 RX ORDER — AZITHROMYCIN 250 MG/1
TABLET, FILM COATED ORAL
Qty: 6 TABLET | Refills: 0 | Status: SHIPPED | OUTPATIENT
Start: 2022-04-20 | End: 2022-11-18

## 2022-04-22 DIAGNOSIS — E78.00 HYPERCHOLESTEROLEMIA: Primary | ICD-10-CM

## 2022-04-22 RX ORDER — FENOFIBRATE 145 MG/1
145 TABLET, COATED ORAL DAILY
Qty: 90 TABLET | Refills: 3 | Status: SHIPPED | OUTPATIENT
Start: 2022-04-22

## 2022-06-13 DIAGNOSIS — I10 ESSENTIAL HYPERTENSION: ICD-10-CM

## 2022-06-13 DIAGNOSIS — E78.5 DYSLIPIDEMIA: ICD-10-CM

## 2022-06-13 RX ORDER — CARVEDILOL 25 MG/1
TABLET ORAL
Qty: 60 TABLET | Refills: 0 | Status: SHIPPED | OUTPATIENT
Start: 2022-06-13 | End: 2022-07-12

## 2022-06-13 RX ORDER — SIMVASTATIN 40 MG
TABLET ORAL
Qty: 30 TABLET | Refills: 0 | Status: SHIPPED | OUTPATIENT
Start: 2022-06-13 | End: 2022-07-12

## 2022-07-12 DIAGNOSIS — E78.5 DYSLIPIDEMIA: ICD-10-CM

## 2022-07-12 DIAGNOSIS — I10 ESSENTIAL HYPERTENSION: ICD-10-CM

## 2022-07-12 RX ORDER — CARVEDILOL 25 MG/1
TABLET ORAL
Qty: 60 TABLET | Refills: 0 | Status: SHIPPED | OUTPATIENT
Start: 2022-07-12 | End: 2022-08-11

## 2022-07-12 RX ORDER — SIMVASTATIN 40 MG
TABLET ORAL
Qty: 30 TABLET | Refills: 0 | Status: SHIPPED | OUTPATIENT
Start: 2022-07-12 | End: 2022-08-11

## 2022-08-01 ENCOUNTER — TELEPHONE (OUTPATIENT)
Dept: FAMILY MEDICINE CLINIC | Facility: CLINIC | Age: 47
End: 2022-08-01

## 2022-08-01 NOTE — TELEPHONE ENCOUNTER
Caller: Asad Odom    Relationship: Self    Best call back number: [phone]    What medication are you requesting: EAR DROPS    What are your current symptoms:   PAIN IN RIGHT EAR    How long have you been experiencing symptoms:   JUST STARTED YESTERDAY 7/31/22    Have you had these symptoms before:    [x] Yes  [] No    Have you been treated for these symptoms before:   [x] Yes  [] No    If a prescription is needed, what is your preferred pharmacy and phone number:    [facility] [place] [facility] [address]  - [phone] [facility] [phone]   [phone]    Additional notes: PATIENT STATED HE GETS THESE EAR INFECTION OFTEN AND IS REQUESTING A PRESCRIPTION FOR THIS.    PATIENT IS ALSO REQUESTING A CHEAP PRESCRIPTION IF POSSIBLE.      PLEASE CONTACT PATIENT IF NEEDED.

## 2022-08-03 DIAGNOSIS — H92.09 OTALGIA, UNSPECIFIED LATERALITY: Primary | ICD-10-CM

## 2022-08-03 RX ORDER — CEPHALEXIN 500 MG/1
500 CAPSULE ORAL 3 TIMES DAILY
Qty: 30 CAPSULE | Refills: 0 | Status: SHIPPED | OUTPATIENT
Start: 2022-08-03 | End: 2022-10-19

## 2022-08-11 DIAGNOSIS — E78.5 DYSLIPIDEMIA: ICD-10-CM

## 2022-08-11 DIAGNOSIS — I10 ESSENTIAL HYPERTENSION: ICD-10-CM

## 2022-08-11 RX ORDER — SIMVASTATIN 40 MG
TABLET ORAL
Qty: 30 TABLET | Refills: 0 | Status: SHIPPED | OUTPATIENT
Start: 2022-08-11 | End: 2022-11-02

## 2022-08-11 RX ORDER — CARVEDILOL 25 MG/1
TABLET ORAL
Qty: 60 TABLET | Refills: 0 | Status: SHIPPED | OUTPATIENT
Start: 2022-08-11 | End: 2022-09-12

## 2022-09-10 DIAGNOSIS — I10 ESSENTIAL HYPERTENSION: ICD-10-CM

## 2022-09-12 RX ORDER — CARVEDILOL 25 MG/1
TABLET ORAL
Qty: 60 TABLET | Refills: 0 | Status: SHIPPED | OUTPATIENT
Start: 2022-09-12 | End: 2022-11-02

## 2022-10-17 PROBLEM — E66.812 CLASS 2 SEVERE OBESITY DUE TO EXCESS CALORIES WITH SERIOUS COMORBIDITY AND BODY MASS INDEX (BMI) OF 38.0 TO 38.9 IN ADULT: Status: ACTIVE | Noted: 2019-10-16

## 2022-10-17 NOTE — PROGRESS NOTES
Subjective   Asad Odom is a 47 y.o. male.     Chief Complaint   Patient presents with   • Diabetes   • Hypertension   • Hyperlipidemia       Hypertension  This is a chronic problem. The current episode started more than 1 year ago. The problem is unchanged. The problem is uncontrolled. Pertinent negatives include no anxiety, blurred vision, chest pain, headaches, malaise/fatigue, neck pain, orthopnea, palpitations, peripheral edema, PND, shortness of breath or sweats. (Patient is feeling well. 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 ) Risk factors for coronary artery disease include dyslipidemia, male gender, obesity and smoking/tobacco exposure. Current antihypertension treatment includes beta blockers. The current treatment provides moderate improvement. There are no compliance problems.  There is no history of angina, kidney disease, CAD/MI, CVA, heart failure, left ventricular hypertrophy, PVD or retinopathy. There is no history of chronic renal disease, coarctation of the aorta, hyperaldosteronism, hypercortisolism, hyperparathyroidism, a hypertension causing med, pheochromocytoma, renovascular disease, sleep apnea or a thyroid problem.   Hyperlipidemia  This is a chronic problem. The current episode started more than 1 year ago. Recent lipid tests were reviewed and are high. Exacerbating diseases include diabetes and obesity. He has no history of chronic renal disease, hypothyroidism, liver disease or nephrotic syndrome. Pertinent negatives include no chest pain, focal sensory loss, focal weakness, leg pain, myalgias or shortness of breath. Current antihyperlipidemic treatment includes statins. There are no compliance problems.  Risk factors for coronary artery disease include dyslipidemia, hypertension and male sex.   Diabetes  He presents for his follow-up diabetic visit. Pertinent negatives for hypoglycemia include no confusion, dizziness, headaches, hunger,  mood changes, nervousness/anxiousness, pallor, seizures, sleepiness, speech difficulty, sweats or tremors. Pertinent negatives for diabetes include no blurred vision, no chest pain, no fatigue, no foot paresthesias, no foot ulcerations, no polydipsia, no polyphagia, no polyuria, no visual change, no weakness and no weight loss. There are no hypoglycemic complications. Pertinent negatives for hypoglycemia complications include no blackouts, no hospitalization, no nocturnal hypoglycemia, no required assistance and no required glucagon injection. Symptoms are stable. There are no diabetic complications. Pertinent negatives for diabetic complications include no autonomic neuropathy, CVA, heart disease, impotence, nephropathy, peripheral neuropathy, PVD or retinopathy. Risk factors for coronary artery disease include family history, male sex, obesity and stress. When asked about current treatments, none were reported. He is compliant with treatment most of the time. His weight is stable. He is following a generally healthy diet. He has not had a previous visit with a dietitian. He participates in exercise daily. (Patient has changed his diet and he states that he is very very active. He states that he is still watching what he is intake as well.     Patient does not check his sugar at home. His sugar was 143 this morning fasting. ) An ACE inhibitor/angiotensin II receptor blocker is being taken. He does not see a podiatrist.Eye exam is not current.            I personally reviewed and updated the patient's allergies, medications, problem list, and past medical, surgical, social, and family history. I have reviewed and confirmed the accuracy of the History of Present Illness and Review of Symptoms as documented by the MA/NOEMY/RN. Pablo Noyola MD    Family History   Problem Relation Age of Onset   • Other Mother         suicide   • Other Sister         Suicide sibilingd       Social History     Tobacco Use   • Smoking  status: Some Days     Packs/day: 0.25     Years: 31.00     Pack years: 7.75     Types: Cigarettes, Cigars     Start date: 1991   • Smokeless tobacco: Former     Types: Chew     Quit date: 7/16/2019   • Tobacco comments:     off and on    Vaping Use   • Vaping Use: Never used   Substance Use Topics   • Alcohol use: Yes     Comment: occasional   • Drug use: No       Past Surgical History:   Procedure Laterality Date   • BONE BIOPSY     • BONE MARROW BIOPSY     • CYSTOSCOPY BLADDER STONE LITHOTRIPSY     • KNEE MENISCAL REPAIR Left    • MANDIBLE FRACTURE SURGERY         Patient Active Problem List   Diagnosis   • Asthma   • Dyslipidemia   • Elevated serum tryptase   • Essential hypertension   • Mast cell disorder   • Nephrolithiasis   • Anxiety   • GERD (gastroesophageal reflux disease)   • Osteoarthritis   • Carotid artery stenosis   • AK (actinic keratosis)   • Annual visit for general adult medical examination with abnormal findings   • History of tobacco use   • Class 3 severe obesity due to excess calories with serious comorbidity and body mass index (BMI) of 40.0 to 44.9 in adult ([facility])   • Arthritis   • Elevated liver function tests   • Acute right-sided low back pain without sciatica   • Encounter for CDL (commercial driving license) exam   • Hemorrhoids   • Hypercholesterolemia   • Elevated fasting blood sugar   • Type 2 diabetes mellitus with hyperglycemia, without long-term current use of insulin ([facility])   • Generalized abdominal pain   • Hordeolum internum of left upper eyelid   • Myopia of left eye         Current Outpatient Medications:   •  aspirin 81 MG tablet, Take 81 mg by mouth., Disp: , Rfl:   •  azithromycin (ZITHROMAX) 250 MG tablet, Take 2 tablets the first day, then 1 tablet daily for 4 days., Disp: 6 tablet, Rfl: 0  •  carvedilol (COREG) 25 MG tablet, TAKE 1 TABLET BY MOUTH TWICE DAILY WITH MEALS, Disp: 60 tablet, Rfl: 0  •  cetirizine (zyrTEC) 5 MG tablet, Take 5 mg by mouth., Disp: , Rfl:   •   "fenofibrate (TRICOR) 145 MG tablet, Take 1 tablet by mouth Daily., Disp: 90 tablet, Rfl: 3  •  raNITIdine (ZANTAC) 150 MG tablet, TAKE 1 TABLET BY MOUTH TWICE DAILY, Disp: , Rfl:   •  simvastatin (ZOCOR) 40 MG tablet, Take 1 tablet by mouth nightly, Disp: 30 tablet, Rfl: 0          Review of Systems   Constitutional: Negative for chills, diaphoresis, fatigue, malaise/fatigue and unexpected weight loss.   Eyes: Negative for blurred vision and visual disturbance.   Respiratory: Negative for shortness of breath.    Cardiovascular: Negative for chest pain, palpitations, orthopnea and PND.   Gastrointestinal: Negative for abdominal pain and nausea.   Endocrine: Negative for polydipsia, polyphagia and polyuria.   Genitourinary: Negative for impotence.   Musculoskeletal: Negative for myalgias, neck pain and neck stiffness.   Skin: Negative for color change and pallor.   Neurological: Negative for dizziness, tremors, focal weakness, seizures, syncope, speech difficulty, weakness and confusion.   Hematological: Negative for adenopathy.   Psychiatric/Behavioral: Negative for hallucinations and suicidal ideas. The patient is not nervous/anxious.        I have reviewed and confirmed the accuracy of the ROS as documented by the MA/LPN/RN Pablo Noyola MD      Objective   /82   Pulse 68   Temp 98.2 °F (36.8 °C)   Resp 20   Ht 185.4 cm (73\")   Wt (!) 138 kg (304 lb)   SpO2 98%   BMI 40.11 kg/m²   BP Readings from Last 3 Encounters:   10/19/22 132/82   04/20/22 138/78   04/20/22 138/78     Wt Readings from Last 3 Encounters:   10/19/22 (!) 138 kg (304 lb)   04/20/22 133 kg (293 lb)   04/20/22 134 kg (296 lb 3.2 oz)           Data / Lab Results:    Hemoglobin A1C   Date Value Ref Range Status   10/19/2022 6.7 % Final   02/16/2022 6.0 % Final   04/26/2021 6.0 % Final        Lab Results   Component Value Date    LDL 69 04/20/2022     (H) 10/20/2020    LDL Comment 10/16/2019     Lab Results   Component Value Date "    CHOL 223 (H) 09/25/2018    CHOL 211 (H) 12/11/2017    CHOL 197 09/24/2016     Lab Results   Component Value Date    TRIG 341 (H) 04/20/2022    TRIG 626 (H) 10/20/2020    TRIG 541 (H) 10/16/2019     Lab Results   Component Value Date    HDL 34 (L) 04/20/2022    HDL 34 (L) 10/20/2020    HDL 31 (L) 10/16/2019     No results found for: PSA  Lab Results   Component Value Date    WBC 6.0 04/20/2022    HGB 15.8 04/20/2022    HCT 46.4 04/20/2022    MCV 89 04/20/2022     04/20/2022     Lab Results   Component Value Date    TSH 1.340 04/20/2022      Lab Results   Component Value Date    GLUCOSE 142 (H) 04/20/2022    BUN 10 04/20/2022    CREATININE 0.96 04/20/2022    EGFRIFNONA 106 10/20/2020    EGFRIFAFRI 123 10/20/2020    BCR 10 04/20/2022    K 4.7 04/20/2022    CO2 27 04/20/2022    CALCIUM 9.2 04/20/2022    PROTENTOTREF 6.7 04/20/2022    ALBUMIN 4.5 04/20/2022    LABIL2 2.0 04/20/2022    AST 30 04/20/2022    ALT 38 04/20/2022     Lab Results   Component Value Date    GENO Negative 10/16/2019      No results found for: CRP   No results found for: IRON, TIBC, FERRITIN   Lab Results   Component Value Date    MKEMWXSD93 504 12/11/2017            Physical Exam  Constitutional:       Appearance: Normal appearance. He is well-developed. He is not diaphoretic.   Eyes:      General: Lids are normal.      Extraocular Movements:      Right eye: No nystagmus.      Left eye: No nystagmus.      Conjunctiva/sclera: Conjunctivae normal.      Right eye: Right conjunctiva is not injected. No hemorrhage.     Left eye: Left conjunctiva is not injected. No hemorrhage.     Pupils: Pupils are equal, round, and reactive to light.      Funduscopic exam:     Right eye: No hemorrhage, exudate, AV nicking, arteriolar narrowing or papilledema.         Left eye: No hemorrhage, exudate, AV nicking, arteriolar narrowing or papilledema.   Cardiovascular:      Rate and Rhythm: Normal rate and regular rhythm.      Pulses: Normal pulses.      Heart  sounds: Normal heart sounds, S1 normal and S2 normal. No murmur heard.    No friction rub. No gallop.   Pulmonary:      Effort: Pulmonary effort is normal. No accessory muscle usage.      Breath sounds: Normal breath sounds. No stridor. No decreased breath sounds, wheezing, rhonchi or rales.   Abdominal:      General: Bowel sounds are normal. There is no distension.      Palpations: Abdomen is soft. Abdomen is not rigid. There is no mass or pulsatile mass.      Tenderness: There is no abdominal tenderness. There is no guarding or rebound. Negative signs include Randolph's sign.      Hernia: No hernia is present.   Skin:     General: Skin is warm and dry.      Coloration: Skin is not pale.   Neurological:      Mental Status: He is alert and oriented to person, place, and time.      Coordination: Coordination normal.      Gait: Gait normal.           Assessment & Plan      Medications        Problem List         LOS    Type 2 diabetes.  New onset.  Discussed diet, exercise, lifestyle modification.    Stable today, A1c 6.7, diet controlled. Monitor blood sugars.  Consider nutritionist referral.  Follow-up 3 month.   Consider add lisinopril, plan ophthalmology referral.  Groin sprain.  Benign exam today.  Ice, rehabilitation exercise discussed.  Start prednisone.  Call return if worsening symptoms.  Health maintenance.  Doing well.  Recommend update tetanus shot at [email or web address].  Flu vaccine declined.  Discussed health maintenance, screening tests, lifestyle modification.  Nephrolithiasis.  Improved/resolved currently. 4 mm left distal ureter per CT 4/20, multiple other stones bilateral kidneys 4 to 5 mm.  Increase fluid intake.    Knee pain.  Left, patellar tendinitis.  OA contributing.  IM steroids given.  Ice, NSAIDs, rehabilitation exercises discussed.  Call return if persistent symptoms.  Gallstone.  Small stone incidental finding per CT 4/20.  Asymptomatic.  Consider surgery referral if symptoms  develop.  Hyperlipidemia.  Has been off simvastatin, restart.  Familial with significant elevated triglycerides.  Improved on statin.  Follow-up recheck.  Fatty liver.  With mild elevation LFTs.  Continue fish oil.  Continue to monitor.  Mast cell disorder.  Has been followed by Dr. Medina in the past.  Recommend hematology follow-up, offered second opinion from alternative hematologist he declines currently.  Clinically stable on Claritin/ranitidine.  Lumbar disc disease.  Undergoing PT currently, considering epidural injections.  Followed by [employer].  Asthma.  Improved on pulmonary toilet.  Infrequent baseline inhaler use currently.  Hypertension.  Elevated today secondary did not take his carvedilol today.  Overall good control on carvedilol.  Cardiolite stress testing benign 2012.  Carotid Dopplers normal 2015.  Discussed low-sodium diet.  Follow-up recheck.  Anxiety.  Recommend Rx he declines.  Family history of prostate cancer.  His brother.  He believes he has been diagnosed with prostate cancer but he is not sure if it is prostate or colon cancer.  Check PSA.  he will check with his brother/plan proceed to colonoscopy if he has developed colon cancer.  Follow-up visit for comprehensive physical and screening test plannef.        Diagnoses and all orders for this visit:    1. Type 2 diabetes mellitus with hyperglycemia, without long-term current use of insulin ([facility]) (Primary)  -     POCT urinalysis dipstick, manual  -     POCT Glucose  -     POC Glycosylated Hemoglobin (Hb A1C)  -     POCT microalbumin    2. Essential hypertension    3. Hypercholesterolemia    4. History of tobacco use    5. Class 3 severe obesity due to excess calories with serious comorbidity and body mass index (BMI) of 40.0 to 44.9 in adult ([facility])  Assessment & Plan:  Patient's (Body mass index is 40.11 kg/m².) indicates that they are morbidly obese (BMI > 40 or > 35 with obesity - related health condition) with health conditions  that include hypertension, diabetes mellitus and dyslipidemias . Weight is unchanged. BMI is is above average; BMI management plan is completed. We discussed portion control and increasing exercise.       6. Hematuria, unspecified type  -     Urine Culture - Urine, Urine, Random Void          Expected course, medications, and adverse effects discussed.  Call or return if worsening or persistent symptoms.  I wore protective equipment throughout this patient encounter including a mask, gloves, and eye protection.  Hand hygiene was performed before donning protective equipment and after removal when leaving the room.  The complete contents of the Assessment and Plan and Data/Labs Results as documented above have been reviewed and addressed by myself with the patient today as part of an ongoing evaluation / treatment plan.  If some of the documentation has been copied from a previous note and is unchanged it indicates that this problem / plan has been assessed today but is stable from a previous visit and no changes have been recommended.

## 2022-10-19 ENCOUNTER — OFFICE VISIT (OUTPATIENT)
Dept: FAMILY MEDICINE CLINIC | Facility: CLINIC | Age: 47
End: 2022-10-19

## 2022-10-19 VITALS
SYSTOLIC BLOOD PRESSURE: 132 MMHG | HEIGHT: 73 IN | TEMPERATURE: 98.2 F | WEIGHT: 304 LBS | DIASTOLIC BLOOD PRESSURE: 82 MMHG | BODY MASS INDEX: 40.29 KG/M2 | RESPIRATION RATE: 20 BRPM | OXYGEN SATURATION: 98 % | HEART RATE: 68 BPM

## 2022-10-19 DIAGNOSIS — Z87.891 HISTORY OF TOBACCO USE: ICD-10-CM

## 2022-10-19 DIAGNOSIS — I10 ESSENTIAL HYPERTENSION: Chronic | ICD-10-CM

## 2022-10-19 DIAGNOSIS — R31.9 HEMATURIA, UNSPECIFIED TYPE: ICD-10-CM

## 2022-10-19 DIAGNOSIS — E66.01 CLASS 3 SEVERE OBESITY DUE TO EXCESS CALORIES WITH SERIOUS COMORBIDITY AND BODY MASS INDEX (BMI) OF 40.0 TO 44.9 IN ADULT: ICD-10-CM

## 2022-10-19 DIAGNOSIS — E78.00 HYPERCHOLESTEROLEMIA: ICD-10-CM

## 2022-10-19 DIAGNOSIS — E11.65 TYPE 2 DIABETES MELLITUS WITH HYPERGLYCEMIA, WITHOUT LONG-TERM CURRENT USE OF INSULIN: Primary | ICD-10-CM

## 2022-10-19 LAB
BILIRUB BLD-MCNC: NEGATIVE MG/DL
CLARITY, POC: CLEAR
COLOR UR: YELLOW
EXPIRATION DATE: NORMAL
GLUCOSE BLDC GLUCOMTR-MCNC: 143 MG/DL (ref 70–130)
GLUCOSE UR STRIP-MCNC: NEGATIVE MG/DL
HBA1C MFR BLD: 6.7 %
KETONES UR QL: NEGATIVE
LEUKOCYTE EST, POC: NEGATIVE
Lab: NORMAL
NITRITE UR-MCNC: NEGATIVE MG/ML
PH UR: 6 [PH] (ref 5–8)
POC CREATININE URINE: 0
POC MICROALBUMIN URINE: 50
PROT UR STRIP-MCNC: NEGATIVE MG/DL
RBC # UR STRIP: ABNORMAL /UL
SP GR UR: 1.02 (ref 1–1.03)
UROBILINOGEN UR QL: NORMAL

## 2022-10-19 PROCEDURE — 82044 UR ALBUMIN SEMIQUANTITATIVE: CPT | Performed by: FAMILY MEDICINE

## 2022-10-19 PROCEDURE — 81002 URINALYSIS NONAUTO W/O SCOPE: CPT | Performed by: FAMILY MEDICINE

## 2022-10-19 PROCEDURE — 99213 OFFICE O/P EST LOW 20 MIN: CPT | Performed by: FAMILY MEDICINE

## 2022-10-19 PROCEDURE — 83036 HEMOGLOBIN GLYCOSYLATED A1C: CPT | Performed by: FAMILY MEDICINE

## 2022-10-19 PROCEDURE — 82962 GLUCOSE BLOOD TEST: CPT | Performed by: FAMILY MEDICINE

## 2022-10-19 NOTE — ASSESSMENT & PLAN NOTE
Patient's (Body mass index is 40.11 kg/m².) indicates that they are morbidly obese (BMI > 40 or > 35 with obesity - related health condition) with health conditions that include hypertension, diabetes mellitus and dyslipidemias . Weight is unchanged. BMI is is above average; BMI management plan is completed. We discussed portion control and increasing exercise.

## 2022-10-21 LAB
BACTERIA UR CULT: NO GROWTH
BACTERIA UR CULT: NORMAL

## 2022-11-02 DIAGNOSIS — I10 ESSENTIAL HYPERTENSION: ICD-10-CM

## 2022-11-02 DIAGNOSIS — E78.5 DYSLIPIDEMIA: ICD-10-CM

## 2022-11-02 RX ORDER — CARVEDILOL 25 MG/1
TABLET ORAL
Qty: 60 TABLET | Refills: 0 | Status: SHIPPED | OUTPATIENT
Start: 2022-11-02 | End: 2022-11-14

## 2022-11-02 RX ORDER — SIMVASTATIN 40 MG
TABLET ORAL
Qty: 30 TABLET | Refills: 0 | Status: SHIPPED | OUTPATIENT
Start: 2022-11-02 | End: 2022-11-14

## 2022-11-12 DIAGNOSIS — I10 ESSENTIAL HYPERTENSION: ICD-10-CM

## 2022-11-12 DIAGNOSIS — E78.5 DYSLIPIDEMIA: ICD-10-CM

## 2022-11-14 RX ORDER — SIMVASTATIN 40 MG
TABLET ORAL
Qty: 30 TABLET | Refills: 0 | Status: SHIPPED | OUTPATIENT
Start: 2022-11-14 | End: 2022-12-09

## 2022-11-14 RX ORDER — CARVEDILOL 25 MG/1
TABLET ORAL
Qty: 60 TABLET | Refills: 0 | Status: SHIPPED | OUTPATIENT
Start: 2022-11-14 | End: 2022-12-09

## 2022-11-17 ENCOUNTER — TELEPHONE (OUTPATIENT)
Dept: FAMILY MEDICINE CLINIC | Facility: CLINIC | Age: 47
End: 2022-11-17

## 2022-11-17 NOTE — TELEPHONE ENCOUNTER
Caller: sAad Odom    Relationship: Self    Best call back number: [phone]    What medication are you requesting: ANTIBIOTIC    What are your current symptoms: PRODUCTIVE COUGH, FATIGUE, MUCUS, SINUS PRESSURE    How long have you been experiencing symptoms: ABOUT 3 DAYS    Have you had these symptoms before:    [x] Yes  [] No    Have you been treated for these symptoms before:   [x] Yes  [] No    If a prescription is needed, what is your preferred pharmacy and phone number: [facility] [address] [place] [address] [id number] [facility] [address]  - [phone] [facility] [phone] FX     Additional notes: PATIENT STATES HE GETS THIS YEARLY AND WANTS TO KNOW IF DR. CARMICHAEL CAN CALL IN A PRESCRIPTION FOR HIM.    PLEASE ADVISE

## 2022-11-18 ENCOUNTER — OFFICE VISIT (OUTPATIENT)
Dept: FAMILY MEDICINE CLINIC | Facility: CLINIC | Age: 47
End: 2022-11-18

## 2022-11-18 VITALS — HEIGHT: 73 IN | WEIGHT: 304 LBS | BODY MASS INDEX: 40.29 KG/M2

## 2022-11-18 DIAGNOSIS — F17.200 TOBACCO USE DISORDER: ICD-10-CM

## 2022-11-18 DIAGNOSIS — J06.9 UPPER RESPIRATORY TRACT INFECTION, UNSPECIFIED TYPE: Primary | ICD-10-CM

## 2022-11-18 DIAGNOSIS — E66.01 CLASS 3 SEVERE OBESITY DUE TO EXCESS CALORIES WITH SERIOUS COMORBIDITY AND BODY MASS INDEX (BMI) OF 40.0 TO 44.9 IN ADULT: ICD-10-CM

## 2022-11-18 PROCEDURE — 99443 PR PHYS/QHP TELEPHONE EVALUATION 21-30 MIN: CPT | Performed by: FAMILY MEDICINE

## 2022-11-18 RX ORDER — CEPHALEXIN 500 MG/1
500 CAPSULE ORAL 3 TIMES DAILY
Qty: 30 CAPSULE | Refills: 0 | Status: SHIPPED | OUTPATIENT
Start: 2022-11-18 | End: 2022-11-28

## 2022-11-18 RX ORDER — METHYLPREDNISOLONE 4 MG/1
TABLET ORAL
Qty: 21 TABLET | Refills: 0 | Status: SHIPPED | OUTPATIENT
Start: 2022-11-18

## 2022-11-18 RX ORDER — CEPHALEXIN 500 MG/1
500 CAPSULE ORAL 3 TIMES DAILY
COMMUNITY
Start: 2022-11-18 | End: 2022-11-18 | Stop reason: SDUPTHER

## 2022-11-18 RX ORDER — METHYLPREDNISOLONE 4 MG/1
TABLET ORAL 2 TIMES DAILY
COMMUNITY
End: 2022-11-18 | Stop reason: SDUPTHER

## 2022-11-18 RX ORDER — AZITHROMYCIN 250 MG/1
TABLET, FILM COATED ORAL
Qty: 6 TABLET | Refills: 0 | Status: CANCELLED | OUTPATIENT
Start: 2022-11-18

## 2022-11-18 NOTE — ASSESSMENT & PLAN NOTE
Patient's (Body mass index is 40.11 kg/m².) indicates that they are morbidly obese (BMI > 40 or > 35 with obesity - related health condition) with health conditions that include hypertension, diabetes mellitus, dyslipidemias and GERD . Weight is unchanged. BMI is is above average; BMI management plan is completed. We discussed portion control and increasing exercise.

## 2022-11-18 NOTE — PROGRESS NOTES
Subjective   Asad Odom is a 47 y.o. male.     Chief Complaint   Patient presents with   • Cough       URI   This is a new problem. Episode onset: monday 11/14/2022. The problem has been gradually improving. There has been no fever. Associated symptoms include congestion, coughing, ear pain and headaches. Pertinent negatives include no abdominal pain, chest pain or nausea. He has tried acetaminophen for the symptoms. The treatment provided no relief.            I personally reviewed and updated the patient's allergies, medications, problem list, and past medical, surgical, social, and family history. I have reviewed and confirmed the accuracy of the History of Present Illness and Review of Symptoms as documented by the MA/LPN/RN. Dee Persaud MA    Family History   Problem Relation Age of Onset   • Other Mother         suicide   • Other Sister         Suicide sibilingd       Social History     Tobacco Use   • Smoking status: Some Days     Packs/day: 0.25     Years: 31.00     Pack years: 7.75     Types: Cigarettes     Start date: 1/1/1991     Passive exposure: Never   • Smokeless tobacco: Former     Types: Chew     Quit date: 7/16/2019   • Tobacco comments:     off and on    Vaping Use   • Vaping Use: Never used   Substance Use Topics   • Alcohol use: Yes     Comment: occasional   • Drug use: No       Past Surgical History:   Procedure Laterality Date   • BONE BIOPSY     • BONE MARROW BIOPSY     • CYSTOSCOPY BLADDER STONE LITHOTRIPSY     • KNEE MENISCAL REPAIR Left    • MANDIBLE FRACTURE SURGERY         Patient Active Problem List   Diagnosis   • Asthma   • Dyslipidemia   • Elevated serum tryptase   • Essential hypertension   • Mast cell disorder   • Nephrolithiasis   • Anxiety   • GERD (gastroesophageal reflux disease)   • Osteoarthritis   • Carotid artery stenosis   • AK (actinic keratosis)   • Annual visit for general adult medical examination with abnormal findings   • Tobacco use disorder   • Class 3  "severe obesity due to excess calories with serious comorbidity and body mass index (BMI) of 40.0 to 44.9 in adult (HCC)   • Arthritis   • Elevated liver function tests   • Acute right-sided low back pain without sciatica   • Encounter for CDL (commercial driving license) exam   • Hemorrhoids   • Hypercholesterolemia   • Elevated fasting blood sugar   • Type 2 diabetes mellitus with hyperglycemia, without long-term current use of insulin (HCC)   • Generalized abdominal pain   • Hordeolum internum of left upper eyelid   • Myopia of left eye         Current Outpatient Medications:   •  aspirin 81 MG tablet, Take 81 mg by mouth., Disp: , Rfl:   •  carvedilol (COREG) 25 MG tablet, TAKE 1 TABLET BY MOUTH TWICE DAILY WITH MEALS, Disp: 60 tablet, Rfl: 0  •  cetirizine (zyrTEC) 5 MG tablet, Take 5 mg by mouth., Disp: , Rfl:   •  fenofibrate (TRICOR) 145 MG tablet, Take 1 tablet by mouth Daily., Disp: 90 tablet, Rfl: 3  •  raNITIdine (ZANTAC) 150 MG tablet, TAKE 1 TABLET BY MOUTH TWICE DAILY, Disp: , Rfl:   •  simvastatin (ZOCOR) 40 MG tablet, Take 1 tablet by mouth nightly, Disp: 30 tablet, Rfl: 0         Review of Systems   Constitutional: Negative for chills and diaphoresis.   HENT: Positive for congestion and ear pain.    Eyes: Negative for visual disturbance.   Respiratory: Positive for cough. Negative for shortness of breath.    Cardiovascular: Negative for chest pain and palpitations.   Gastrointestinal: Negative for abdominal pain and nausea.   Endocrine: Negative for polydipsia and polyphagia.   Musculoskeletal: Negative for neck stiffness.   Skin: Negative for color change and pallor.   Neurological: Negative for seizures and syncope.   Hematological: Negative for adenopathy.   Psychiatric/Behavioral: Negative for hallucinations and suicidal ideas.       I have reviewed and confirmed the accuracy of the ROS as documented by the MA/LPN/RN Dee Persaud MA      Objective   Ht 185.4 cm (73\")   Wt (!) 138 kg (304 lb) "   BMI 40.11 kg/m²   BP Readings from Last 3 Encounters:   10/19/22 132/82   04/20/22 138/78   04/20/22 138/78     Wt Readings from Last 3 Encounters:   11/18/22 (!) 138 kg (304 lb)   10/19/22 (!) 138 kg (304 lb)   04/20/22 133 kg (293 lb)     Physical Exam    Data / Lab Results:    Hemoglobin A1C   Date Value Ref Range Status   10/19/2022 6.7 % Final   02/16/2022 6.0 % Final   04/26/2021 6.0 % Final        Lab Results   Component Value Date    LDL 69 04/20/2022     (H) 10/20/2020    LDL Comment 10/16/2019     Lab Results   Component Value Date    CHOL 223 (H) 09/25/2018    CHOL 211 (H) 12/11/2017    CHOL 197 09/24/2016     Lab Results   Component Value Date    TRIG 341 (H) 04/20/2022    TRIG 626 (H) 10/20/2020    TRIG 541 (H) 10/16/2019     Lab Results   Component Value Date    HDL 34 (L) 04/20/2022    HDL 34 (L) 10/20/2020    HDL 31 (L) 10/16/2019     No results found for: PSA  Lab Results   Component Value Date    WBC 6.0 04/20/2022    HGB 15.8 04/20/2022    HCT 46.4 04/20/2022    MCV 89 04/20/2022     04/20/2022     Lab Results   Component Value Date    TSH 1.340 04/20/2022      Lab Results   Component Value Date    GLUCOSE 142 (H) 04/20/2022    BUN 10 04/20/2022    CREATININE 0.96 04/20/2022    EGFRIFNONA 106 10/20/2020    EGFRIFAFRI 123 10/20/2020    BCR 10 04/20/2022    K 4.7 04/20/2022    CO2 27 04/20/2022    CALCIUM 9.2 04/20/2022    PROTENTOTREF 6.7 04/20/2022    ALBUMIN 4.5 04/20/2022    LABIL2 2.0 04/20/2022    AST 30 04/20/2022    ALT 38 04/20/2022     Lab Results   Component Value Date    GENO Negative 10/16/2019      No results found for: CRP   No results found for: IRON, TIBC, FERRITIN   Lab Results   Component Value Date    FXJHGEUR05 504 12/11/2017        Asad Odom consented to undergoing telephone visit today.  This format was necessitated by the current COVID-19 viral pandemic.  During the encounter I was located in the office, Asad Odom was located in their personal  residence.  23 minutes spent from today with Asad discussing his acute concerns and chronic medical problems.      Assessment & Plan      Medications        Problem List         LOS    Acute bacterial bronchitis.  Start antibiotics.  Increase fluid intake.  Call if return if fever worsening symptoms  Type 2 diabetes.  New onset.  Discussed diet, exercise, lifestyle modification.    Stable today, A1c 6.7, diet controlled. Monitor blood sugars.  Consider nutritionist referral.  Follow-up 3 month.   Consider add lisinopril, plan ophthalmology referral.  Groin sprain.  Benign exam today.  Ice, rehabilitation exercise discussed.  Start prednisone.  Call return if worsening symptoms.  Health maintenance.  Doing well.  Recommend update tetanus shot at [employer].  Flu vaccine declined.  Discussed health maintenance, screening tests, lifestyle modification.  Nephrolithiasis.  Improved/resolved currently. 4 mm left distal ureter per CT 4/20, multiple other stones bilateral kidneys 4 to 5 mm.  Increase fluid intake.    Knee pain.  Left, patellar tendinitis.  OA contributing.  IM steroids given.  Ice, NSAIDs, rehabilitation exercises discussed.  Call return if persistent symptoms.  Gallstone.  Small stone incidental finding per CT 4/20.  Asymptomatic.  Consider surgery referral if symptoms develop.  Hyperlipidemia.  Has been off simvastatin, restart.  Familial with significant elevated triglycerides.  Improved on statin.  Follow-up recheck.  Fatty liver.  With mild elevation LFTs.  Continue fish oil.  Continue to monitor.  Mast cell disorder.  Has been followed by Dr. Medina in the past.  Recommend hematology follow-up, offered second opinion from alternative hematologist he declines currently.  Clinically stable on Claritin/ranitidine.  Lumbar disc disease.  Undergoing PT currently, considering epidural injections.  Followed by [employer].  Asthma.  Flare currently start prednisone.  Improved on pulmonary toilet.   Infrequent baseline inhaler use currently.  Hypertension.  Elevated today secondary did not take his carvedilol today.  Overall good control on carvedilol.  Cardiolite stress testing benign 2012.  Carotid Dopplers normal 2015.  Discussed low-sodium diet.  Follow-up recheck.  Anxiety.  Recommend Rx he declines.  Family history of prostate cancer.  His brother.  He believes he has been diagnosed with prostate cancer but he is not sure if it is prostate or colon cancer.  Check PSA.  he will check with his brother/plan proceed to colonoscopy if he has developed colon cancer.  Follow-up visit for comprehensive physical and screening test plannef.           Problem List Items Addressed This Visit        Endocrine and Metabolic    Class 3 severe obesity due to excess calories with serious comorbidity and body mass index (BMI) of 40.0 to 44.9 in adult (HCC)    Current Assessment & Plan     Patient's (Body mass index is 40.11 kg/m².) indicates that they are morbidly obese (BMI > 40 or > 35 with obesity - related health condition) with health conditions that include hypertension, diabetes mellitus, dyslipidemias and GERD . Weight is unchanged. BMI is is above average; BMI management plan is completed. We discussed portion control and increasing exercise.             Tobacco    Tobacco use disorder   Other Visit Diagnoses     Upper respiratory tract infection, unspecified type    -  Primary              Expected course, medications, and adverse effects discussed.  Call or return if worsening or persistent symptoms.  The complete contents of the Assessment and Plan and Data / Lab Results as documented above have been reviewed and addressed by myself with the patient today as part of an ongoing evaluation / treatment plan.  If some of the documentation has been copied from a previous note and is unchanged it indicates that this problem / plan has been assessed today but is stable from a previous visit and no changes have been  recommended.

## 2022-12-09 DIAGNOSIS — I10 ESSENTIAL HYPERTENSION: ICD-10-CM

## 2022-12-09 DIAGNOSIS — E78.5 DYSLIPIDEMIA: ICD-10-CM

## 2022-12-09 RX ORDER — SIMVASTATIN 40 MG
TABLET ORAL
Qty: 30 TABLET | Refills: 0 | Status: SHIPPED | OUTPATIENT
Start: 2022-12-09 | End: 2023-01-09

## 2022-12-09 RX ORDER — CARVEDILOL 25 MG/1
TABLET ORAL
Qty: 60 TABLET | Refills: 0 | Status: SHIPPED | OUTPATIENT
Start: 2022-12-09 | End: 2023-01-09

## 2023-01-08 DIAGNOSIS — I10 ESSENTIAL HYPERTENSION: ICD-10-CM

## 2023-01-08 DIAGNOSIS — E78.5 DYSLIPIDEMIA: ICD-10-CM

## 2023-01-09 RX ORDER — CARVEDILOL 25 MG/1
TABLET ORAL
Qty: 60 TABLET | Refills: 0 | Status: SHIPPED | OUTPATIENT
Start: 2023-01-09 | End: 2023-02-13

## 2023-01-09 RX ORDER — SIMVASTATIN 40 MG
TABLET ORAL
Qty: 30 TABLET | Refills: 0 | Status: SHIPPED | OUTPATIENT
Start: 2023-01-09 | End: 2023-02-13

## 2023-02-07 DIAGNOSIS — E78.5 DYSLIPIDEMIA: ICD-10-CM

## 2023-02-07 DIAGNOSIS — I10 ESSENTIAL HYPERTENSION: ICD-10-CM

## 2023-02-13 RX ORDER — SIMVASTATIN 40 MG
TABLET ORAL
Qty: 30 TABLET | Refills: 2 | Status: SHIPPED | OUTPATIENT
Start: 2023-02-13

## 2023-02-13 RX ORDER — CARVEDILOL 25 MG/1
TABLET ORAL
Qty: 60 TABLET | Refills: 2 | Status: SHIPPED | OUTPATIENT
Start: 2023-02-13

## 2023-04-17 NOTE — PROGRESS NOTES
Medical Examination      Subjective     Asad Odom is a 47 y.o. male who presents today for a  fitness determination physical exam. The patient reports no problems.  The following portions of the patient's history were reviewed and updated as appropriate: allergies, current medications, past family history, past medical history, past social history, past surgical history and problem list.    I personally reviewed and updated the patient's allergies, medications, problem list, and past medical, surgical, social, and family history. I have reviewed and confirmed the accuracy of the History of Present Illness and Review of Symptoms as documented by the MA/LPN/RN. Pablo Noyola MD        Family History   Problem Relation Age of Onset   • Other Mother         suicide   • Other Sister         Suicide sibilingd       Social History     Tobacco Use   • Smoking status: Some Days     Packs/day: 0.25     Years: 31.00     Pack years: 7.75     Types: Cigarettes     Start date: 1/1/1991     Passive exposure: Never   • Smokeless tobacco: Former     Types: Chew     Quit date: 7/16/2019   • Tobacco comments:     off and on    Vaping Use   • Vaping Use: Never used   Substance Use Topics   • Alcohol use: Yes     Comment: occasional   • Drug use: No       Past Surgical History:   Procedure Laterality Date   • BONE BIOPSY     • BONE MARROW BIOPSY     • CYSTOSCOPY BLADDER STONE LITHOTRIPSY     • KNEE MENISCAL REPAIR Left    • MANDIBLE FRACTURE SURGERY         Patient Active Problem List   Diagnosis   • Asthma   • Dyslipidemia   • Elevated serum tryptase   • Essential hypertension   • Mast cell disorder   • Nephrolithiasis   • Anxiety   • GERD (gastroesophageal reflux disease)   • Osteoarthritis   • Carotid artery stenosis   • AK (actinic keratosis)   • Annual visit for general adult medical examination with abnormal findings   • Tobacco use disorder   • Class 3 severe obesity due to excess calories  "with serious comorbidity and body mass index (BMI) of 40.0 to 44.9 in adult (HCC)   • Arthritis   • Elevated liver function tests   • Acute right-sided low back pain without sciatica   • Encounter for CDL (commercial driving license) exam   • Hemorrhoids   • Hypercholesterolemia   • Elevated fasting blood sugar   • Type 2 diabetes mellitus with hyperglycemia, without long-term current use of insulin (HCC)   • Generalized abdominal pain   • Hordeolum internum of left upper eyelid   • Myopia of left eye         Current Outpatient Medications:   •  aspirin 81 MG tablet, Take 1 tablet by mouth., Disp: , Rfl:   •  carvedilol (COREG) 25 MG tablet, TAKE 1 TABLET BY MOUTH TWICE DAILY WITH MEALS, Disp: 60 tablet, Rfl: 2  •  cetirizine (zyrTEC) 5 MG tablet, Take 1 tablet by mouth., Disp: , Rfl:   •  simvastatin (ZOCOR) 40 MG tablet, Take 1 tablet by mouth nightly, Disp: 30 tablet, Rfl: 2  •  fenofibrate (TRICOR) 145 MG tablet, Take 1 tablet by mouth once daily, Disp: 90 tablet, Rfl: 0         Review of Systems   Constitutional: Negative for chills and diaphoresis.   Eyes: Negative for visual disturbance.   Respiratory: Negative for shortness of breath.    Cardiovascular: Negative for chest pain and palpitations.   Gastrointestinal: Negative for abdominal pain and nausea.   Endocrine: Negative for polydipsia and polyphagia.   Musculoskeletal: Negative for neck stiffness.   Skin: Negative for color change and pallor.   Neurological: Negative for seizures and syncope.   Hematological: Negative for adenopathy.   Psychiatric/Behavioral: Negative for hallucinations and suicidal ideas.       I have reviewed and confirmed the accuracy of the ROS as documented by the MA/LPN/RN Pablo Noyola MD      Objective   /86   Pulse 74   Temp 98.7 °F (37.1 °C)   Resp 18   Ht 185.4 cm (73\")   Wt 133 kg (293 lb)   SpO2 97%   BMI 38.66 kg/m²   Wt Readings from Last 3 Encounters:   04/18/23 133 kg (293 lb)   11/18/22 (!) 138 kg (304 " lb)   10/19/22 (!) 138 kg (304 lb)       Vision:   Uncorrected Corrected Horizontal Field of Vision   Right Eye   20/20 >85 degrees   Left Eye    20/20 >85 degrees   Both Eyes    20/20      Applicant can recognize and distinguish among traffic control signals and devices showing standard red, green, and lynette colors.    Applicant meets visual acuity requirement only when wearing corrective lenses.    Monocular Vision?: No    Physical Exam  Constitutional:       Appearance: He is well-developed. He is not diaphoretic.   HENT:      Head: Normocephalic.      Right Ear: Hearing, tympanic membrane, ear canal and external ear normal.      Left Ear: Hearing, tympanic membrane, ear canal and external ear normal.      Nose: Nose normal. No mucosal edema or congestion.      Right Sinus: No maxillary sinus tenderness or frontal sinus tenderness.      Left Sinus: No maxillary sinus tenderness or frontal sinus tenderness.      Mouth/Throat:      Mouth: No oral lesions.      Pharynx: Uvula midline. No oropharyngeal exudate or posterior oropharyngeal erythema.      Tonsils: No tonsillar exudate.   Eyes:      General: Lids are normal.      Conjunctiva/sclera: Conjunctivae normal.      Pupils: Pupils are equal, round, and reactive to light.   Neck:      Thyroid: No thyromegaly.      Vascular: No carotid bruit or JVD.      Trachea: No tracheal deviation.   Cardiovascular:      Rate and Rhythm: Normal rate and regular rhythm.      Heart sounds: Normal heart sounds. No murmur heard.    No friction rub. No gallop.   Pulmonary:      Effort: Pulmonary effort is normal.      Breath sounds: Normal breath sounds. No stridor. No decreased breath sounds, wheezing or rales.   Abdominal:      General: Bowel sounds are normal. There is no distension.      Palpations: Abdomen is soft. There is no mass.      Tenderness: There is no abdominal tenderness. There is no guarding or rebound.      Hernia: No hernia is present. There is no hernia in the  left inguinal area.   Genitourinary:     Penis: Normal.       Testes: Normal.         Right: Mass, tenderness or swelling not present.         Left: Mass, tenderness or swelling not present.   Lymphadenopathy:      Head:      Right side of head: No submental, submandibular, tonsillar, preauricular, posterior auricular or occipital adenopathy.      Left side of head: No submental, submandibular, tonsillar, preauricular, posterior auricular or occipital adenopathy.      Cervical: No cervical adenopathy.      Lower Body: No right inguinal adenopathy. No left inguinal adenopathy.   Skin:     General: Skin is warm and dry.      Coloration: Skin is not pale.   Neurological:      Mental Status: He is alert and oriented to person, place, and time.      Cranial Nerves: No cranial nerve deficit.      Sensory: No sensory deficit.      Motor: No tremor, abnormal muscle tone or seizure activity.      Coordination: Coordination normal.      Gait: Gait normal.      Deep Tendon Reflexes: Reflexes are normal and symmetric.           Assessment & Plan      Medications        Problem List         LOS  Acute bacterial bronchitis.  Start antibiotics.  Increase fluid intake.  Call if return if fever worsening symptoms  Type 2 diabetes.  New onset.  Discussed diet, exercise, lifestyle modification.    Stable today, A1c 6.6, diet controlled. Monitor blood sugars.  Consider nutritionist referral.  Follow-up 3 month.   Consider add lisinopril, plan ophthalmology referral.  Groin sprain.  Benign exam today.  Ice, rehabilitation exercise discussed.  Start prednisone.  Call return if worsening symptoms.  Health maintenance.  Doing well.  Recommend update tetanus shot at [employer].  Flu vaccine declined.  Discussed health maintenance, screening tests, lifestyle modification.  Nephrolithiasis.  Improved/resolved currently. 4 mm left distal ureter per CT 4/20, multiple other stones bilateral kidneys 4 to 5 mm.  Increase fluid intake.    Knee pain.   Left, patellar tendinitis.  OA contributing.  IM steroids given.  Ice, NSAIDs, rehabilitation exercises discussed.  Call return if persistent symptoms.  Gallstone.  Small stone incidental finding per CT 4/20.  Asymptomatic.  Consider surgery referral if symptoms develop.  Hyperlipidemia.  Has been off simvastatin, restart.  Familial with significant elevated triglycerides.  Improved on statin.  Follow-up recheck.  Fatty liver.  With mild elevation LFTs.  Continue fish oil.  Continue to monitor.  Mast cell disorder.  Has been followed by Dr. Medina in the past.  Recommend hematology follow-up, offered second opinion from alternative hematologist he declines currently.  Clinically stable on Claritin/ranitidine.  Lumbar disc disease.  Undergoing PT currently, considering epidural injections.  Followed by [employer].  Asthma.  Flare currently start prednisone.  Improved on pulmonary toilet.  Infrequent baseline inhaler use currently.  Hypertension.  Elevated today secondary did not take his carvedilol today.  Overall good control on carvedilol.  Cardiolite stress testing benign 2012.  Carotid Dopplers normal 2015.  Discussed low-sodium diet.  Follow-up recheck.  Anxiety.  Recommend Rx he declines.  Family history of prostate cancer.  His brother.  He believes he has been diagnosed with prostate cancer but he is not sure if it is prostate or colon cancer.  Check PSA.  he will check with his brother/plan proceed to colonoscopy if he has developed colon cancer.  Follow-up visit for comprehensive physical and screening test plannef.  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  Take carvedilol regularly.  Type 2 diabetes well controlled with diet.  Follow-up recheck 3 months.        Diagnoses and all orders for this visit:    1. 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 Tobacco use  disorder    3. Class 3 severe obesity due to excess calories with serious comorbidity and body mass index (BMI) of 40.0 to 44.9 in adult ([facility])  Assessment & Plan:  Patient's (Body mass index is 38.66 kg/m².) indicates that they are obese (BMI >30) with health conditions that include hypertension and dyslipidemias . Weight is unchanged. BMI  is above average; BMI management plan is completed. We discussed portion control and increasing exercise.       4. Type 2 diabetes mellitus with hyperglycemia, without long-term current use of insulin ([facility])  -     POC Glycosylated Hemoglobin (Hb A1C)            Expected course, medications, and adverse effects discussed.  Call or return if worsening or persistent symptoms.  I wore protective equipment throughout this patient encounter including a mask, gloves, and eye protection.  Hand hygiene was performed before donning protective equipment and after removal when leaving the room.  The complete contents of the assessment and plan and lab results as documented above have been reviewed and addressed by myself with the patient today as part of an ongoing evaluation / treatment plan.  If some of the documentation has been copied from a previous note and is unchanged it indicates that this problem / plan has been assessed today but is stable from a previous visit and no changes have been recommended.

## 2023-04-18 ENCOUNTER — CLINICAL SUPPORT (OUTPATIENT)
Dept: FAMILY MEDICINE CLINIC | Facility: CLINIC | Age: 48
End: 2023-04-18

## 2023-04-18 VITALS
HEART RATE: 74 BPM | SYSTOLIC BLOOD PRESSURE: 144 MMHG | OXYGEN SATURATION: 97 % | RESPIRATION RATE: 18 BRPM | BODY MASS INDEX: 38.83 KG/M2 | HEIGHT: 73 IN | TEMPERATURE: 98.7 F | DIASTOLIC BLOOD PRESSURE: 86 MMHG | WEIGHT: 293 LBS

## 2023-04-18 DIAGNOSIS — E66.01 CLASS 3 SEVERE OBESITY DUE TO EXCESS CALORIES WITH SERIOUS COMORBIDITY AND BODY MASS INDEX (BMI) OF 40.0 TO 44.9 IN ADULT: ICD-10-CM

## 2023-04-18 DIAGNOSIS — E11.65 TYPE 2 DIABETES MELLITUS WITH HYPERGLYCEMIA, WITHOUT LONG-TERM CURRENT USE OF INSULIN: ICD-10-CM

## 2023-04-18 DIAGNOSIS — F17.200 TOBACCO USE DISORDER: ICD-10-CM

## 2023-04-18 DIAGNOSIS — Z02.4 ENCOUNTER FOR CDL (COMMERCIAL DRIVING LICENSE) EXAM: Primary | ICD-10-CM

## 2023-04-18 LAB
BILIRUB BLD-MCNC: NEGATIVE MG/DL
CLARITY, POC: CLEAR
COLOR UR: YELLOW
EXPIRATION DATE: NORMAL
GLUCOSE UR STRIP-MCNC: NEGATIVE MG/DL
HBA1C MFR BLD: 6.6 %
KETONES UR QL: NEGATIVE
LEUKOCYTE EST, POC: NEGATIVE
Lab: NORMAL
NITRITE UR-MCNC: NEGATIVE MG/ML
PH UR: 5 [PH] (ref 5–8)
PROT UR STRIP-MCNC: NEGATIVE MG/DL
RBC # UR STRIP: NEGATIVE /UL
SP GR UR: 1.02 (ref 1–1.03)
UROBILINOGEN UR QL: NORMAL

## 2023-04-18 NOTE — ASSESSMENT & PLAN NOTE
Patient's (Body mass index is 38.66 kg/m².) indicates that they are obese (BMI >30) with health conditions that include hypertension and dyslipidemias . Weight is unchanged. BMI  is above average; BMI management plan is completed. We discussed portion control and increasing exercise.

## 2023-05-02 DIAGNOSIS — E78.00 HYPERCHOLESTEROLEMIA: ICD-10-CM

## 2023-05-02 RX ORDER — FENOFIBRATE 145 MG/1
TABLET, COATED ORAL
Qty: 90 TABLET | Refills: 0 | Status: SHIPPED | OUTPATIENT
Start: 2023-05-02

## 2023-07-17 NOTE — PROGRESS NOTES
Subjective   Asad Odom is a 47 y.o. male.     Chief Complaint   Patient presents with    Diabetes    Hypertension    Hyperlipidemia       The patient is here: for coordination of medical care to discuss health maintenance and disease prevention to follow up on multiple medical problems.  Last comprehensive physical was on 4/20/2022.  Previous physical was performed by  Pablo Noyola MD  Overall has: minimal activity with work/home activities, good appetite, feels well with minor complaints, good energy level, and is sleeping well. Nutrition: appropriate balanced diet and eating a variety of foods. Last tetanus shot was unknown. Patient's last carotid doppler was:  9/11/2015  Patient's last stress test was:  4/29/2012  Patient's last PSA was:  Last Completed Colonoscopy       This patient has no relevant Health Maintenance data.            Hypertension  This is a chronic problem. The current episode started more than 1 year ago. The problem is unchanged. The problem is uncontrolled. Pertinent negatives include no anxiety, blurred vision, chest pain, headaches, malaise/fatigue, neck pain, orthopnea, palpitations, peripheral edema, PND, shortness of breath or sweats. (Patient is feeling well. 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 ) Risk factors for coronary artery disease include dyslipidemia, male gender, obesity and smoking/tobacco exposure. Current antihypertension treatment includes beta blockers. The current treatment provides moderate improvement. There are no compliance problems.  There is no history of angina, kidney disease, CAD/MI, CVA, heart failure, left ventricular hypertrophy, PVD or retinopathy. There is no history of chronic renal disease, coarctation of the aorta, hyperaldosteronism, hypercortisolism, hyperparathyroidism, a hypertension causing med, pheochromocytoma, renovascular disease, sleep apnea or a thyroid problem.   Hyperlipidemia  This is a  chronic problem. The current episode started more than 1 year ago. Recent lipid tests were reviewed and are high. Exacerbating diseases include diabetes and obesity. He has no history of chronic renal disease, hypothyroidism, liver disease or nephrotic syndrome. Pertinent negatives include no chest pain, focal sensory loss, focal weakness, leg pain, myalgias or shortness of breath. Current antihyperlipidemic treatment includes statins. There are no compliance problems.  Risk factors for coronary artery disease include dyslipidemia, hypertension, male sex and obesity.   Diabetes  He presents for his follow-up diabetic visit. Pertinent negatives for hypoglycemia include no confusion, dizziness, headaches, hunger, mood changes, nervousness/anxiousness, pallor, seizures, sleepiness, speech difficulty, sweats or tremors. Pertinent negatives for diabetes include no blurred vision, no chest pain, no fatigue, no foot paresthesias, no foot ulcerations, no polydipsia, no polyphagia, no polyuria, no visual change, no weakness and no weight loss. There are no hypoglycemic complications. Pertinent negatives for hypoglycemia complications include no blackouts, no hospitalization, no nocturnal hypoglycemia, no required assistance and no required glucagon injection. Symptoms are stable. There are no diabetic complications. Pertinent negatives for diabetic complications include no autonomic neuropathy, CVA, heart disease, impotence, nephropathy, peripheral neuropathy, PVD or retinopathy. Risk factors for coronary artery disease include family history, male sex, obesity and stress. When asked about current treatments, none were reported. He is compliant with treatment most of the time. His weight is stable. He is following a generally healthy diet. He has not had a previous visit with a dietitian. He participates in exercise daily. (Patient does not regularly check sugar at home) An ACE inhibitor/angiotensin II receptor blocker is  being taken. He does not see a podiatrist.Eye exam is not current.      Recent Hospitalizations:  No hospitalization(s) within the last year..  ccc      I personally reviewed and updated the patient's allergies, medications, problem list, and past medical, surgical, social, and family history. I have reviewed and confirmed the accuracy of the HPI and ROS as documented by the MA/LPN/RN Pablo Noyola MD    Family History   Problem Relation Age of Onset    Other Mother         suicide    Other Sister         Suicide sibilingd       Social History     Tobacco Use    Smoking status: Some Days     Packs/day: 0.25     Years: 31.00     Pack years: 7.75     Types: Cigarettes     Start date: 1/1/1991     Passive exposure: Never    Smokeless tobacco: Former     Types: Chew     Quit date: 7/16/2019    Tobacco comments:     off and on    Vaping Use    Vaping Use: Never used   Substance Use Topics    Alcohol use: Yes     Comment: occasional    Drug use: No       Past Surgical History:   Procedure Laterality Date    BONE BIOPSY      BONE MARROW BIOPSY      CYSTOSCOPY BLADDER STONE LITHOTRIPSY      KNEE MENISCAL REPAIR Left     MANDIBLE FRACTURE SURGERY         Patient Active Problem List   Diagnosis    Asthma    Dyslipidemia    Elevated serum tryptase    Essential hypertension    Mast cell disorder    Nephrolithiasis    Anxiety    GERD (gastroesophageal reflux disease)    Osteoarthritis    Carotid artery stenosis    AK (actinic keratosis)    Annual visit for general adult medical examination with abnormal findings    Tobacco use disorder    Class 2 severe obesity due to excess calories with serious comorbidity and body mass index (BMI) of 38.0 to 38.9 in adult    Arthritis    Elevated liver function tests    Acute right-sided low back pain without sciatica    Encounter for CDL (commercial driving license) exam    Hemorrhoids    Hypercholesterolemia    Elevated fasting blood sugar    Type 2 diabetes mellitus with hyperglycemia,  without long-term current use of insulin    Generalized abdominal pain    Hordeolum internum of left upper eyelid    Myopia of left eye         Current Outpatient Medications:     aspirin 81 MG tablet, Take 1 tablet by mouth., Disp: , Rfl:     cetirizine (zyrTEC) 5 MG tablet, Take 1 tablet by mouth., Disp: , Rfl:     carvedilol (COREG) 25 MG tablet, TAKE 1 TABLET BY MOUTH TWICE DAILY WITH MEALS, Disp: 60 tablet, Rfl: 0    fenofibrate (TRICOR) 145 MG tablet, Take 1 tablet by mouth once daily, Disp: 90 tablet, Rfl: 0    lisinopril (PRINIVIL,ZESTRIL) 20 MG tablet, Take 1 tablet by mouth Daily., Disp: 90 tablet, Rfl: 3    simvastatin (ZOCOR) 40 MG tablet, Take 1 tablet by mouth nightly, Disp: 30 tablet, Rfl: 0    Review of Systems   Constitutional:  Negative for chills, diaphoresis, fatigue, malaise/fatigue and unexpected weight loss.   HENT:  Negative for trouble swallowing and voice change.    Eyes:  Negative for blurred vision and visual disturbance.   Respiratory:  Negative for shortness of breath.    Cardiovascular:  Negative for chest pain, palpitations, orthopnea and PND.   Gastrointestinal:  Negative for abdominal pain and nausea.   Endocrine: Negative for polydipsia, polyphagia and polyuria.   Genitourinary:  Negative for hematuria and impotence.   Musculoskeletal:  Negative for myalgias, neck pain and neck stiffness.   Skin:  Negative for color change and pallor.   Allergic/Immunologic: Negative for immunocompromised state.   Neurological:  Negative for dizziness, tremors, focal weakness, seizures, syncope, speech difficulty, weakness and confusion.   Hematological:  Negative for adenopathy.   Psychiatric/Behavioral:  Negative for hallucinations, sleep disturbance and suicidal ideas. The patient is not nervous/anxious.      I have reviewed and confirmed the accuracy of the ROS as documented by the MA/LPN/RN Pablo Noyola MD      Objective   BP (!) 178/112 (BP Location: Right arm, Patient Position: Sitting,  "Cuff Size: Large Adult)   Pulse 78   Resp 20   Ht 185.4 cm (72.99\")   Wt (!) 137 kg (302 lb)   SpO2 98%   BMI 39.85 kg/mý   BP Readings from Last 3 Encounters:   08/02/23 (!) 158/102   07/18/23 (!) 178/112   04/18/23 144/86     Wt Readings from Last 3 Encounters:   08/02/23 132 kg (290 lb)   07/18/23 (!) 137 kg (302 lb)   04/18/23 133 kg (293 lb)     Physical Exam  Constitutional:       Appearance: Normal appearance. He is well-developed. He is not diaphoretic.   HENT:      Right Ear: Hearing, tympanic membrane, ear canal and external ear normal.      Left Ear: Hearing, tympanic membrane, ear canal and external ear normal.      Nose: Nose normal. No mucosal edema or congestion.      Right Sinus: No maxillary sinus tenderness or frontal sinus tenderness.      Left Sinus: No maxillary sinus tenderness or frontal sinus tenderness.      Mouth/Throat:      Mouth: No oral lesions.      Pharynx: Uvula midline. No oropharyngeal exudate or posterior oropharyngeal erythema.      Tonsils: No tonsillar exudate.   Eyes:      General: Lids are normal. No scleral icterus.        Right eye: No foreign body or discharge.         Left eye: No foreign body or discharge.      Extraocular Movements:      Right eye: No nystagmus.      Left eye: No nystagmus.      Conjunctiva/sclera: Conjunctivae normal.      Right eye: Right conjunctiva is not injected. No exudate or hemorrhage.     Left eye: Left conjunctiva is not injected. No exudate or hemorrhage.     Pupils: Pupils are equal, round, and reactive to light.      Funduscopic exam:     Right eye: No hemorrhage, exudate, AV nicking, arteriolar narrowing or papilledema.         Left eye: No hemorrhage, exudate, AV nicking, arteriolar narrowing or papilledema.   Cardiovascular:      Rate and Rhythm: Normal rate and regular rhythm.      Pulses: Normal pulses.      Heart sounds: Normal heart sounds, S1 normal and S2 normal. No murmur heard.    No friction rub. No gallop.   Pulmonary: "      Effort: Pulmonary effort is normal. No accessory muscle usage.      Breath sounds: Normal breath sounds. No stridor. No decreased breath sounds, wheezing, rhonchi or rales.   Abdominal:      General: Bowel sounds are normal. There is no distension.      Palpations: Abdomen is soft. Abdomen is not rigid. There is no mass or pulsatile mass.      Tenderness: There is no abdominal tenderness. There is no guarding or rebound. Negative signs include Randolph's sign.      Hernia: No hernia is present.   Skin:     General: Skin is warm and dry.      Coloration: Skin is not pale.   Neurological:      Mental Status: He is alert and oriented to person, place, and time.      Coordination: Coordination normal.      Gait: Gait normal.       Data / Lab Results:    Hemoglobin A1C   Date Value Ref Range Status   07/18/2023 6.4 % Final   04/18/2023 6.6 % Final   10/19/2022 6.7 % Final        Lab Results   Component Value Date    LDL 76 07/25/2023    LDL 69 04/20/2022     (H) 10/20/2020     Lab Results   Component Value Date    CHOL 223 (H) 09/25/2018    CHOL 211 (H) 12/11/2017    CHOL 197 09/24/2016     Lab Results   Component Value Date    TRIG 121 07/25/2023    TRIG 341 (H) 04/20/2022    TRIG 626 (H) 10/20/2020     Lab Results   Component Value Date    HDL 44 07/25/2023    HDL 34 (L) 04/20/2022    HDL 34 (L) 10/20/2020     No results found for: PSA  Lab Results   Component Value Date    WBC 5.7 07/25/2023    HGB 15.3 07/25/2023    HCT 45.2 07/25/2023    MCV 90 07/25/2023     07/25/2023     Lab Results   Component Value Date    TSH 1.340 04/20/2022     Lab Results   Component Value Date    GLUCOSE 142 (H) 04/20/2022    BUN 10 04/20/2022    CREATININE 0.96 04/20/2022    EGFRIFNONA 106 10/20/2020    EGFRIFAFRI 123 10/20/2020    BCR 10 04/20/2022    K 4.7 04/20/2022    CO2 27 04/20/2022    CALCIUM 9.2 04/20/2022    PROTENTOTREF 6.7 04/20/2022    ALBUMIN 4.5 04/20/2022    LABIL2 2.0 04/20/2022    AST 30 04/20/2022     ALT 38 04/20/2022     Lab Results   Component Value Date    GENO Negative 10/16/2019      No results found for: CRP   No results found for: IRON, TIBC, FERRITIN   Lab Results   Component Value Date    AUSAPRSN89 504 12/11/2017        Age-appropriate Screening Schedule:  Refer to the list below for future screening recommendations based on patient's age, sex and/or medical conditions. Orders for these recommended tests are listed in the plan section. The patient has been provided with a written plan.    Health Maintenance   Topic Date Due    Hepatitis B (1 of 3 - 3-dose series) Never done    COLORECTAL CANCER SCREENING  Never done    COVID-19 Vaccine (1) Never done    Pneumococcal Vaccine 0-64 (1 - PCV) Never done    TDAP/TD VACCINES (1 - Tdap) Never done    HEPATITIS C SCREENING  Never done    DIABETIC FOOT EXAM  Never done    INFLUENZA VACCINE  10/01/2023    URINE MICROALBUMIN  10/19/2023    HEMOGLOBIN A1C  01/18/2024    DIABETIC EYE EXAM  06/07/2024    ANNUAL PHYSICAL  07/18/2024    LIPID PANEL  07/25/2024           Assessment & Plan      Medications        Problem List         LOS      Physical.  Doing well.  Recommend update tetanus shot at [facility].  Flu vaccine declined.  Discussed health maintenance, screening tests, lifestyle modification.  Type 2 diabetes.  New onset.  Discussed diet, exercise, lifestyle modification.    Stable today, A1c 6.4, diet controlled. Monitor blood sugars.  Consider nutritionist referral.  Follow-up 3 month.   Consider add lisinopril, plan ophthalmology referral.  Nephrolithiasis.  Improved/resolved currently. 4 mm left distal ureter per CT 4/20, multiple other stones bilateral kidneys 4 to 5 mm.  Increase fluid intake. Urology follow up declined check xray abd.   Knee pain.  Left, patellar tendinitis.  OA contributing.  IM steroids given.  Ice, NSAIDs, rehabilitation exercises discussed.  Call return if persistent symptoms.  Gallstone.  Small stone incidental finding per CT 4/20.   Asymptomatic.  Consider surgery referral if symptoms develop.  Hyperlipidemia.  Has been off simvastatin, restart.  Familial with significant elevated triglycerides.  Improved on statin.  Follow-up recheck.  Fatty liver.  With mild elevation LFTs.  Continue fish oil.  Continue to monitor.  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  Recommend hematology follow-up, offered second opinion from alternative hematologist he declines currently.  Clinically stable on Claritin/ranitidine.  Lumbar disc disease.  Undergoing PT currently, considering epidural injections.  Followed by [employer].  Asthma.  Doing well, no recent flares. baseline inhaler use currently.  Hypertension.  Elevated today, possible component white coat hypertension.  Monitor bp at home, close follow up scheuled.  Consider increase rx if persistent elevation.  Overall good control on carvedilol.  Cardiolite stress testing benign 2012.  Carotid Dopplers normal 2015.  Discussed low-sodium diet.  Follow-up recheck.  Anxiety.  Recommend Rx he declines.  Family history of colon cancer.  His uncle.  Remote h/o colonoscoy recommend repeat / [facility] referral scheduled.    Groin sprain.  Benign exam today.  Ice, rehabilitation exercise discussed.  Start prednisone.  Call return if worsening symptoms.   CDL physical.  Cleared for 3 months today considering persistent elevation of his blood pressure, whitecoat hypertension possibly contributing, blood pressure logs given, monitor blood pressure at home, follow-up 2 weeks to recheck plan increase antihypertensive Rx if persistent evaluation          Diagnoses and all orders for this visit:    1. Annual visit for general adult medical examination with abnormal findings (Primary)    2. Type 2 diabetes mellitus with hyperglycemia, without long-term current use of insulin  -     POC Glycosylated Hemoglobin (Hb A1C)  -     POCT urinalysis dipstick, multipro    3. Essential hypertension  -      CBC & Differential    4. Dyslipidemia  -     Lipid Panel With / Chol / HDL Ratio    5. Class 2 severe obesity due to excess calories with serious comorbidity and body mass index (BMI) of 39.0 to 39.9 in adult    6. Tobacco use disorder    7. Kidney stone  -     XR Abdomen KUB; Future              Expected course, medications, and adverse effects discussed.  Call or return if worsening or persistent symptoms.  I wore protective equipment throughout this patient encounter including a mask, gloves, and eye protection.  Hand hygiene was performed before donning protective equipment and after removal when leaving the room. The complete contents of the Assessment and Plan and Data / Lab Results as documented above have been reviewed and addressed by myself with the patient today as part of an ongoing evaluation / treatment plan.  If some of the documentation has been copied from a previous note and is unchanged it indicates that this problem / plan has been assessed today but is stable from a previous visit and no changes have been recommended.  The separate E/M service provided today is significant, medically necessary, and separately identifiable.

## 2023-07-18 ENCOUNTER — OFFICE VISIT (OUTPATIENT)
Dept: FAMILY MEDICINE CLINIC | Facility: CLINIC | Age: 48
End: 2023-07-18
Payer: COMMERCIAL

## 2023-07-18 VITALS
BODY MASS INDEX: 40.02 KG/M2 | SYSTOLIC BLOOD PRESSURE: 178 MMHG | DIASTOLIC BLOOD PRESSURE: 112 MMHG | WEIGHT: 302 LBS | HEIGHT: 73 IN | HEART RATE: 78 BPM | RESPIRATION RATE: 20 BRPM | OXYGEN SATURATION: 98 %

## 2023-07-18 DIAGNOSIS — E66.01 CLASS 2 SEVERE OBESITY DUE TO EXCESS CALORIES WITH SERIOUS COMORBIDITY AND BODY MASS INDEX (BMI) OF 39.0 TO 39.9 IN ADULT: ICD-10-CM

## 2023-07-18 DIAGNOSIS — N20.0 KIDNEY STONE: ICD-10-CM

## 2023-07-18 DIAGNOSIS — F17.200 TOBACCO USE DISORDER: ICD-10-CM

## 2023-07-18 DIAGNOSIS — E11.65 TYPE 2 DIABETES MELLITUS WITH HYPERGLYCEMIA, WITHOUT LONG-TERM CURRENT USE OF INSULIN: ICD-10-CM

## 2023-07-18 DIAGNOSIS — Z00.01 ANNUAL VISIT FOR GENERAL ADULT MEDICAL EXAMINATION WITH ABNORMAL FINDINGS: Primary | ICD-10-CM

## 2023-07-18 DIAGNOSIS — E78.5 DYSLIPIDEMIA: Chronic | ICD-10-CM

## 2023-07-18 DIAGNOSIS — I10 ESSENTIAL HYPERTENSION: Chronic | ICD-10-CM

## 2023-07-18 LAB
BILIRUB BLD-MCNC: NEGATIVE MG/DL
CLARITY, POC: CLEAR
COLOR UR: YELLOW
EXPIRATION DATE: NORMAL
GLUCOSE UR STRIP-MCNC: NEGATIVE MG/DL
HBA1C MFR BLD: 6.4 %
KETONES UR QL: NEGATIVE
LEUKOCYTE EST, POC: NEGATIVE
Lab: NORMAL
NITRITE UR-MCNC: NEGATIVE MG/ML
PH UR: 6 [PH] (ref 5–8)
PROT UR STRIP-MCNC: NEGATIVE MG/DL
RBC # UR STRIP: NEGATIVE /UL
SP GR UR: 1 (ref 1–1.03)
UROBILINOGEN UR QL: NORMAL

## 2023-07-26 LAB
BASOPHILS # BLD AUTO: 0.1 X10E3/UL (ref 0–0.2)
BASOPHILS NFR BLD AUTO: 2 %
CHOLEST SERPL-MCNC: 142 MG/DL (ref 100–199)
CHOLEST/HDLC SERPL: 3.2 RATIO (ref 0–5)
EOSINOPHIL # BLD AUTO: 0.1 X10E3/UL (ref 0–0.4)
EOSINOPHIL NFR BLD AUTO: 2 %
ERYTHROCYTE [DISTWIDTH] IN BLOOD BY AUTOMATED COUNT: 12.8 % (ref 11.6–15.4)
HCT VFR BLD AUTO: 45.2 % (ref 37.5–51)
HDLC SERPL-MCNC: 44 MG/DL
HGB BLD-MCNC: 15.3 G/DL (ref 13–17.7)
IMM GRANULOCYTES # BLD AUTO: 0 X10E3/UL (ref 0–0.1)
IMM GRANULOCYTES NFR BLD AUTO: 0 %
LDLC SERPL CALC-MCNC: 76 MG/DL (ref 0–99)
LYMPHOCYTES # BLD AUTO: 1.8 X10E3/UL (ref 0.7–3.1)
LYMPHOCYTES NFR BLD AUTO: 31 %
MCH RBC QN AUTO: 30.3 PG (ref 26.6–33)
MCHC RBC AUTO-ENTMCNC: 33.8 G/DL (ref 31.5–35.7)
MCV RBC AUTO: 90 FL (ref 79–97)
MONOCYTES # BLD AUTO: 0.6 X10E3/UL (ref 0.1–0.9)
MONOCYTES NFR BLD AUTO: 11 %
NEUTROPHILS # BLD AUTO: 3.1 X10E3/UL (ref 1.4–7)
NEUTROPHILS NFR BLD AUTO: 54 %
PLATELET # BLD AUTO: 207 X10E3/UL (ref 150–450)
RBC # BLD AUTO: 5.05 X10E6/UL (ref 4.14–5.8)
TRIGL SERPL-MCNC: 121 MG/DL (ref 0–149)
VLDLC SERPL CALC-MCNC: 22 MG/DL (ref 5–40)
WBC # BLD AUTO: 5.7 X10E3/UL (ref 3.4–10.8)

## 2023-08-01 NOTE — PROGRESS NOTES
Subjective   Asad Odom is a 47 y.o. male.     Chief Complaint   Patient presents with    Hypertension       Hypertension  This is a chronic problem. The current episode started more than 1 year ago. The problem is unchanged. The problem is uncontrolled. Pertinent negatives include no anxiety, chest pain, malaise/fatigue, neck pain, palpitations, peripheral edema or shortness of breath. (Patient is feeling well. 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 ) There are no associated agents to hypertension. Risk factors for coronary artery disease include dyslipidemia, male gender, obesity and smoking/tobacco exposure. Past treatments include nothing. Current antihypertension treatment includes beta blockers. The current treatment provides moderate improvement. There are no compliance problems.  There is no history of kidney disease, CAD/MI, heart failure or left ventricular hypertrophy. There is no history of coarctation of the aorta, hyperaldosteronism, hypercortisolism, hyperparathyroidism, a hypertension causing med, pheochromocytoma, renovascular disease, sleep apnea or a thyroid problem.          I personally reviewed and updated the patient's allergies, medications, problem list, and past medical, surgical, social, and family history. I have reviewed and confirmed the accuracy of the History of Present Illness and Review of Symptoms as documented by the MA/LPN/RN. Pablo Noyola MD    Family History   Problem Relation Age of Onset    Other Mother         suicide    Other Sister         Suicide sibilingd       Social History     Tobacco Use    Smoking status: Some Days     Packs/day: 0.25     Years: 32.00     Pack years: 8.00     Types: Cigarettes     Start date: 1/1/1991     Passive exposure: Never    Smokeless tobacco: Current     Types: Chew    Tobacco comments:     Quit 7/16/19, but now chews off and on    Vaping Use    Vaping Use: Never used   Substance Use Topics     Alcohol use: Yes     Comment: occasional    Drug use: No       Past Surgical History:   Procedure Laterality Date    BONE BIOPSY      BONE MARROW BIOPSY      CYSTOSCOPY BLADDER STONE LITHOTRIPSY      KNEE MENISCAL REPAIR Left     MANDIBLE FRACTURE SURGERY         Patient Active Problem List   Diagnosis    Asthma    Dyslipidemia    Elevated serum tryptase    Essential hypertension    Mast cell disorder    Nephrolithiasis    Anxiety    GERD (gastroesophageal reflux disease)    Osteoarthritis    Carotid artery stenosis    AK (actinic keratosis)    Annual visit for general adult medical examination with abnormal findings    Tobacco use disorder    Class 2 severe obesity due to excess calories with serious comorbidity and body mass index (BMI) of 38.0 to 38.9 in adult    Arthritis    Elevated liver function tests    Acute right-sided low back pain without sciatica    Encounter for CDL (commercial driving license) exam    Hemorrhoids    Hypercholesterolemia    Elevated fasting blood sugar    Type 2 diabetes mellitus with hyperglycemia, without long-term current use of insulin    Generalized abdominal pain    Hordeolum internum of left upper eyelid    Myopia of left eye         Current Outpatient Medications:     aspirin 81 MG tablet, Take 1 tablet by mouth., Disp: , Rfl:     cetirizine (zyrTEC) 5 MG tablet, Take 1 tablet by mouth., Disp: , Rfl:     carvedilol (COREG) 25 MG tablet, TAKE 1 TABLET BY MOUTH TWICE DAILY WITH MEALS, Disp: 60 tablet, Rfl: 0    CINNAMON PO, Take  by mouth., Disp: , Rfl:     fenofibrate (TRICOR) 145 MG tablet, Take 1 tablet by mouth once daily, Disp: 90 tablet, Rfl: 0    GARLIC PO, Take  by mouth., Disp: , Rfl:     hydroCHLOROthiazide (HYDRODIURIL) 25 MG tablet, Take 1 tablet by mouth Daily., Disp: 90 tablet, Rfl: 1    hydrocortisone 1 % cream, Apply 1 application  topically to the appropriate area as directed 2 (Two) Times a Day., Disp: 60 g, Rfl: 1    lisinopril (PRINIVIL,ZESTRIL) 40 MG  "tablet, Take 1 tablet by mouth Daily., Disp: 90 tablet, Rfl: 1    multivitamin with minerals tablet tablet, Take 1 tablet by mouth Daily., Disp: , Rfl:     simvastatin (ZOCOR) 40 MG tablet, Take 1 tablet by mouth nightly, Disp: 30 tablet, Rfl: 0         Review of Systems   Constitutional:  Negative for chills, diaphoresis and malaise/fatigue.   Eyes:  Negative for visual disturbance.   Respiratory:  Negative for shortness of breath.    Cardiovascular:  Negative for chest pain and palpitations.   Gastrointestinal:  Negative for abdominal pain and nausea.   Endocrine: Negative for polydipsia and polyphagia.   Musculoskeletal:  Negative for neck pain and neck stiffness.   Skin:  Negative for color change and pallor.   Neurological:  Negative for seizures and syncope.   Hematological:  Negative for adenopathy.   Psychiatric/Behavioral:  Negative for hallucinations and suicidal ideas.      I have reviewed and confirmed the accuracy of the ROS as documented by the MA/LPN/RN Pablo Noyola MD      Objective   BP (!) 158/102 (BP Location: Right arm, Patient Position: Sitting, Cuff Size: Large Adult)   Pulse 77   Resp 20   Ht 185.4 cm (72.99\")   Wt 132 kg (290 lb)   SpO2 94%   BMI 38.27 kg/mý   BP Readings from Last 3 Encounters:   08/23/23 132/80   08/02/23 (!) 158/102   07/18/23 (!) 178/112     Wt Readings from Last 3 Encounters:   08/23/23 131 kg (288 lb 9.6 oz)   08/02/23 132 kg (290 lb)   07/18/23 (!) 137 kg (302 lb)     Physical Exam  Constitutional:       Appearance: He is well-developed. He is not diaphoretic.   HENT:      Head: Normocephalic.      Right Ear: Tympanic membrane, ear canal and external ear normal.      Left Ear: Tympanic membrane, ear canal and external ear normal.      Nose: Nose normal.   Eyes:      General: Lids are normal.      Conjunctiva/sclera: Conjunctivae normal.      Pupils: Pupils are equal, round, and reactive to light.   Neck:      Thyroid: No thyromegaly.      Vascular: No carotid " bruit or JVD.      Trachea: No tracheal deviation.   Cardiovascular:      Rate and Rhythm: Normal rate and regular rhythm.      Heart sounds: Normal heart sounds. No murmur heard.    No friction rub. No gallop.   Pulmonary:      Effort: Pulmonary effort is normal.      Breath sounds: Normal breath sounds. No stridor. No decreased breath sounds, wheezing or rales.   Abdominal:      General: Bowel sounds are normal. There is no distension.      Palpations: Abdomen is soft. There is no mass.      Tenderness: There is no abdominal tenderness. There is no guarding or rebound.      Hernia: No hernia is present.   Lymphadenopathy:      Head:      Right side of head: No submental, submandibular, tonsillar, preauricular, posterior auricular or occipital adenopathy.      Left side of head: No submental, submandibular, tonsillar, preauricular, posterior auricular or occipital adenopathy.      Cervical: No cervical adenopathy.   Skin:     General: Skin is warm and dry.      Coloration: Skin is not pale.   Neurological:      Mental Status: He is alert and oriented to person, place, and time.      Cranial Nerves: No cranial nerve deficit.      Sensory: No sensory deficit.      Coordination: Coordination normal.      Gait: Gait normal.      Deep Tendon Reflexes: Reflexes are normal and symmetric.       Data / Lab Results:    Hemoglobin A1C   Date Value Ref Range Status   07/18/2023 6.4 % Final   04/18/2023 6.6 % Final   10/19/2022 6.7 % Final        Lab Results   Component Value Date    LDL 76 07/25/2023    LDL 69 04/20/2022     (H) 10/20/2020     Lab Results   Component Value Date    CHOL 223 (H) 09/25/2018    CHOL 211 (H) 12/11/2017    CHOL 197 09/24/2016     Lab Results   Component Value Date    TRIG 121 07/25/2023    TRIG 341 (H) 04/20/2022    TRIG 626 (H) 10/20/2020     Lab Results   Component Value Date    HDL 44 07/25/2023    HDL 34 (L) 04/20/2022    HDL 34 (L) 10/20/2020     No results found for: PSA  Lab Results    Component Value Date    WBC 5.7 07/25/2023    HGB 15.3 07/25/2023    HCT 45.2 07/25/2023    MCV 90 07/25/2023     07/25/2023     Lab Results   Component Value Date    TSH 1.340 04/20/2022      Lab Results   Component Value Date    GLUCOSE 142 (H) 04/20/2022    BUN 10 04/20/2022    CREATININE 0.96 04/20/2022    EGFRIFNONA 106 10/20/2020    EGFRIFAFRI 123 10/20/2020    BCR 10 04/20/2022    K 4.7 04/20/2022    CO2 27 04/20/2022    CALCIUM 9.2 04/20/2022    PROTENTOTREF 6.7 04/20/2022    ALBUMIN 4.5 04/20/2022    LABIL2 2.0 04/20/2022    AST 30 04/20/2022    ALT 38 04/20/2022     Lab Results   Component Value Date    GENO Negative 10/16/2019      No results found for: CRP   No results found for: IRON, TIBC, FERRITIN   Lab Results   Component Value Date    NQDAUUGM95 504 12/11/2017          Assessment & Plan      Medications        Problem List         [facility] maintenance.  Doing well.  Recommend update tetanus shot at [employer].  Flu vaccine declined.  Discussed health maintenance, screening tests, lifestyle modification.  Type 2 diabetes.  New onset.  Discussed diet, exercise, lifestyle modification.    Stable today, A1c 6.4, diet controlled. Monitor blood sugars.  Consider nutritionist referral.  Follow-up 3 month.   Consider add lisinopril, plan ophthalmology referral.  Nephrolithiasis.  Improved/resolved currently. 4 mm left distal ureter per CT 4/20, multiple other stones bilateral kidneys 4 to 5 mm.  Increase fluid intake. Urology follow up declined check xray abd.   Knee pain.  Left, patellar tendinitis.  OA contributing.  IM steroids given.  Ice, NSAIDs, rehabilitation exercises discussed.  Call return if persistent symptoms.  Gallstone.  Small stone incidental finding per CT 4/20.  Asymptomatic.  Consider surgery referral if symptoms develop.  Hyperlipidemia.  Has been off simvastatin, restart.  Familial with significant elevated triglycerides.  Improved on statin.  Follow-up recheck.  Fatty  liver.  With mild elevation LFTs.  Continue fish oil.  Continue to monitor.  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  Recommend hematology follow-up, offered second opinion from alternative hematologist he declines currently.  Clinically stable on Claritin/ranitidine.  Lumbar disc disease.  Undergoing PT currently, considering epidural injections.  Followed by [employer].  Asthma.  Doing well, no recent flares. baseline inhaler use currently.  Hypertension.  Elevated today home blood pressure monitor results reviewed, add lisinopril. Monitor bp at home, close follow up scheuled.  Consider increase rx if persistent elevation.  Overall good control on carvedilol.  Cardiolite stress testing benign 2012.  Carotid Dopplers normal 2015.  Discussed low-sodium diet.  Follow-up recheck.  Anxiety.  Recommend Rx he declines.  Family history of colon cancer.  His uncle.  Remote h/o colonoscoy recommend repeat / [facility] referral scheduled.    Groin sprain.  Benign exam today.  Ice, rehabilitation exercise discussed.  Start prednisone.  Call return if worsening symptoms.   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 Essential hypertension (Primary)  -     Discontinue: lisinopril (PRINIVIL,ZESTRIL) 20 MG tablet; Take 1 tablet by mouth Daily.  Dispense: 90 tablet; Refill: 3    2. Class 2 severe obesity due to excess calories with serious comorbidity and body mass index (BMI) of 38.0 to 38.9 in adult  Assessment & Plan:  Patient's (Body mass index is 38.27 kg/mý.) indicates that they are morbidly/severely obese (BMI > 40 or > 35 with obesity - related health condition) with health conditions that include  hypertension, dyslipidemias, and GERD . Weight is unchanged. BMI  is above average; BMI management plan is completed. We discussed portion control and increasing exercise.       3. Type 2 diabetes mellitus with hyperglycemia, without long-term current use of insulin    4. Hypercholesterolemia              Expected course, medications, and adverse effects discussed.  Call or return if worsening or persistent symptoms.  I wore protective equipment throughout this patient encounter including a mask, gloves, and eye protection.  Hand hygiene was performed before donning protective equipment and after removal when leaving the room. The complete contents of the Assessment and Plan and Data/Lab Results as documented above have been reviewed and addressed by myself with the patient today as part of an ongoing evaluation / treatment plan.  If some of the documentation has been copied from a previous note and is unchanged it indicates that this problem / plan has been assessed today but is stable from a previous visit and no changes have been recommended.

## 2023-08-02 ENCOUNTER — OFFICE VISIT (OUTPATIENT)
Dept: FAMILY MEDICINE CLINIC | Facility: CLINIC | Age: 48
End: 2023-08-02
Payer: COMMERCIAL

## 2023-08-02 VITALS
DIASTOLIC BLOOD PRESSURE: 102 MMHG | SYSTOLIC BLOOD PRESSURE: 158 MMHG | HEIGHT: 73 IN | HEART RATE: 77 BPM | RESPIRATION RATE: 20 BRPM | OXYGEN SATURATION: 94 % | BODY MASS INDEX: 38.43 KG/M2 | WEIGHT: 290 LBS

## 2023-08-02 DIAGNOSIS — I10 ESSENTIAL HYPERTENSION: Primary | Chronic | ICD-10-CM

## 2023-08-02 DIAGNOSIS — E11.65 TYPE 2 DIABETES MELLITUS WITH HYPERGLYCEMIA, WITHOUT LONG-TERM CURRENT USE OF INSULIN: ICD-10-CM

## 2023-08-02 DIAGNOSIS — E78.00 HYPERCHOLESTEROLEMIA: ICD-10-CM

## 2023-08-02 DIAGNOSIS — E66.01 CLASS 2 SEVERE OBESITY DUE TO EXCESS CALORIES WITH SERIOUS COMORBIDITY AND BODY MASS INDEX (BMI) OF 38.0 TO 38.9 IN ADULT: ICD-10-CM

## 2023-08-02 RX ORDER — LISINOPRIL 20 MG/1
20 TABLET ORAL DAILY
Qty: 90 TABLET | Refills: 3 | Status: SHIPPED | OUTPATIENT
Start: 2023-08-02 | End: 2023-08-23 | Stop reason: SDUPTHER

## 2023-08-02 NOTE — ASSESSMENT & PLAN NOTE
Patient's (Body mass index is 38.27 kg/mý.) indicates that they are morbidly/severely obese (BMI > 40 or > 35 with obesity - related health condition) with health conditions that include hypertension, dyslipidemias, and GERD . Weight is unchanged. BMI  is above average; BMI management plan is completed. We discussed portion control and increasing exercise.

## 2023-08-05 DIAGNOSIS — I10 ESSENTIAL HYPERTENSION: ICD-10-CM

## 2023-08-05 DIAGNOSIS — E78.5 DYSLIPIDEMIA: ICD-10-CM

## 2023-08-05 DIAGNOSIS — E78.00 HYPERCHOLESTEROLEMIA: ICD-10-CM

## 2023-08-07 RX ORDER — CARVEDILOL 25 MG/1
TABLET ORAL
Qty: 60 TABLET | Refills: 0 | Status: SHIPPED | OUTPATIENT
Start: 2023-08-07

## 2023-08-07 RX ORDER — SIMVASTATIN 40 MG
TABLET ORAL
Qty: 30 TABLET | Refills: 0 | Status: SHIPPED | OUTPATIENT
Start: 2023-08-07

## 2023-08-07 RX ORDER — FENOFIBRATE 145 MG/1
TABLET, COATED ORAL
Qty: 90 TABLET | Refills: 0 | Status: SHIPPED | OUTPATIENT
Start: 2023-08-07

## 2023-08-21 NOTE — PROGRESS NOTES
Subjective   Asad Odom is a 47 y.o. male.     Chief Complaint   Patient presents with    Hypertension    Hyperlipidemia       Hypertension  This is a chronic problem. The current episode started more than 1 year ago. The problem is unchanged. The problem is uncontrolled. Pertinent negatives include no anxiety, chest pain, malaise/fatigue, neck pain, palpitations, peripheral edema or shortness of breath. (Patient is feeling well. He checks BP daily and averages 160s/95s. Denies any issues) There are no associated agents to hypertension. Risk factors for coronary artery disease include dyslipidemia, male gender, obesity and smoking/tobacco exposure. Past treatments include nothing. Current antihypertension treatment includes beta blockers. The current treatment provides moderate improvement. There are no compliance problems.  There is no history of kidney disease, CAD/MI, heart failure or left ventricular hypertrophy. There is no history of coarctation of the aorta, hyperaldosteronism, hypercortisolism, hyperparathyroidism, a hypertension causing med, pheochromocytoma, renovascular disease, sleep apnea or a thyroid problem.   Hyperlipidemia  This is a chronic problem. The current episode started more than 1 year ago. Exacerbating diseases include obesity. Pertinent negatives include no chest pain or shortness of breath. Current antihyperlipidemic treatment includes statins. Risk factors for coronary artery disease include dyslipidemia, diabetes mellitus, hypertension, male sex and obesity.          I personally reviewed and updated the patient's allergies, medications, problem list, and past medical, surgical, social, and family history. I have reviewed and confirmed the accuracy of the History of Present Illness and Review of Symptoms as documented by the MA/NOEMY/RN. Cristiane Mendez MA    Family History   Problem Relation Age of Onset    Other Mother         suicide    Other Sister         Suicide sibilingd        Social History     Tobacco Use    Smoking status: Some Days     Packs/day: 0.25     Years: 32.00     Pack years: 8.00     Types: Cigarettes     Start date: 1/1/1991     Passive exposure: Never    Smokeless tobacco: Current     Types: Chew    Tobacco comments:     Quit 7/16/19, but now chews off and on    Vaping Use    Vaping Use: Never used   Substance Use Topics    Alcohol use: Yes     Comment: occasional    Drug use: No       Past Surgical History:   Procedure Laterality Date    BONE BIOPSY      BONE MARROW BIOPSY      CYSTOSCOPY BLADDER STONE LITHOTRIPSY      KNEE MENISCAL REPAIR Left     MANDIBLE FRACTURE SURGERY         Patient Active Problem List   Diagnosis    Asthma    Dyslipidemia    Elevated serum tryptase    Essential hypertension    Mast cell disorder    Nephrolithiasis    Anxiety    GERD (gastroesophageal reflux disease)    Osteoarthritis    Carotid artery stenosis    AK (actinic keratosis)    Annual visit for general adult medical examination with abnormal findings    Tobacco use disorder    Class 2 severe obesity due to excess calories with serious comorbidity and body mass index (BMI) of 38.0 to 38.9 in adult    Arthritis    Elevated liver function tests    Acute right-sided low back pain without sciatica    Encounter for CDL (commercial driving license) exam    Hemorrhoids    Hypercholesterolemia    Elevated fasting blood sugar    Type 2 diabetes mellitus with hyperglycemia, without long-term current use of insulin    Generalized abdominal pain    Hordeolum internum of left upper eyelid    Myopia of left eye         Current Outpatient Medications:     aspirin 81 MG tablet, Take 1 tablet by mouth., Disp: , Rfl:     carvedilol (COREG) 25 MG tablet, TAKE 1 TABLET BY MOUTH TWICE DAILY WITH MEALS, Disp: 60 tablet, Rfl: 0    cetirizine (zyrTEC) 5 MG tablet, Take 1 tablet by mouth., Disp: , Rfl:     CINNAMON PO, Take  by mouth., Disp: , Rfl:     fenofibrate (TRICOR) 145 MG tablet, Take 1 tablet by  "mouth once daily, Disp: 90 tablet, Rfl: 0    GARLIC PO, Take  by mouth., Disp: , Rfl:     lisinopril (PRINIVIL,ZESTRIL) 20 MG tablet, Take 1 tablet by mouth Daily., Disp: 90 tablet, Rfl: 3    multivitamin with minerals tablet tablet, Take 1 tablet by mouth Daily., Disp: , Rfl:     simvastatin (ZOCOR) 40 MG tablet, Take 1 tablet by mouth nightly, Disp: 30 tablet, Rfl: 0         Review of Systems   Constitutional:  Negative for chills, diaphoresis and malaise/fatigue.   Eyes:  Negative for visual disturbance.   Respiratory:  Negative for shortness of breath.    Cardiovascular:  Negative for chest pain and palpitations.   Gastrointestinal:  Negative for abdominal pain and nausea.   Endocrine: Negative for polydipsia and polyphagia.   Musculoskeletal:  Negative for neck pain and neck stiffness.   Skin:  Negative for color change and pallor.   Neurological:  Negative for seizures and syncope.   Hematological:  Negative for adenopathy.   Psychiatric/Behavioral:  Negative for hallucinations and suicidal ideas.      I have reviewed and confirmed the accuracy of the ROS as documented by the MA/LPN/RN Cristiane Mendez MA      Objective   /80 (BP Location: Right arm, Patient Position: Sitting, Cuff Size: Adult)   Pulse 76   Temp 98.4 °F (36.9 °C) (Temporal)   Resp 18   Ht 185.4 cm (72.99\")   Wt 131 kg (288 lb 9.6 oz)   SpO2 94%   BMI 38.08 kg/m²   BP Readings from Last 3 Encounters:   08/23/23 132/80   08/02/23 (!) 158/102   07/18/23 (!) 178/112     Wt Readings from Last 3 Encounters:   08/23/23 131 kg (288 lb 9.6 oz)   08/02/23 132 kg (290 lb)   07/18/23 (!) 137 kg (302 lb)     Physical Exam  Constitutional:       Appearance: Normal appearance. He is well-developed. He is not diaphoretic.   Cardiovascular:      Rate and Rhythm: Normal rate and regular rhythm.      Pulses: Normal pulses.      Heart sounds: Normal heart sounds, S1 normal and S2 normal. No murmur heard.    No friction rub. No gallop.   Pulmonary:      " Effort: Pulmonary effort is normal. No accessory muscle usage.      Breath sounds: Normal breath sounds. No stridor. No decreased breath sounds, wheezing, rhonchi or rales.   Abdominal:      General: Bowel sounds are normal. There is no distension.      Palpations: Abdomen is soft. Abdomen is not rigid. There is no mass or pulsatile mass.      Tenderness: There is no abdominal tenderness. There is no guarding or rebound. Negative signs include Randolph's sign.      Hernia: No hernia is present.   Skin:     General: Skin is warm and dry.      Coloration: Skin is not pale.   Neurological:      Mental Status: He is alert and oriented to person, place, and time.      Coordination: Coordination normal.      Gait: Gait normal.       Data / Lab Results:    Hemoglobin A1C   Date Value Ref Range Status   07/18/2023 6.4 % Final   04/18/2023 6.6 % Final   10/19/2022 6.7 % Final        Lab Results   Component Value Date    LDL 76 07/25/2023    LDL 69 04/20/2022     (H) 10/20/2020     Lab Results   Component Value Date    CHOL 223 (H) 09/25/2018    CHOL 211 (H) 12/11/2017    CHOL 197 09/24/2016     Lab Results   Component Value Date    TRIG 121 07/25/2023    TRIG 341 (H) 04/20/2022    TRIG 626 (H) 10/20/2020     Lab Results   Component Value Date    HDL 44 07/25/2023    HDL 34 (L) 04/20/2022    HDL 34 (L) 10/20/2020     No results found for: PSA  Lab Results   Component Value Date    WBC 5.7 07/25/2023    HGB 15.3 07/25/2023    HCT 45.2 07/25/2023    MCV 90 07/25/2023     07/25/2023     Lab Results   Component Value Date    TSH 1.340 04/20/2022      Lab Results   Component Value Date    GLUCOSE 142 (H) 04/20/2022    BUN 10 04/20/2022    CREATININE 0.96 04/20/2022    EGFRIFNONA 106 10/20/2020    EGFRIFAFRI 123 10/20/2020    BCR 10 04/20/2022    K 4.7 04/20/2022    CO2 27 04/20/2022    CALCIUM 9.2 04/20/2022    PROTENTOTREF 6.7 04/20/2022    ALBUMIN 4.5 04/20/2022    LABIL2 2.0 04/20/2022    AST 30 04/20/2022    ALT 38  04/20/2022     Lab Results   Component Value Date    GENO Negative 10/16/2019      No results found for: CRP   No results found for: IRON, TIBC, FERRITIN   Lab Results   Component Value Date    [id number] 504 12/11/2017          Assessment & Plan      Medications        Problem List         [facility] maintenance.  Doing well.  Recommend update tetanus shot at [email or web address].  Flu vaccine declined.  Discussed health maintenance, screening tests, lifestyle modification.  Type 2 diabetes.  New onset.  Discussed diet, exercise, lifestyle modification.    Stable today, A1c 6.4, diet controlled. Monitor blood sugars.  Consider nutritionist referral.  Follow-up 3 month.   Consider add lisinopril, plan ophthalmology referral.  Nephrolithiasis.  Improved/resolved currently. 4 mm left distal ureter per CT 4/20, multiple other stones bilateral kidneys 4 to 5 mm.  Increase fluid intake. Urology follow up declined check xray abd.   Knee pain.  Left, patellar tendinitis.  OA contributing.  IM steroids given.  Ice, NSAIDs, rehabilitation exercises discussed.  Call return if persistent symptoms.  Gallstone.  Small stone incidental finding per CT 4/20.  Asymptomatic.  Consider surgery referral if symptoms develop.  Hyperlipidemia.  Has been off simvastatin, restart.  Familial with significant elevated triglycerides.  Improved on statin.  Follow-up recheck.  Fatty liver.  With mild elevation LFTs.  Continue fish oil.  Continue to monitor.  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  Recommend hematology follow-up, offered second opinion from alternative hematologist he declines currently.  Clinically stable on Claritin/ranitidine.  Lumbar disc disease.  Undergoing PT currently, considering epidural injections.  Followed by [employer].  Asthma.  Doing well, no recent flares. baseline inhaler use currently.  Hypertension.  Persistent elevation today home blood pressure monitor results reviewed, increase  lisinopril/add HCTZ. Monitor bp at home, close follow up scheuled.  Consider increase rx if persistent elevation.  Overall good control on carvedilol.  Cardiolite stress testing benign 2012.  Carotid Dopplers normal 2015.  Discussed low-sodium diet.  Follow-up recheck.  Anxiety.  Recommend Rx he declines.  Family history of colon cancer.  His uncle.  Remote h/o colonoscoy recommend repeat / [facility] referral scheduled.    Groin sprain.  Benign exam today.  Ice, rehabilitation exercise discussed.  Start prednisone.  Call return if worsening symptoms.   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 Essential hypertension (Primary)    2. Hypercholesterolemia    3. Tobacco use disorder    4. Class 2 severe obesity due to excess calories with serious comorbidity and body mass index (BMI) of 38.0 to 38.9 in adult  Assessment & Plan:  Patient's (Body mass index is 38.08 kg/m².) indicates that they are morbidly/severely obese (BMI > 40 or > 35 with obesity - related health condition) with health conditions that include hypertension, diabetes mellitus, and dyslipidemias . Weight is unchanged. BMI  is above average; BMI management plan is completed. We discussed portion control and increasing exercise.                 Expected course, medications, and adverse effects discussed.  Call or return if worsening or persistent symptoms.  I wore protective equipment throughout this patient encounter including a mask, gloves, and eye protection.  Hand hygiene was performed before donning protective equipment and after removal when leaving the room. The complete contents of the Assessment and Plan and Data/Lab Results as documented above have been  reviewed and addressed by myself with the patient today as part of an ongoing evaluation / treatment plan.  If some of the documentation has been copied from a previous note and is unchanged it indicates that this problem / plan has been assessed today but is stable from a previous visit and no changes have been recommended.

## 2023-08-23 ENCOUNTER — OFFICE VISIT (OUTPATIENT)
Dept: FAMILY MEDICINE CLINIC | Facility: CLINIC | Age: 48
End: 2023-08-23
Payer: COMMERCIAL

## 2023-08-23 VITALS
RESPIRATION RATE: 18 BRPM | SYSTOLIC BLOOD PRESSURE: 132 MMHG | BODY MASS INDEX: 38.25 KG/M2 | HEIGHT: 73 IN | OXYGEN SATURATION: 94 % | TEMPERATURE: 98.4 F | HEART RATE: 76 BPM | DIASTOLIC BLOOD PRESSURE: 80 MMHG | WEIGHT: 288.6 LBS

## 2023-08-23 DIAGNOSIS — E66.01 CLASS 2 SEVERE OBESITY DUE TO EXCESS CALORIES WITH SERIOUS COMORBIDITY AND BODY MASS INDEX (BMI) OF 38.0 TO 38.9 IN ADULT: ICD-10-CM

## 2023-08-23 DIAGNOSIS — E78.00 HYPERCHOLESTEROLEMIA: ICD-10-CM

## 2023-08-23 DIAGNOSIS — F17.200 TOBACCO USE DISORDER: ICD-10-CM

## 2023-08-23 DIAGNOSIS — I10 ESSENTIAL HYPERTENSION: Primary | Chronic | ICD-10-CM

## 2023-08-23 DIAGNOSIS — K64.9 HEMORRHOIDS, UNSPECIFIED HEMORRHOID TYPE: ICD-10-CM

## 2023-08-23 PROCEDURE — 99213 OFFICE O/P EST LOW 20 MIN: CPT | Performed by: FAMILY MEDICINE

## 2023-08-23 RX ORDER — HYDROCHLOROTHIAZIDE 25 MG/1
25 TABLET ORAL DAILY
Qty: 90 TABLET | Refills: 1 | Status: SHIPPED | OUTPATIENT
Start: 2023-08-23

## 2023-08-23 RX ORDER — DIAPER,BRIEF,INFANT-TODD,DISP
1 EACH MISCELLANEOUS 2 TIMES DAILY
Qty: 60 G | Refills: 1 | Status: SHIPPED | OUTPATIENT
Start: 2023-08-23

## 2023-08-23 RX ORDER — LISINOPRIL 40 MG/1
40 TABLET ORAL DAILY
Qty: 90 TABLET | Refills: 1 | Status: SHIPPED | OUTPATIENT
Start: 2023-08-23

## 2023-08-23 RX ORDER — MULTIPLE VITAMINS W/ MINERALS TAB 9MG-400MCG
1 TAB ORAL DAILY
COMMUNITY

## 2023-08-23 NOTE — ASSESSMENT & PLAN NOTE
Patient's (Body mass index is 38.08 kg/m².) indicates that they are morbidly/severely obese (BMI > 40 or > 35 with obesity - related health condition) with health conditions that include hypertension, diabetes mellitus, and dyslipidemias . Weight is unchanged. BMI  is above average; BMI management plan is completed. We discussed portion control and increasing exercise.

## 2023-09-12 DIAGNOSIS — I10 ESSENTIAL HYPERTENSION: ICD-10-CM

## 2023-09-12 DIAGNOSIS — E78.5 DYSLIPIDEMIA: ICD-10-CM

## 2023-09-12 RX ORDER — CARVEDILOL 25 MG/1
TABLET ORAL
Qty: 60 TABLET | Refills: 0 | Status: SHIPPED | OUTPATIENT
Start: 2023-09-12

## 2023-09-12 RX ORDER — SIMVASTATIN 40 MG
TABLET ORAL
Qty: 30 TABLET | Refills: 0 | Status: SHIPPED | OUTPATIENT
Start: 2023-09-12

## 2023-10-18 DIAGNOSIS — E78.5 DYSLIPIDEMIA: ICD-10-CM

## 2023-10-18 RX ORDER — SIMVASTATIN 40 MG
TABLET ORAL
Qty: 30 TABLET | Refills: 0 | Status: SHIPPED | OUTPATIENT
Start: 2023-10-18

## 2023-10-24 NOTE — PROGRESS NOTES
Subjective   Asad Odom is a 48 y.o. male.     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 He checks his Blood Pressure twice a day (once in the morning and once in the afternoon). He has been averaging 130-140s over 80-90s  Hypertension  This is a chronic problem. The current episode started more than 1 year ago. The problem is unchanged. The problem is uncontrolled. Pertinent negatives include no anxiety, chest pain, malaise/fatigue, neck pain, palpitations, peripheral edema or shortness of breath. There are no associated agents to hypertension. Risk factors for coronary artery disease include dyslipidemia, male gender, obesity and smoking/tobacco exposure. Past treatments include nothing. Current antihypertension treatment includes beta blockers. The current treatment provides moderate improvement. There are no compliance problems.  There is no history of kidney disease, CAD/MI, heart failure or left ventricular hypertrophy. There is no history of coarctation of the aorta, hyperaldosteronism, hypercortisolism, hyperparathyroidism, a hypertension causing med, pheochromocytoma, renovascular disease, sleep apnea or a thyroid problem.            I personally reviewed and updated the patient's allergies, medications, problem list, and past medical, surgical, social, and family history. I have reviewed and confirmed the accuracy of the History of Present Illness and Review of Symptoms as documented by the MA/SIDN/RN. Pablo Noyola MD    Family History   Problem Relation Age of Onset    Other Mother         suicide    Other Sister         Suicide sibilingd       Social History     Tobacco Use    Smoking status: Some Days     Packs/day: 0.25     Years: 32.00     Additional pack years: 0.00     Total pack years: 8.00     Types: Cigarettes, Cigars     Start date: 1/1/1991     Passive exposure: Never    Smokeless  tobacco: Current     Types: Chew    Tobacco comments:     Quit 7/16/19, but now chews off and on    Vaping Use    Vaping Use: Never used   Substance Use Topics    Alcohol use: Yes     Comment: occasional    Drug use: No       Past Surgical History:   Procedure Laterality Date    BONE BIOPSY      BONE MARROW BIOPSY      CYSTOSCOPY BLADDER STONE LITHOTRIPSY      KNEE MENISCAL REPAIR Left     MANDIBLE FRACTURE SURGERY         Patient Active Problem List   Diagnosis    Asthma    Dyslipidemia    Elevated serum tryptase    Essential hypertension    Mast cell disorder    Nephrolithiasis    Anxiety    GERD (gastroesophageal reflux disease)    Osteoarthritis    Carotid artery stenosis    AK (actinic keratosis)    Annual visit for general adult medical examination with abnormal findings    Tobacco use disorder    Class 2 severe obesity due to excess calories with serious comorbidity and body mass index (BMI) of 38.0 to 38.9 in adult    Arthritis    Elevated liver function tests    Acute right-sided low back pain without sciatica    Encounter for CDL (commercial driving license) exam    Hemorrhoids    Hypercholesterolemia    Elevated fasting blood sugar    Type 2 diabetes mellitus with hyperglycemia, without long-term current use of insulin    Generalized abdominal pain    Hordeolum internum of left upper eyelid    Myopia of left eye         Current Outpatient Medications:     aspirin 81 MG tablet, Take 1 tablet by mouth., Disp: , Rfl:     carvedilol (COREG) 25 MG tablet, TAKE 1 TABLET BY MOUTH TWICE DAILY WITH MEALS, Disp: 60 tablet, Rfl: 0    cetirizine (zyrTEC) 5 MG tablet, Take 1 tablet by mouth., Disp: , Rfl:     CINNAMON PO, Take  by mouth., Disp: , Rfl:     fenofibrate (TRICOR) 145 MG tablet, Take 1 tablet by mouth once daily, Disp: 90 tablet, Rfl: 0    GARLIC PO, Take  by mouth., Disp: , Rfl:     hydroCHLOROthiazide (HYDRODIURIL) 25 MG tablet, Take 1 tablet by mouth Daily., Disp: 90 tablet, Rfl: 1    hydrocortisone 1 %  "cream, Apply 1 application  topically to the appropriate area as directed 2 (Two) Times a Day., Disp: 60 g, Rfl: 1    lisinopril (PRINIVIL,ZESTRIL) 40 MG tablet, Take 1 tablet by mouth Daily., Disp: 90 tablet, Rfl: 1    multivitamin with minerals tablet tablet, Take 1 tablet by mouth Daily., Disp: , Rfl:     simvastatin (ZOCOR) 40 MG tablet, Take 1 tablet by mouth nightly, Disp: 30 tablet, Rfl: 0         Review of Systems   Constitutional:  Negative for chills, diaphoresis and malaise/fatigue.   Eyes:  Negative for visual disturbance.   Respiratory:  Negative for shortness of breath.    Cardiovascular:  Negative for chest pain and palpitations.   Gastrointestinal:  Negative for abdominal pain and nausea.   Endocrine: Negative for polydipsia and polyphagia.   Musculoskeletal:  Negative for neck pain and neck stiffness.   Skin:  Negative for color change and pallor.   Neurological:  Negative for seizures and syncope.   Hematological:  Negative for adenopathy.   Psychiatric/Behavioral:  Negative for hallucinations and suicidal ideas.        I have reviewed and confirmed the accuracy of the ROS as documented by the MA/LPN/RN Pablo Noyola MD      Objective   /78 (BP Location: Right arm, Patient Position: Sitting, Cuff Size: Large Adult)   Pulse 72   Temp 98 °F (36.7 °C) (Temporal)   Resp 18   Ht 185.4 cm (72.99\")   Wt 134 kg (295 lb)   SpO2 97%   BMI 38.93 kg/m²   BP Readings from Last 3 Encounters:   10/25/23 128/78   08/23/23 132/80   08/02/23 (!) 158/102     Wt Readings from Last 3 Encounters:   10/25/23 134 kg (295 lb)   08/23/23 131 kg (288 lb 9.6 oz)   08/02/23 132 kg (290 lb)     Physical Exam  Constitutional:       Appearance: Normal appearance. He is well-developed. He is not diaphoretic.   HENT:      Head: Normocephalic.      Right Ear: Tympanic membrane, ear canal and external ear normal.      Left Ear: Tympanic membrane, ear canal and external ear normal.      Nose: Nose normal.   Eyes:      " General: Lids are normal.      Conjunctiva/sclera: Conjunctivae normal.      Pupils: Pupils are equal, round, and reactive to light.   Neck:      Thyroid: No thyromegaly.      Vascular: No carotid bruit or JVD.      Trachea: No tracheal deviation.   Cardiovascular:      Rate and Rhythm: Normal rate and regular rhythm.      Pulses: Normal pulses.      Heart sounds: Normal heart sounds, S1 normal and S2 normal. No murmur heard.     No friction rub. No gallop.   Pulmonary:      Effort: Pulmonary effort is normal. No accessory muscle usage.      Breath sounds: Normal breath sounds. No stridor. No decreased breath sounds, wheezing, rhonchi or rales.   Abdominal:      General: Bowel sounds are normal. There is no distension.      Palpations: Abdomen is soft. Abdomen is not rigid. There is no mass or pulsatile mass.      Tenderness: There is no abdominal tenderness. There is no guarding or rebound. Negative signs include Randolph's sign.      Hernia: No hernia is present.   Lymphadenopathy:      Head:      Right side of head: No submental, submandibular, tonsillar, preauricular, posterior auricular or occipital adenopathy.      Left side of head: No submental, submandibular, tonsillar, preauricular, posterior auricular or occipital adenopathy.      Cervical: No cervical adenopathy.   Skin:     General: Skin is warm and dry.      Coloration: Skin is not pale.   Neurological:      Mental Status: He is alert and oriented to person, place, and time.      Cranial Nerves: No cranial nerve deficit.      Sensory: No sensory deficit.      Coordination: Coordination normal.      Gait: Gait normal.      Deep Tendon Reflexes: Reflexes are normal and symmetric.         Data / Lab Results:    Hemoglobin A1C   Date Value Ref Range Status   10/25/2023 7.5 (A) 4.5 - 5.7 % Final   07/18/2023 6.4 % Final   04/18/2023 6.6 % Final        Lab Results   Component Value Date    LDL 76 07/25/2023    LDL 69 04/20/2022     (H) 10/20/2020  "    Lab Results   Component Value Date    CHOL 223 (H) 09/25/2018    CHOL 211 (H) 12/11/2017    CHOL 197 09/24/2016     Lab Results   Component Value Date    TRIG 121 07/25/2023    TRIG 341 (H) 04/20/2022    TRIG 626 (H) 10/20/2020     Lab Results   Component Value Date    HDL 44 07/25/2023    HDL 34 (L) 04/20/2022    HDL 34 (L) 10/20/2020     No results found for: \"PSA\"  Lab Results   Component Value Date    WBC 5.7 07/25/2023    HGB 15.3 07/25/2023    HCT 45.2 07/25/2023    MCV 90 07/25/2023     07/25/2023     Lab Results   Component Value Date    TSH 1.340 04/20/2022      Lab Results   Component Value Date    GLUCOSE 142 (H) 04/20/2022    BUN 10 04/20/2022    CREATININE 0.96 04/20/2022    EGFRIFNONA 106 10/20/2020    EGFRIFAFRI 123 10/20/2020    BCR 10 04/20/2022    K 4.7 04/20/2022    CO2 27 04/20/2022    CALCIUM 9.2 04/20/2022    PROTENTOTREF 6.7 04/20/2022    ALBUMIN 4.5 04/20/2022    LABIL2 2.0 04/20/2022    AST 30 04/20/2022    ALT 38 04/20/2022     Lab Results   Component Value Date    GENO Negative 10/16/2019      No results found for: \"CRP\"   No results found for: \"IRON\", \"TIBC\", \"FERRITIN\"   Lab Results   Component Value Date    HGBDOYNZ85 504 12/11/2017          Assessment & Plan      Medications        Problem List         LOS    [facility] maintenance.  Doing well.  Recommend update tetanus shot at [employer].  Flu vaccine declined.  Discussed health maintenance, screening tests, lifestyle modification.  Type 2 diabetes.  New onset.  Discussed diet, exercise, lifestyle modification.    Worse today, A1c to 7.5 has not been watching diet, restart, overall good control/diet controlled. Monitor blood sugars.  Consider nutritionist referral.  Follow-up 3 month.   Consider add lisinopril, plan ophthalmology referral.  Nephrolithiasis.  Improved/resolved currently. 4 mm left distal ureter per CT 4/20, multiple other stones bilateral kidneys 4 to 5 mm.  Increase fluid intake. Urology follow up declined check " xray abd.   Knee pain.  Left, patellar tendinitis.  OA contributing.  IM steroids given.  Ice, NSAIDs, rehabilitation exercises discussed.  Call return if persistent symptoms.  Gallstone.  Small stone incidental finding per CT 4/20.  Asymptomatic.  Consider surgery referral if symptoms develop.  Hyperlipidemia.  Has been off simvastatin, restart.  Familial with significant elevated triglycerides.  Improved on statin.  Follow-up recheck.  Fatty liver.  With mild elevation LFTs.  Continue fish oil.  Continue to monitor.  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  Recommend hematology follow-up, offered second opinion from alternative hematologist he declines currently.  Clinically stable on Claritin/ranitidine.  Lumbar disc disease.  Undergoing PT currently, considering epidural injections.  Followed by [employer].  Asthma.  Doing well, no recent flares. baseline inhaler use currently.  Hypertension.  Much improved, normotensive today home blood pressure monitor results reviewed, tolerating increase lisinopril/add HCTZ. Monitor bp at home, close follow up scheuled.  Consider increase rx if persistent elevation.  Overall good control on carvedilol.  Cardiolite stress testing benign 2012.  Carotid Dopplers normal 2015.  Discussed low-sodium diet.  Follow-up recheck.  Anxiety.  Recommend Rx he declines.  Family history of colon cancer.  His uncle.  Remote h/o colonoscoy recommend repeat / [facility] referral scheduled.    Groin sprain.  Benign exam today.  Ice, rehabilitation exercise discussed.  Start prednisone.  Call return if worsening symptoms.   CDL physical.  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          Diagnoses and all orders for this visit:    1. Essential hypertension (Primary)    2. Class 2 severe obesity due to excess calories with serious comorbidity and body mass index (BMI) of 38.0 to 38.9 in adult    3. Tobacco use disorder    4. Type 2 diabetes  mellitus with hyperglycemia, without long-term current use of insulin  -     POC Glycosylated Hemoglobin (Hb A1C)    Other orders  -     Cancel: POCT urinalysis dipstick, manual              Expected course, medications, and adverse effects discussed.  Call or return if worsening or persistent symptoms.  I wore protective equipment throughout this patient encounter including a mask, gloves, and eye protection.  Hand hygiene was performed before donning protective equipment and after removal when leaving the room. The complete contents of the Assessment and Plan and Data/Lab Results as documented above have been reviewed and addressed by myself with the patient today as part of an ongoing evaluation / treatment plan.  If some of the documentation has been copied from a previous note and is unchanged it indicates that this problem / plan has been assessed today but is stable from a previous visit and no changes have been recommended.

## 2023-10-25 ENCOUNTER — CLINICAL SUPPORT (OUTPATIENT)
Dept: FAMILY MEDICINE CLINIC | Facility: CLINIC | Age: 48
End: 2023-10-25
Payer: COMMERCIAL

## 2023-10-25 VITALS
BODY MASS INDEX: 39.1 KG/M2 | DIASTOLIC BLOOD PRESSURE: 78 MMHG | TEMPERATURE: 98 F | RESPIRATION RATE: 18 BRPM | OXYGEN SATURATION: 97 % | HEIGHT: 73 IN | SYSTOLIC BLOOD PRESSURE: 128 MMHG | WEIGHT: 295 LBS | HEART RATE: 72 BPM

## 2023-10-25 DIAGNOSIS — E11.65 TYPE 2 DIABETES MELLITUS WITH HYPERGLYCEMIA, WITHOUT LONG-TERM CURRENT USE OF INSULIN: ICD-10-CM

## 2023-10-25 DIAGNOSIS — E66.01 CLASS 2 SEVERE OBESITY DUE TO EXCESS CALORIES WITH SERIOUS COMORBIDITY AND BODY MASS INDEX (BMI) OF 38.0 TO 38.9 IN ADULT: ICD-10-CM

## 2023-10-25 DIAGNOSIS — I10 ESSENTIAL HYPERTENSION: Primary | Chronic | ICD-10-CM

## 2023-10-25 DIAGNOSIS — F17.200 TOBACCO USE DISORDER: ICD-10-CM

## 2023-10-25 LAB
EXPIRATION DATE: ABNORMAL
HBA1C MFR BLD: 7.5 % (ref 4.5–5.7)
Lab: ABNORMAL

## 2023-11-06 DIAGNOSIS — E78.00 HYPERCHOLESTEROLEMIA: ICD-10-CM

## 2023-11-06 RX ORDER — FENOFIBRATE 145 MG/1
TABLET, COATED ORAL
Qty: 90 TABLET | Refills: 0 | Status: SHIPPED | OUTPATIENT
Start: 2023-11-06

## 2023-11-12 DIAGNOSIS — I10 ESSENTIAL HYPERTENSION: ICD-10-CM

## 2023-11-13 RX ORDER — CARVEDILOL 25 MG/1
TABLET ORAL
Qty: 60 TABLET | Refills: 0 | Status: SHIPPED | OUTPATIENT
Start: 2023-11-13

## 2023-12-13 ENCOUNTER — TELEPHONE (OUTPATIENT)
Dept: FAMILY MEDICINE CLINIC | Facility: CLINIC | Age: 48
End: 2023-12-13
Payer: COMMERCIAL

## 2023-12-13 NOTE — TELEPHONE ENCOUNTER
Asad came in requesting a refill on his yearly Antibiotic for his Gunk he gets around this time every year. He stated that Dr. Noyola always refills this, but not sure of the name of the medication.

## 2023-12-15 ENCOUNTER — TELEPHONE (OUTPATIENT)
Dept: FAMILY MEDICINE CLINIC | Facility: CLINIC | Age: 48
End: 2023-12-15
Payer: COMMERCIAL

## 2023-12-15 NOTE — TELEPHONE ENCOUNTER
"I called and spoke with Asad. He states he has a upper resp infection and would like an antibiotic called in. I explained we would need to see him to evaluate and get this sent in. I offered our next available appointment to which he denied. Stating that he \"sure as nikki ray coming in here\" explained that it's best to evaluate him to get him treatment if needed and he refused to come in stating that it's always sent in for him if he calls. I told him if he was unable to come in for evaluation I recommend the urgent care to which he replied that he went in there and it was full of people coughing and snotting so he left.  "

## 2023-12-15 NOTE — TELEPHONE ENCOUNTER
Mr carter called back to check to see if Dr hatch staff sent in his antibiotic, just was wanting to check the progress on this

## 2024-01-03 DIAGNOSIS — I10 ESSENTIAL HYPERTENSION: ICD-10-CM

## 2024-01-04 RX ORDER — CARVEDILOL 25 MG/1
TABLET ORAL
Qty: 60 TABLET | Refills: 0 | Status: SHIPPED | OUTPATIENT
Start: 2024-01-04

## 2024-02-20 DIAGNOSIS — E78.00 HYPERCHOLESTEROLEMIA: ICD-10-CM

## 2024-02-20 RX ORDER — FENOFIBRATE 145 MG/1
TABLET, COATED ORAL
Qty: 90 TABLET | Refills: 0 | OUTPATIENT
Start: 2024-02-20

## 2024-03-10 DIAGNOSIS — I10 ESSENTIAL HYPERTENSION: ICD-10-CM

## 2024-03-11 RX ORDER — CARVEDILOL 25 MG/1
TABLET ORAL
Qty: 60 TABLET | Refills: 0 | OUTPATIENT
Start: 2024-03-11

## 2024-03-18 NOTE — PROGRESS NOTES
Subjective   Asad Odom is a 48 y.o. male.     Chief Complaint   Patient presents with    Diabetes    Hyperlipidemia    Hypertension       History of Present Illness  Diabetic Foot Exam Performed   Hypertension  This is a chronic problem. The current episode started more than 1 year ago. The problem is unchanged. The problem is uncontrolled. Associated symptoms include headaches and shortness of breath. Pertinent negatives include no anxiety, blurred vision, chest pain, malaise/fatigue, neck pain, orthopnea, palpitations, peripheral edema, PND or sweats. (Patient is feeling well. 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 ) Risk factors for coronary artery disease include dyslipidemia, male gender, obesity and smoking/tobacco exposure. Current antihypertension treatment includes beta blockers. The current treatment provides moderate improvement. There are no compliance problems.  There is no history of angina, kidney disease, CAD/MI, CVA, heart failure, left ventricular hypertrophy, PVD or retinopathy. There is no history of chronic renal disease, coarctation of the aorta, hyperaldosteronism, hypercortisolism, hyperparathyroidism, a hypertension causing med, pheochromocytoma, renovascular disease, sleep apnea or a thyroid problem.   Hyperlipidemia  This is a chronic problem. The current episode started more than 1 year ago. Recent lipid tests were reviewed and are high. Exacerbating diseases include diabetes and obesity. He has no history of chronic renal disease, hypothyroidism, liver disease or nephrotic syndrome. Associated symptoms include leg pain and shortness of breath. Pertinent negatives include no chest pain, focal sensory loss, focal weakness or myalgias. Current antihyperlipidemic treatment includes statins. There are no compliance problems.  Risk factors for coronary artery disease include dyslipidemia, hypertension, male sex and obesity.   Diabetes  He  presents for his follow-up diabetic visit. Hypoglycemia symptoms include headaches. Pertinent negatives for hypoglycemia include no confusion, dizziness, hunger, mood changes, nervousness/anxiousness, pallor, seizures, sleepiness, speech difficulty, sweats or tremors. Pertinent negatives for diabetes include no blurred vision, no chest pain, no fatigue, no foot paresthesias, no foot ulcerations, no polydipsia, no polyphagia, no polyuria, no visual change, no weakness and no weight loss. There are no hypoglycemic complications. Pertinent negatives for hypoglycemia complications include no blackouts, no hospitalization, no nocturnal hypoglycemia, no required assistance and no required glucagon injection. Symptoms are stable. There are no diabetic complications. Pertinent negatives for diabetic complications include no autonomic neuropathy, CVA, heart disease, impotence, nephropathy, peripheral neuropathy, PVD or retinopathy. Risk factors for coronary artery disease include family history, male sex, obesity and stress. When asked about current treatments, none were reported. He is compliant with treatment most of the time. His weight is stable. He is following a generally healthy diet. He has not had a previous visit with a dietitian. He participates in exercise daily. (Patient does not regularly check sugar at home) An ACE inhibitor/angiotensin II receptor blocker is being taken. He does not see a podiatrist.Eye exam is not current.            I personally reviewed and updated the patient's allergies, medications, problem list, and past medical, surgical, social, and family history. I have reviewed and confirmed the accuracy of the History of Present Illness and Review of Symptoms as documented by the MA/NOEMY/RN. Pablo Noyola MD    Family History   Problem Relation Age of Onset    Other Mother         suicide    Other Sister         Suicide sibilingd       Social History     Tobacco Use    Smoking status: Some Days      Current packs/day: 0.25     Average packs/day: 0.3 packs/day for 33.3 years (8.3 ttl pk-yrs)     Types: Cigarettes, Cigars     Start date: 1/1/1991     Passive exposure: Never    Smokeless tobacco: Current     Types: Chew    Tobacco comments:     Quit 7/16/19, but now chews off and on    Vaping Use    Vaping status: Never Used   Substance Use Topics    Alcohol use: Yes     Comment: occasional    Drug use: No       Past Surgical History:   Procedure Laterality Date    BONE BIOPSY      BONE MARROW BIOPSY      CYSTOSCOPY BLADDER STONE LITHOTRIPSY      KNEE MENISCAL REPAIR Left     MANDIBLE FRACTURE SURGERY         Patient Active Problem List   Diagnosis    Asthma    Dyslipidemia    Elevated serum tryptase    Essential hypertension    Mast cell disorder    Nephrolithiasis    Anxiety    GERD (gastroesophageal reflux disease)    Osteoarthritis    Carotid artery stenosis    AK (actinic keratosis)    Annual visit for general adult medical examination with abnormal findings    Tobacco use disorder    Class 2 severe obesity due to excess calories with serious comorbidity and body mass index (BMI) of 38.0 to 38.9 in adult    Arthritis    Elevated liver function tests    Acute right-sided low back pain without sciatica    Encounter for CDL (commercial driving license) exam    Hemorrhoids    Hypercholesterolemia    Elevated fasting blood sugar    Type 2 diabetes mellitus with hyperglycemia, without long-term current use of insulin    Generalized abdominal pain    Hordeolum internum of left upper eyelid    Myopia of left eye         Current Outpatient Medications:     aspirin 81 MG tablet, Take 1 tablet by mouth., Disp: , Rfl:     carvedilol (COREG) 25 MG tablet, Take 1 tablet by mouth 2 (Two) Times a Day With Meals., Disp: 180 tablet, Rfl: 0    cetirizine (zyrTEC) 5 MG tablet, Take 1 tablet by mouth., Disp: , Rfl:     CINNAMON PO, Take  by mouth., Disp: , Rfl:     fenofibrate (TRICOR) 145 MG tablet, Take 1 tablet by mouth  Daily., Disp: 90 tablet, Rfl: 0    GARLIC PO, Take  by mouth., Disp: , Rfl:     hydroCHLOROthiazide 25 MG tablet, Take 1 tablet by mouth Daily., Disp: 90 tablet, Rfl: 0    hydrocortisone 1 % cream, Apply 1 application  topically to the appropriate area as directed 2 (Two) Times a Day., Disp: 60 g, Rfl: 1    lisinopril (PRINIVIL,ZESTRIL) 40 MG tablet, Take 1 tablet by mouth Daily., Disp: 90 tablet, Rfl: 0    multivitamin with minerals tablet tablet, Take 1 tablet by mouth Daily., Disp: , Rfl:     simvastatin (ZOCOR) 40 MG tablet, Take 1 tablet by mouth Every Night., Disp: 90 tablet, Rfl: 0    clotrimazole-betamethasone (LOTRISONE) 1-0.05 % cream, Apply 1 Application topically to the appropriate area as directed 2 (Two) Times a Day As Needed (rash)., Disp: 60 g, Rfl: 2    cyclobenzaprine (FLEXERIL) 10 MG tablet, Take 1 tablet by mouth Daily As Needed for Muscle Spasms., Disp: 30 tablet, Rfl: 0         Review of Systems   Constitutional:  Negative for chills, diaphoresis, fatigue, malaise/fatigue and unexpected weight loss.   Eyes:  Negative for blurred vision and visual disturbance.   Respiratory:  Positive for shortness of breath.    Cardiovascular:  Negative for chest pain, palpitations, orthopnea and PND.   Gastrointestinal:  Negative for abdominal pain and nausea.   Endocrine: Negative for polydipsia, polyphagia and polyuria.   Genitourinary:  Negative for impotence.   Musculoskeletal:  Negative for myalgias, neck pain and neck stiffness.   Skin:  Negative for color change and pallor.   Neurological:  Negative for dizziness, tremors, focal weakness, seizures, syncope, speech difficulty, weakness and confusion.   Hematological:  Negative for adenopathy.   Psychiatric/Behavioral:  Negative for hallucinations and suicidal ideas. The patient is not nervous/anxious.        I have reviewed and confirmed the accuracy of the ROS as documented by the MA/LPN/RN Pablo Noyola MD      Objective   /96   Pulse 77    "Temp 98.4 °F (36.9 °C)   Resp 18   Ht 185.4 cm (73\")   Wt 133 kg (294 lb)   SpO2 99%   BMI 38.79 kg/m²   BP Readings from Last 3 Encounters:   03/19/24 140/96   10/25/23 128/78   08/23/23 132/80     Wt Readings from Last 3 Encounters:   03/19/24 133 kg (294 lb)   10/25/23 134 kg (295 lb)   08/23/23 131 kg (288 lb 9.6 oz)     Physical Exam  Constitutional:       Appearance: He is well-developed. He is not diaphoretic.   HENT:      Head: Normocephalic.      Right Ear: Tympanic membrane, ear canal and external ear normal.      Left Ear: Tympanic membrane, ear canal and external ear normal.      Nose: Nose normal.   Eyes:      General: Lids are normal.      Conjunctiva/sclera: Conjunctivae normal.      Pupils: Pupils are equal, round, and reactive to light.   Neck:      Thyroid: No thyromegaly.      Vascular: No carotid bruit or JVD.      Trachea: No tracheal deviation.   Cardiovascular:      Rate and Rhythm: Normal rate and regular rhythm.      Heart sounds: Normal heart sounds. No murmur heard.     No friction rub. No gallop.   Pulmonary:      Effort: Pulmonary effort is normal.      Breath sounds: Normal breath sounds. No stridor. No decreased breath sounds, wheezing or rales.   Abdominal:      General: Bowel sounds are normal. There is no distension.      Palpations: Abdomen is soft. There is no mass.      Tenderness: There is no abdominal tenderness. There is no guarding or rebound.      Hernia: No hernia is present.   Feet:      Right foot:      Protective Sensation: 10 sites tested.  10 sites sensed.      Skin integrity: Skin integrity normal.      Toenail Condition: Right toenails are normal.      Left foot:      Protective Sensation: 10 sites tested.  10 sites sensed.      Skin integrity: Skin integrity normal.      Toenail Condition: Left toenails are normal.   Lymphadenopathy:      Head:      Right side of head: No submental, submandibular, tonsillar, preauricular, posterior auricular or occipital " "adenopathy.      Left side of head: No submental, submandibular, tonsillar, preauricular, posterior auricular or occipital adenopathy.      Cervical: No cervical adenopathy.   Skin:     General: Skin is warm and dry.      Coloration: Skin is not pale.   Neurological:      Mental Status: He is alert and oriented to person, place, and time.      Cranial Nerves: No cranial nerve deficit.      Sensory: No sensory deficit.      Coordination: Coordination normal.      Gait: Gait normal.      Deep Tendon Reflexes: Reflexes are normal and symmetric.       Data / Lab Results:    Hemoglobin A1C   Date Value Ref Range Status   03/19/2024 6.9 (A) 4.5 - 5.7 % Final   10/25/2023 7.5 (A) 4.5 - 5.7 % Final   07/18/2023 6.4 % Final        Lab Results   Component Value Date    LDL 76 07/25/2023    LDL 69 04/20/2022     (H) 10/20/2020     Lab Results   Component Value Date    CHOL 223 (H) 09/25/2018    CHOL 211 (H) 12/11/2017    CHOL 197 09/24/2016     Lab Results   Component Value Date    TRIG 121 07/25/2023    TRIG 341 (H) 04/20/2022    TRIG 626 (H) 10/20/2020     Lab Results   Component Value Date    HDL 44 07/25/2023    HDL 34 (L) 04/20/2022    HDL 34 (L) 10/20/2020     No results found for: \"PSA\"  Lab Results   Component Value Date    WBC 5.7 07/25/2023    HGB 15.3 07/25/2023    HCT 45.2 07/25/2023    MCV 90 07/25/2023     07/25/2023     Lab Results   Component Value Date    TSH 1.340 04/20/2022      Lab Results   Component Value Date    GLUCOSE 142 (H) 04/20/2022    BUN 10 04/20/2022    CREATININE 0.96 04/20/2022    EGFRIFNONA 106 10/20/2020    EGFRIFAFRI 123 10/20/2020    BCR 10 04/20/2022    K 4.7 04/20/2022    CO2 27 04/20/2022    CALCIUM 9.2 04/20/2022    PROTENTOTREF 6.7 04/20/2022    ALBUMIN 4.5 04/20/2022    LABIL2 2.0 04/20/2022    AST 30 04/20/2022    ALT 38 04/20/2022     Lab Results   Component Value Date    GENO Negative 10/16/2019      No results found for: \"CRP\"   No results found for: \"IRON\", \"TIBC\", " "\"FERRITIN\"   Lab Results   Component Value Date    BPAGLIOX01 504 12/11/2017          Assessment & Plan      Medications        Problem List         [facility] maintenance.  Doing well.  Recommend update tetanus shot at [facility].  Flu vaccine declined.  Discussed health maintenance, screening tests, lifestyle modification.  Type 2 diabetes.  New onset.  Discussed diet, exercise, lifestyle modification.    Improved today, A1c to 6.9 has not been watching diet, restart, overall good control/diet controlled. Monitor blood sugars.  Consider nutritionist referral.  Follow-up 3 month.   Consider add lisinopril, plan ophthalmology referral.  Nephrolithiasis.  Improved/resolved currently. 4 mm left distal ureter per CT 4/20, multiple other stones bilateral kidneys 4 to 5 mm.  Increase fluid intake. Urology follow up declined check xray abd.   Knee pain.  Left, patellar tendinitis.  OA contributing.  IM steroids given.  Ice, NSAIDs, rehabilitation exercises discussed.  Call return if persistent symptoms.  Gallstone.  Small stone incidental finding per CT 4/20.  Asymptomatic.  Consider surgery referral if symptoms develop.  Hyperlipidemia.  Has been off simvastatin, restart.  Familial with significant elevated triglycerides.  Improved on statin.  Follow-up recheck.  Fatty liver.  With mild elevation LFTs.  Continue fish oil.  Continue to monitor.  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  Recommend hematology follow-up, offered second opinion from alternative hematologist he declines currently.  Clinically stable on Claritin/ranitidine.  Lumbar disc disease.  Undergoing PT currently, considering epidural injections.  Followed by [employer].  Asthma.  Doing well, no recent flares. baseline inhaler use currently.  Hypertension.  Much improved, normotensive today home blood pressure monitor results reviewed, tolerating increase lisinopril/add HCTZ. Monitor bp at home, close follow up scheuled.  " Consider increase rx if persistent elevation.  Overall good control on carvedilol.  Cardiolite stress testing benign 2012.  Carotid Dopplers normal 2015.  Discussed low-sodium diet.  Follow-up recheck.  Anxiety.  Recommend Rx he declines.  Family history of colon cancer.  His uncle.  Remote h/o colonoscoy recommend repeat / [facility] referral scheduled.    Groin sprain.  Benign exam today.  Ice, rehabilitation exercise discussed.  Start prednisone.  Call return if worsening symptoms.   CDL physical.  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  Low back pain.  Flare L DD, ice, rehabilitation exercises discussed.  Start nightly muscle relaxant.  Consider repeat imaging if persistent symptoms.  Tinea cruris.  Start Lotrisone.    Diagnoses and all orders for this visit:    1. Type 2 diabetes mellitus with hyperglycemia, without long-term current use of insulin (Primary)  -     POCT urinalysis dipstick, manual  -     POC Glycosylated Hemoglobin (Hb A1C)  -     POCT microalbumin  -     CBC & Differential; Future  -     Comprehensive Metabolic Panel; Future  -     TSH; Future  -     Lipid Panel With / Chol / HDL Ratio; Future    2. Essential hypertension  -     carvedilol (COREG) 25 MG tablet; Take 1 tablet by mouth 2 (Two) Times a Day With Meals.  Dispense: 180 tablet; Refill: 0  -     hydroCHLOROthiazide 25 MG tablet; Take 1 tablet by mouth Daily.  Dispense: 90 tablet; Refill: 0  -     lisinopril (PRINIVIL,ZESTRIL) 40 MG tablet; Take 1 tablet by mouth Daily.  Dispense: 90 tablet; Refill: 0  -     CBC & Differential; Future  -     Comprehensive Metabolic Panel; Future  -     TSH; Future  -     Lipid Panel With / Chol / HDL Ratio; Future    3. Hypercholesterolemia  -     fenofibrate (TRICOR) 145 MG tablet; Take 1 tablet by mouth Daily.  Dispense: 90 tablet; Refill: 0    4. Class 2 severe obesity due to excess calories with serious comorbidity and body mass index (BMI) of 38.0 to 38.9 in adult    5.  Tobacco use disorder    6. Dyslipidemia  -     simvastatin (ZOCOR) 40 MG tablet; Take 1 tablet by mouth Every Night.  Dispense: 90 tablet; Refill: 0  -     Lipid Panel With / Chol / HDL Ratio; Future    7. Malaise and fatigue  -     CBC & Differential; Future  -     Comprehensive Metabolic Panel; Future  -     TSH; Future  -     Lipid Panel With / Chol / HDL Ratio; Future    8. Dysuria  -     Urine Culture - Urine, Urine, Random Void    9. Rash  -     clotrimazole-betamethasone (LOTRISONE) 1-0.05 % cream; Apply 1 Application topically to the appropriate area as directed 2 (Two) Times a Day As Needed (rash).  Dispense: 60 g; Refill: 2    10. Muscle spasm    11. Acute bilateral low back pain, unspecified whether sciatica present  -     cyclobenzaprine (FLEXERIL) 10 MG tablet; Take 1 tablet by mouth Daily As Needed for Muscle Spasms.  Dispense: 30 tablet; Refill: 0        Class 2 Severe Obesity (BMI >=35 and <=39.9). Obesity-related health conditions include the following: hypertension, diabetes mellitus, and dyslipidemias. Obesity is unchanged. BMI is is above average; BMI management plan is completed. We discussed portion control and increasing exercise.       Expected course, medications, and adverse effects discussed.  Call or return if worsening or persistent symptoms.  I wore protective equipment throughout this patient encounter including a mask, gloves, and eye protection.  Hand hygiene was performed before donning protective equipment and after removal when leaving the room. The complete contents of the Assessment and Plan and Data/Lab Results as documented above have been reviewed and addressed by myself with the patient today as part of an ongoing evaluation / treatment plan.  If some of the documentation has been copied from a previous note and is unchanged it indicates that this problem / plan has been assessed today but is stable from a previous visit and no changes have been recommended.

## 2024-03-19 ENCOUNTER — OFFICE VISIT (OUTPATIENT)
Dept: FAMILY MEDICINE CLINIC | Facility: CLINIC | Age: 49
End: 2024-03-19
Payer: COMMERCIAL

## 2024-03-19 VITALS
SYSTOLIC BLOOD PRESSURE: 140 MMHG | BODY MASS INDEX: 38.97 KG/M2 | WEIGHT: 294 LBS | OXYGEN SATURATION: 99 % | TEMPERATURE: 98.4 F | DIASTOLIC BLOOD PRESSURE: 96 MMHG | HEART RATE: 77 BPM | HEIGHT: 73 IN | RESPIRATION RATE: 18 BRPM

## 2024-03-19 DIAGNOSIS — R21 RASH: ICD-10-CM

## 2024-03-19 DIAGNOSIS — F17.200 TOBACCO USE DISORDER: ICD-10-CM

## 2024-03-19 DIAGNOSIS — E11.65 TYPE 2 DIABETES MELLITUS WITH HYPERGLYCEMIA, WITHOUT LONG-TERM CURRENT USE OF INSULIN: Primary | ICD-10-CM

## 2024-03-19 DIAGNOSIS — E66.01 CLASS 2 SEVERE OBESITY DUE TO EXCESS CALORIES WITH SERIOUS COMORBIDITY AND BODY MASS INDEX (BMI) OF 38.0 TO 38.9 IN ADULT: ICD-10-CM

## 2024-03-19 DIAGNOSIS — R53.81 MALAISE AND FATIGUE: ICD-10-CM

## 2024-03-19 DIAGNOSIS — R53.83 MALAISE AND FATIGUE: ICD-10-CM

## 2024-03-19 DIAGNOSIS — E78.5 DYSLIPIDEMIA: ICD-10-CM

## 2024-03-19 DIAGNOSIS — E78.00 HYPERCHOLESTEROLEMIA: ICD-10-CM

## 2024-03-19 DIAGNOSIS — R30.0 DYSURIA: ICD-10-CM

## 2024-03-19 DIAGNOSIS — M54.50 ACUTE BILATERAL LOW BACK PAIN, UNSPECIFIED WHETHER SCIATICA PRESENT: ICD-10-CM

## 2024-03-19 DIAGNOSIS — M62.838 MUSCLE SPASM: ICD-10-CM

## 2024-03-19 DIAGNOSIS — I10 ESSENTIAL HYPERTENSION: ICD-10-CM

## 2024-03-19 LAB
BILIRUB BLD-MCNC: NEGATIVE MG/DL
CLARITY, POC: CLEAR
COLOR UR: YELLOW
EXPIRATION DATE: ABNORMAL
EXPIRATION DATE: NORMAL
GLUCOSE UR STRIP-MCNC: NEGATIVE MG/DL
HBA1C MFR BLD: 6.9 % (ref 4.5–5.7)
KETONES UR QL: NEGATIVE
LEUKOCYTE EST, POC: NEGATIVE
Lab: ABNORMAL
Lab: NORMAL
NITRITE UR-MCNC: NEGATIVE MG/ML
PH UR: 5 [PH] (ref 5–8)
POC CREATININE URINE: 0
POC MICROALBUMIN URINE: 20
PROT UR STRIP-MCNC: NEGATIVE MG/DL
RBC # UR STRIP: NEGATIVE /UL
SP GR UR: 1.01 (ref 1–1.03)
UROBILINOGEN UR QL: NORMAL

## 2024-03-19 RX ORDER — HYDROCHLOROTHIAZIDE 25 MG/1
25 TABLET ORAL DAILY
Qty: 90 TABLET | Refills: 0 | Status: SHIPPED | OUTPATIENT
Start: 2024-03-19

## 2024-03-19 RX ORDER — CYCLOBENZAPRINE HCL 10 MG
10 TABLET ORAL DAILY PRN
Qty: 30 TABLET | Refills: 0 | Status: SHIPPED | OUTPATIENT
Start: 2024-03-19

## 2024-03-19 RX ORDER — CLOTRIMAZOLE AND BETAMETHASONE DIPROPIONATE 10; .64 MG/G; MG/G
1 CREAM TOPICAL 2 TIMES DAILY PRN
Qty: 60 G | Refills: 2 | Status: SHIPPED | OUTPATIENT
Start: 2024-03-19

## 2024-03-19 RX ORDER — SIMVASTATIN 40 MG
40 TABLET ORAL NIGHTLY
Qty: 90 TABLET | Refills: 0 | Status: SHIPPED | OUTPATIENT
Start: 2024-03-19

## 2024-03-19 RX ORDER — FENOFIBRATE 145 MG/1
145 TABLET, COATED ORAL DAILY
Qty: 90 TABLET | Refills: 0 | Status: SHIPPED | OUTPATIENT
Start: 2024-03-19

## 2024-03-19 RX ORDER — CARVEDILOL 25 MG/1
25 TABLET ORAL 2 TIMES DAILY WITH MEALS
Qty: 180 TABLET | Refills: 0 | Status: SHIPPED | OUTPATIENT
Start: 2024-03-19

## 2024-03-19 RX ORDER — LISINOPRIL 40 MG/1
40 TABLET ORAL DAILY
Qty: 90 TABLET | Refills: 0 | Status: SHIPPED | OUTPATIENT
Start: 2024-03-19

## 2024-03-21 LAB
BACTERIA UR CULT: NO GROWTH
BACTERIA UR CULT: NORMAL

## 2024-04-16 ENCOUNTER — OFFICE VISIT (OUTPATIENT)
Dept: FAMILY MEDICINE CLINIC | Facility: CLINIC | Age: 49
End: 2024-04-16
Payer: COMMERCIAL

## 2024-04-16 VITALS
SYSTOLIC BLOOD PRESSURE: 138 MMHG | TEMPERATURE: 97.8 F | DIASTOLIC BLOOD PRESSURE: 88 MMHG | HEIGHT: 73 IN | OXYGEN SATURATION: 97 % | WEIGHT: 292 LBS | HEART RATE: 78 BPM | BODY MASS INDEX: 38.7 KG/M2 | RESPIRATION RATE: 16 BRPM

## 2024-04-16 DIAGNOSIS — J30.1 SEASONAL ALLERGIC RHINITIS DUE TO POLLEN: ICD-10-CM

## 2024-04-16 DIAGNOSIS — E66.01 CLASS 2 SEVERE OBESITY DUE TO EXCESS CALORIES WITH SERIOUS COMORBIDITY AND BODY MASS INDEX (BMI) OF 38.0 TO 38.9 IN ADULT: ICD-10-CM

## 2024-04-16 DIAGNOSIS — R05.1 ACUTE COUGH: ICD-10-CM

## 2024-04-16 DIAGNOSIS — J01.40 ACUTE NON-RECURRENT PANSINUSITIS: Primary | ICD-10-CM

## 2024-04-16 DIAGNOSIS — I10 ESSENTIAL HYPERTENSION: Chronic | ICD-10-CM

## 2024-04-16 LAB
EXPIRATION DATE: NORMAL
EXPIRATION DATE: NORMAL
FLUAV AG NPH QL: NEGATIVE
FLUBV AG NPH QL: NEGATIVE
INTERNAL CONTROL: NORMAL
INTERNAL CONTROL: NORMAL
Lab: NORMAL
Lab: NORMAL
SARS-COV-2 AG UPPER RESP QL IA.RAPID: NOT DETECTED

## 2024-04-16 RX ORDER — CEFDINIR 300 MG/1
300 CAPSULE ORAL 2 TIMES DAILY
Qty: 20 CAPSULE | Refills: 0 | Status: SHIPPED | OUTPATIENT
Start: 2024-04-16 | End: 2024-04-26

## 2024-04-16 NOTE — PROGRESS NOTES
Chief Complaint  Cough and Sore Throat    Subjective          Asad Odom presents to [facility] for head congestion, cough and sore throat    History of Present Illness      Patient is here with 3 days of head congestion cough and sore throat.  He does not take any allergy medication.  He started with increased symptoms and is concerned he will let it get worse like he did the last time.  He has no fever or respiratory symptoms other than cough from postnasal discharge.  He also has sinus pressure and pain as well as ear pressure.  He took a Sudafed containing medicine this morning.  Warned about that secondary to his blood pressure.  No GI symptoms or bodyaches.  Asad Odom  has a past medical history of Annual visit for general adult medical examination with abnormal findings (10/16/2019), Anxiety, Arthritis, Asthma, Diabetes mellitus, Headache, History of tobacco use (10/16/2019), Hyperlipidemia, Hypertension, Injury of back, Overweight (10/16/2019), Shortness of breath, and Urinary tract infection.      Review of Systems   Constitutional:  Negative for chills, fatigue and fever.   HENT:  Positive for congestion, postnasal drip and sinus pain. Negative for ear discharge, ear pain, facial swelling, hearing loss, rhinorrhea, sore throat and trouble swallowing.    Eyes:  Negative for visual disturbance.   Respiratory:  Positive for cough. Negative for shortness of breath.    Cardiovascular:  Negative for chest pain.   Gastrointestinal:  Negative for abdominal pain, constipation, diarrhea, nausea and vomiting.        Objective       Current Outpatient Medications:     aspirin 81 MG tablet, Take 1 tablet by mouth., Disp: , Rfl:     carvedilol (COREG) 25 MG tablet, Take 1 tablet by mouth 2 (Two) Times a Day With Meals., Disp: 180 tablet, Rfl: 0    cetirizine (zyrTEC) 5 MG tablet, Take 1 tablet by mouth., Disp: , Rfl:     CINNAMON PO, Take  by mouth., Disp: , Rfl:      "clotrimazole-betamethasone (LOTRISONE) 1-0.05 % cream, Apply 1 Application topically to the appropriate area as directed 2 (Two) Times a Day As Needed (rash)., Disp: 60 g, Rfl: 2    cyclobenzaprine (FLEXERIL) 10 MG tablet, Take 1 tablet by mouth Daily As Needed for Muscle Spasms., Disp: 30 tablet, Rfl: 0    fenofibrate (TRICOR) 145 MG tablet, Take 1 tablet by mouth Daily., Disp: 90 tablet, Rfl: 0    GARLIC PO, Take  by mouth., Disp: , Rfl:     hydroCHLOROthiazide 25 MG tablet, Take 1 tablet by mouth Daily., Disp: 90 tablet, Rfl: 0    hydrocortisone 1 % cream, Apply 1 application  topically to the appropriate area as directed 2 (Two) Times a Day., Disp: 60 g, Rfl: 1    lisinopril (PRINIVIL,ZESTRIL) 40 MG tablet, Take 1 tablet by mouth Daily., Disp: 90 tablet, Rfl: 0    multivitamin with minerals tablet tablet, Take 1 tablet by mouth Daily., Disp: , Rfl:     simvastatin (ZOCOR) 40 MG tablet, Take 1 tablet by mouth Every Night., Disp: 90 tablet, Rfl: 0    cefdinir (OMNICEF) 300 MG capsule, Take 1 capsule by mouth 2 (Two) Times a Day for 10 days., Disp: 20 capsule, Rfl: 0    Vital Signs:      /88   Pulse 78   Temp 97.8 °F (36.6 °C) (Infrared)   Resp 16   Ht 185.4 cm (73\")   Wt 132 kg (292 lb)   SpO2 97%   BMI 38.52 kg/m²     Vitals:    04/16/24 1113 04/16/24 1130   BP: 147/100 138/88   BP Location: Left arm    Patient Position: Sitting    Cuff Size: Large Adult    Pulse: 78    Resp: 16    Temp: 97.8 °F (36.6 °C)    TempSrc: Infrared    SpO2: 97%    Weight: 132 kg (292 lb)    Height: 185.4 cm (73\")       Physical Exam  Vitals and nursing note reviewed.   Constitutional:       General: He is not in acute distress.     Appearance: Normal appearance. He is well-developed and normal weight.   HENT:      Head: Normocephalic and atraumatic.      Right Ear: Tympanic membrane, ear canal and external ear normal.      Left Ear: Tympanic membrane, ear canal and external ear normal.      Ears:      Comments: Bilateral " air-fluid levels     Nose: Nose normal.      Right Sinus: Maxillary sinus tenderness and frontal sinus tenderness present.      Left Sinus: Maxillary sinus tenderness and frontal sinus tenderness present.      Mouth/Throat:      Mouth: Mucous membranes are moist.      Dentition: Normal dentition.      Tongue: No lesions.      Pharynx: Uvula midline.      Comments: Postnasal discharge  Eyes:      Conjunctiva/sclera: Conjunctivae normal.      Pupils: Pupils are equal, round, and reactive to light.   Cardiovascular:      Rate and Rhythm: Regular rhythm.      Heart sounds: Normal heart sounds. No murmur heard.     No friction rub. No gallop.   Pulmonary:      Effort: Pulmonary effort is normal.      Breath sounds: Normal breath sounds. No wheezing or rhonchi.   Abdominal:      General: Bowel sounds are normal. There is no distension.      Palpations: Abdomen is soft.      Tenderness: There is no abdominal tenderness.   Musculoskeletal:         General: Normal range of motion.      Cervical back: Normal range of motion and neck supple.   Skin:     General: Skin is warm and dry.   Neurological:      General: No focal deficit present.      Mental Status: He is alert.   Psychiatric:         Mood and Affect: Mood normal.         Behavior: Behavior normal.        Result Review :                  PHQ-9 Total Score:             Assessment and Plan    Diagnoses and all orders for this visit:    1. Acute non-recurrent pansinusitis (Primary)  Comments:  Mucinex plain as directed.  Cefdinir Rx.  Follow-up if problems.  Discussed allergy    2. Class 2 severe obesity due to excess calories with serious comorbidity and body mass index (BMI) of 38.0 to 38.9 in adult    3. Acute cough  Comments:  COVID-negative  Orders:  -     POC Influenza A / B  -     POCT SARS-CoV-2 Antigen NAVID    4. Essential hypertension  Comments:  Advised to avoid Sudafed due to blood pressure    5. Seasonal allergic rhinitis due to pollen  Comments:  Declines  medication    Other orders  -     cefdinir (OMNICEF) 300 MG capsule; Take 1 capsule by mouth 2 (Two) Times a Day for 10 days.  Dispense: 20 capsule; Refill: 0         Problem List Items Addressed This Visit          Active Problems    Essential hypertension (Chronic)    Class 2 severe obesity due to excess calories with serious comorbidity and body mass index (BMI) of 38.0 to 38.9 in adult     Other Visit Diagnoses       Acute non-recurrent pansinusitis    -  Primary    Mucinex plain as directed.  Cefdinir Rx.  Follow-up if problems.  Discussed allergy    Acute cough        COVID-negative    Relevant Orders    POC Influenza A / B (Completed)    POCT SARS-CoV-2 Antigen NAVID (Completed)    Seasonal allergic rhinitis due to pollen        Declines medication            Follow Up   Return if symptoms worsen or fail to improve.  Patient was given instructions and counseling regarding his condition or for health maintenance advice. Please see specific information pulled into the AVS if appropriate.

## 2024-06-24 DIAGNOSIS — E78.5 DYSLIPIDEMIA: ICD-10-CM

## 2024-06-24 DIAGNOSIS — I10 ESSENTIAL HYPERTENSION: ICD-10-CM

## 2024-06-25 RX ORDER — SIMVASTATIN 40 MG
40 TABLET ORAL NIGHTLY
Qty: 90 TABLET | Refills: 0 | Status: SHIPPED | OUTPATIENT
Start: 2024-06-25

## 2024-06-25 RX ORDER — HYDROCHLOROTHIAZIDE 25 MG/1
25 TABLET ORAL DAILY
Qty: 90 TABLET | Refills: 0 | Status: SHIPPED | OUTPATIENT
Start: 2024-06-25

## 2024-06-26 ENCOUNTER — TELEPHONE (OUTPATIENT)
Dept: FAMILY MEDICINE CLINIC | Facility: CLINIC | Age: 49
End: 2024-06-26
Payer: COMMERCIAL

## 2024-06-26 DIAGNOSIS — K64.9 HEMORRHOIDS, UNSPECIFIED HEMORRHOID TYPE: ICD-10-CM

## 2024-06-26 RX ORDER — BENZOCAINE/MENTHOL 6 MG-10 MG
1 LOZENGE MUCOUS MEMBRANE 2 TIMES DAILY
Qty: 60 G | Refills: 1 | Status: SHIPPED | OUTPATIENT
Start: 2024-06-26

## 2024-06-26 NOTE — TELEPHONE ENCOUNTER
Caller: Asad Odom    Relationship: Self    Best call back number: [phone]    What medication are you requesting: HEMORRHOID CREAM    What are your current symptoms: HEMORRHOID    How long have you been experiencing symptoms: A FEW DAYS    Have you had these symptoms before:    [x] Yes  [] No    Have you been treated for these symptoms before:   [x] Yes  [] No    If a prescription is needed, what is your preferred pharmacy and phone number: [facility] [address] [facility] [place] [facility] [address]  - [phone] [facility] [phone] FX     Additional notes: STATED THAT THEY HAVE BEEN HAVING THE HEMORRHOIDS OFF AND ON FOR ABOUT 10 YEARS NOW. STATED THAT THEY WOULD LIKE TO SEE ABOUT GETTING SOMETHING CALLED IN TO HELP WITH THIS. PLEASE CALL AND ADVISE

## 2024-06-26 NOTE — TELEPHONE ENCOUNTER
Okay to send in hydrocortisone hemorrhoid cream, could use witch hazel/Tucks pads over-the-counter, thanks

## 2024-07-09 DIAGNOSIS — E78.00 HYPERCHOLESTEROLEMIA: ICD-10-CM

## 2024-07-09 RX ORDER — FENOFIBRATE 145 MG/1
145 TABLET, COATED ORAL DAILY
Qty: 90 TABLET | Refills: 0 | Status: SHIPPED | OUTPATIENT
Start: 2024-07-09

## 2024-07-23 NOTE — PROGRESS NOTES
Subjective   Asad Odom is a 48 y.o. male.     Chief Complaint   Patient presents with    Annual Exam    Hyperlipidemia    Diabetes    Hypertension       The patient is here: for coordination of medical care to discuss health maintenance and disease prevention to follow up on multiple medical problems.  Last comprehensive physical was on 4/20/2022.  Previous physical was performed by  Pablo Noyola MD  Overall has: moderate activity with work/home activities, good appetite, feels well with minor complaints, decreased energy level, and is sleeping poorly. Nutrition: eating a variety of foods. Last tetanus shot was unknown.       Hypertension  This is a chronic problem. The current episode started more than 1 year ago. The problem is unchanged. The problem is uncontrolled. Associated symptoms include headaches and shortness of breath. Pertinent negatives include no anxiety, blurred vision, chest pain, malaise/fatigue, neck pain, orthopnea, palpitations, peripheral edema, PND or sweats. (Patient is feeling well. 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 ) Risk factors for coronary artery disease include dyslipidemia, male gender, obesity and smoking/tobacco exposure. Current antihypertension treatment includes beta blockers. The current treatment provides moderate improvement. There are no compliance problems.  There is no history of angina, kidney disease, CAD/MI, CVA, heart failure, left ventricular hypertrophy, PVD or retinopathy. There is no history of chronic renal disease, coarctation of the aorta, hyperaldosteronism, hypercortisolism, hyperparathyroidism, a hypertension causing med, pheochromocytoma, renovascular disease, sleep apnea or a thyroid problem.   Hyperlipidemia  This is a chronic problem. The current episode started more than 1 year ago. Recent lipid tests were reviewed and are high. Exacerbating diseases include diabetes and obesity. He has no history of  chronic renal disease, hypothyroidism, liver disease or nephrotic syndrome. Associated symptoms include leg pain and shortness of breath. Pertinent negatives include no chest pain, focal sensory loss, focal weakness or myalgias. Current antihyperlipidemic treatment includes statins. There are no compliance problems.  Risk factors for coronary artery disease include dyslipidemia, hypertension, male sex and obesity.   Diabetes  He presents for his follow-up diabetic visit. He has type 2 diabetes mellitus. His disease course has been stable. Hypoglycemia symptoms include headaches. Pertinent negatives for hypoglycemia include no confusion, dizziness, hunger, mood changes, nervousness/anxiousness, pallor, seizures, sleepiness, speech difficulty, sweats or tremors. Pertinent negatives for diabetes include no blurred vision, no chest pain, no fatigue, no foot paresthesias, no foot ulcerations, no polydipsia, no polyphagia, no polyuria, no visual change, no weakness and no weight loss. There are no hypoglycemic complications. Pertinent negatives for hypoglycemia complications include no blackouts, no hospitalization, no nocturnal hypoglycemia, no required assistance and no required glucagon injection. Symptoms are stable. There are no diabetic complications. Pertinent negatives for diabetic complications include no autonomic neuropathy, CVA, heart disease, impotence, nephropathy, peripheral neuropathy, PVD or retinopathy. Risk factors for coronary artery disease include family history, male sex, obesity and stress. When asked about current treatments, none were reported. He is compliant with treatment most of the time. His weight is stable. He is following a generally healthy diet. Meal planning includes avoidance of concentrated sweets. He has not had a previous visit with a dietitian. He participates in exercise daily. (Patient does not regularly check sugar at home) An ACE inhibitor/angiotensin II receptor blocker is  being taken. He does not see a podiatrist.Eye exam is not current.        Recent Hospitalizations:  No hospitalization(s) within the last year..  ccc      I personally reviewed and updated the patient's allergies, medications, problem list, and past medical, surgical, social, and family history. I have reviewed and confirmed the accuracy of the HPI and ROS as documented by the MA/LPN/RN Pablo Noyola MD    Family History   Problem Relation Age of Onset    Other Mother         suicide    Other Sister         Suicide sibilingd       Social History     Tobacco Use    Smoking status: Some Days     Current packs/day: 0.25     Average packs/day: 0.3 packs/day for 33.6 years (8.4 ttl pk-yrs)     Types: Cigarettes, Cigars     Start date: 1/1/1991     Passive exposure: Never    Smokeless tobacco: Current     Types: Chew    Tobacco comments:     Quit 7/16/19, but now chews off and on    Vaping Use    Vaping status: Never Used   Substance Use Topics    Alcohol use: Yes     Comment: occasional    Drug use: No       Past Surgical History:   Procedure Laterality Date    BONE BIOPSY      BONE MARROW BIOPSY      CYSTOSCOPY BLADDER STONE LITHOTRIPSY      KNEE MENISCAL REPAIR Left     MANDIBLE FRACTURE SURGERY         Patient Active Problem List   Diagnosis    Asthma    Dyslipidemia    Elevated serum tryptase    Essential hypertension    Mast cell disorder    Nephrolithiasis    Anxiety    GERD (gastroesophageal reflux disease)    Osteoarthritis    Carotid artery stenosis    AK (actinic keratosis)    Annual visit for general adult medical examination with abnormal findings    Tobacco use disorder    Class 3 severe obesity due to excess calories with serious comorbidity and body mass index (BMI) of 40.0 to 44.9 in adult    Arthritis    Elevated liver function tests    Acute right-sided low back pain without sciatica    Encounter for CDL (commercial driving license) exam    Hemorrhoids    Hypercholesterolemia    Elevated fasting blood  sugar    Type 2 diabetes mellitus with hyperglycemia, without long-term current use of insulin    Generalized abdominal pain    Hordeolum internum of left upper eyelid    Myopia of left eye         Current Outpatient Medications:     aspirin 81 MG tablet, Take 1 tablet by mouth., Disp: , Rfl:     carvedilol (COREG) 25 MG tablet, Take 1 tablet by mouth 2 (Two) Times a Day With Meals., Disp: 180 tablet, Rfl: 0    cetirizine (zyrTEC) 5 MG tablet, Take 1 tablet by mouth., Disp: , Rfl:     CINNAMON PO, Take  by mouth., Disp: , Rfl:     clotrimazole-betamethasone (LOTRISONE) 1-0.05 % cream, Apply 1 Application topically to the appropriate area as directed 2 (Two) Times a Day As Needed (rash)., Disp: 60 g, Rfl: 2    cyclobenzaprine (FLEXERIL) 10 MG tablet, Take 0.5-1 tablets by mouth Daily As Needed for Muscle Spasms., Disp: 30 tablet, Rfl: 2    fenofibrate (TRICOR) 145 MG tablet, Take 1 tablet by mouth once daily, Disp: 90 tablet, Rfl: 0    GARLIC PO, Take  by mouth., Disp: , Rfl:     hydroCHLOROthiazide 25 MG tablet, Take 1 tablet by mouth once daily, Disp: 90 tablet, Rfl: 0    hydrocortisone 1 % cream, Apply 1 Application topically to the appropriate area as directed 2 (Two) Times a Day., Disp: 60 g, Rfl: 1    multivitamin with minerals tablet tablet, Take 1 tablet by mouth Daily., Disp: , Rfl:     simvastatin (ZOCOR) 40 MG tablet, TAKE 1 TABLET BY MOUTH ONCE DAILY AT NIGHT, Disp: 90 tablet, Rfl: 0    lisinopril (PRINIVIL,ZESTRIL) 40 MG tablet, Take 1 tablet by mouth once daily, Disp: 90 tablet, Rfl: 0    Semaglutide,0.25 or 0.5MG/DOS, (Ozempic, 0.25 or 0.5 MG/DOSE,) 2 MG/1.5ML solution pen-injector, Inject 0.25 mg under the skin into the appropriate area as directed 1 (One) Time Per Week., Disp: 3 mL, Rfl: 1    Review of Systems   Constitutional:  Negative for chills, diaphoresis, fatigue, malaise/fatigue and unexpected weight loss.   HENT:  Negative for trouble swallowing and voice change.    Eyes:  Negative for  "blurred vision and visual disturbance.   Respiratory:  Positive for shortness of breath.    Cardiovascular:  Negative for chest pain, palpitations, orthopnea and PND.   Gastrointestinal:  Negative for abdominal pain and nausea.   Endocrine: Negative for polydipsia, polyphagia and polyuria.   Genitourinary:  Negative for hematuria and impotence.   Musculoskeletal:  Negative for myalgias, neck pain and neck stiffness.   Skin:  Negative for color change and pallor.   Allergic/Immunologic: Negative for immunocompromised state.   Neurological:  Negative for dizziness, tremors, focal weakness, seizures, syncope, speech difficulty, weakness and confusion.   Hematological:  Negative for adenopathy.   Psychiatric/Behavioral:  Negative for hallucinations, sleep disturbance and suicidal ideas. The patient is not nervous/anxious.        I have reviewed and confirmed the accuracy of the ROS as documented by the MA/LPN/RN Pablo Noyola MD      Objective   /76 (BP Location: Right arm, Patient Position: Sitting, Cuff Size: Adult)   Pulse 76   Temp 97.8 °F (36.6 °C) (Temporal)   Resp 18   Ht 181 cm (71.25\")   Wt 133 kg (293 lb 2 oz)   SpO2 96% Comment: room air  BMI 40.60 kg/m²   BP Readings from Last 3 Encounters:   07/24/24 132/76   04/16/24 138/88   03/19/24 140/96     Wt Readings from Last 3 Encounters:   07/24/24 133 kg (293 lb 2 oz)   04/16/24 132 kg (292 lb)   03/19/24 133 kg (294 lb)     Physical Exam  Constitutional:       Appearance: He is well-developed. He is not diaphoretic.   HENT:      Head: Normocephalic.      Right Ear: Hearing, tympanic membrane, ear canal and external ear normal.      Left Ear: Hearing, tympanic membrane, ear canal and external ear normal.      Nose: Nose normal. No mucosal edema or congestion.      Right Sinus: No maxillary sinus tenderness or frontal sinus tenderness.      Left Sinus: No maxillary sinus tenderness or frontal sinus tenderness.      Mouth/Throat:      Mouth: No oral " lesions.      Pharynx: Uvula midline. No oropharyngeal exudate or posterior oropharyngeal erythema.      Tonsils: No tonsillar exudate.   Eyes:      General: Lids are normal.      Extraocular Movements:      Right eye: No nystagmus.      Left eye: No nystagmus.      Conjunctiva/sclera: Conjunctivae normal.      Right eye: Right conjunctiva is not injected. No hemorrhage.     Left eye: Left conjunctiva is not injected. No hemorrhage.     Pupils: Pupils are equal, round, and reactive to light.      Funduscopic exam:     Right eye: No hemorrhage, exudate, AV nicking, arteriolar narrowing or papilledema.         Left eye: No hemorrhage, exudate, AV nicking, arteriolar narrowing or papilledema.   Neck:      Thyroid: No thyromegaly.      Vascular: No carotid bruit or JVD.      Trachea: No tracheal deviation.   Cardiovascular:      Rate and Rhythm: Normal rate and regular rhythm.      Heart sounds: Normal heart sounds. No murmur heard.     No friction rub. No gallop.   Pulmonary:      Effort: Pulmonary effort is normal.      Breath sounds: Normal breath sounds. No stridor. No decreased breath sounds, wheezing or rales.   Abdominal:      General: Bowel sounds are normal. There is no distension.      Palpations: Abdomen is soft. There is no mass.      Tenderness: There is no abdominal tenderness. There is no guarding or rebound.      Hernia: No hernia is present.   Lymphadenopathy:      Head:      Right side of head: No submental, submandibular, tonsillar, preauricular, posterior auricular or occipital adenopathy.      Left side of head: No submental, submandibular, tonsillar, preauricular, posterior auricular or occipital adenopathy.      Cervical: No cervical adenopathy.   Skin:     General: Skin is warm and dry.      Coloration: Skin is not pale.   Neurological:      Mental Status: He is alert and oriented to person, place, and time.      Cranial Nerves: No cranial nerve deficit.      Sensory: No sensory deficit.       "Coordination: Coordination normal.      Gait: Gait normal.      Deep Tendon Reflexes: Reflexes are normal and symmetric.         Data / Lab Results:    Hemoglobin A1C   Date Value Ref Range Status   07/24/2024 8.0 (A) 4.5 - 5.7 % Final   03/19/2024 6.9 (A) 4.5 - 5.7 % Final   10/25/2023 7.5 (A) 4.5 - 5.7 % Final        Lab Results   Component Value Date    LDL 89 07/26/2024    LDL 76 07/25/2023    LDL 69 04/20/2022     Lab Results   Component Value Date    CHOL 223 (H) 09/25/2018    CHOL 211 (H) 12/11/2017    CHOL 197 09/24/2016     Lab Results   Component Value Date    TRIG 166 (H) 07/26/2024    TRIG 121 07/25/2023    TRIG 341 (H) 04/20/2022     Lab Results   Component Value Date    HDL 43 07/26/2024    HDL 44 07/25/2023    HDL 34 (L) 04/20/2022     No results found for: \"PSA\"  Lab Results   Component Value Date    WBC 5.9 07/26/2024    HGB 15.1 07/26/2024    HCT 45.2 07/26/2024    MCV 90 07/26/2024     07/26/2024     Lab Results   Component Value Date    TSH 2.150 07/26/2024     Lab Results   Component Value Date    GLUCOSE 164 (H) 07/26/2024    BUN 21 07/26/2024    CREATININE 1.09 07/26/2024    EGFRIFNONA 106 10/20/2020    EGFRIFAFRI 123 10/20/2020    BCR 19 07/26/2024    K 4.3 07/26/2024    CO2 28 07/26/2024    CALCIUM 9.4 07/26/2024    PROTENTOTREF 6.8 07/26/2024    ALBUMIN 4.5 07/26/2024    LABIL2 2.0 04/20/2022    AST 45 (H) 07/26/2024    ALT 56 (H) 07/26/2024     Lab Results   Component Value Date    GENO Negative 10/16/2019      No results found for: \"CRP\"   No results found for: \"IRON\", \"TIBC\", \"FERRITIN\"   Lab Results   Component Value Date    GCVXXQYM99 504 12/11/2017        Age-appropriate Screening Schedule:  Refer to the list below for future screening recommendations based on patient's age, sex and/or medical conditions. Orders for these recommended tests are listed in the plan section. The patient has been provided with a written plan.    Health Maintenance   Topic Date Due    COLORECTAL " CANCER SCREENING  Never done    Pneumococcal Vaccine 0-64 (1 of 2 - PCV) Never done    Hepatitis B (1 of 3 - 19+ 3-dose series) Never done    TDAP/TD VACCINES (1 - Tdap) Never done    COVID-19 Vaccine (1 - 2023-24 season) Never done    INFLUENZA VACCINE  08/01/2024    HEPATITIS C SCREENING  07/24/2025 (Originally 7/9/2019)    DIABETIC EYE EXAM  12/27/2024    HEMOGLOBIN A1C  01/24/2025    DIABETIC FOOT EXAM  03/19/2025    URINE MICROALBUMIN  03/19/2025    ANNUAL PHYSICAL  07/24/2025    BMI FOLLOWUP  07/24/2025    LIPID PANEL  07/26/2025           Assessment & Plan      Medications        Problem List         LOS    Physical.  Doing well.  Recommend update tetanus shot at [facility].  Flu vaccine declined.  Discussed health maintenance, screening tests, lifestyle modification.  Type 2 diabetes.  New onset.  Worse today, A1c 8 today, start Ozempic.  Discussed diet, exercise, lifestyle modification.  Has not been watching diet, restart, overall good control/diet controlled. Monitor blood sugars.  Consider nutritionist referral.  Follow-up 3 month.   Consider add lisinopril, plan ophthalmology referral.  Nephrolithiasis.  Improved/resolved currently. 4 mm left distal ureter per CT 4/20, multiple other stones bilateral kidneys 4 to 5 mm.  Increase fluid intake. Urology follow up declined check xray abd.   Knee pain.  Left, patellar tendinitis.  OA contributing.  IM steroids given.  Ice, NSAIDs, rehabilitation exercises discussed.  Call return if persistent symptoms.  Gallstone.  Small stone incidental finding per CT 4/20.  Asymptomatic.  Consider surgery referral if symptoms develop.  Hyperlipidemia.  Improved back on simvastatin,.  Familial with significant elevated triglycerides.  Improved on statin.  Follow-up recheck.  Fatty liver.  With mild elevation LFTs.  Continue fish oil.  Continue to monitor.  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  Recommend hematology follow-up, offered second  opinion from alternative hematologist he declines currently.  Clinically stable on Claritin/ranitidine.  Lumbar disc disease.  Undergoing PT currently, considering epidural injections.  Followed by [employer].  Asthma.  Doing well, no recent flares. baseline inhaler use currently.  Hypertension.  Much improved, normotensive today home blood pressure monitor results reviewed, tolerating increase lisinopril/addition HCTZ. Monitor bp at home, close follow up scheuled.  Consider increase rx if persistent elevation.  Overall good control on carvedilol.  Cardiolite stress testing benign 2012.  Carotid Dopplers normal 2015.  Discussed low-sodium diet.  Follow-up recheck.  Anxiety.  Recommend Rx he declines.  Family history of colon cancer.  His uncle.  Remote h/o colonoscoy recommend repeat / [facility] referral scheduled.   Groin sprain.  Benign exam today.  Ice, rehabilitation exercise discussed.  Start prednisone.  Call return if worsening symptoms.  CDL physical.  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  Low back pain.  Flare L DD, ice, rehabilitation exercises discussed.  Start nightly muscle relaxant.  Consider repeat imaging if persistent symptoms.  Tinea cruris.  Start Lotrisone.    Previous Exams:  Last Carotid Dopplers was 9/11/15 ordered by Dr Noyola               · Impression of No Visible plaque or hemodynamically significant stenosis at the carotid bifurcations. Normal antegrade flow in both vertebral arteries    Last Stress Test was on 11/14/07 ordered by Dr Song.    · Impression of Negative stress test for inducible ischemia.     Last EKG was on 9/25/2018 ordered by Dr Noyola.    · Impression of Sinus Bradycardia     Last Echocardiogram was on 4/29/2012 ordered by Dr Noyola.    · Impression of left atrium is of normal size and structure.    ·The right atrium is of normal size and structure.    ·The right ventricle appears to have normal overall size thickness and  contractility.    ·The left ventricle appears to have normal overall size thickness and contractility.    ·The estimated ejection fraction is 68%.    ·There are no regional wall motion abnormalities identified.    ·The aortic valve is morphologically normal.    ·There is no aortic stenosis or regurgitation present.    ·The mitral valve is morphologically normal.    ·There is trace to mild mitral regurgitation detected.    ·The tricuspid regurgitation detected however normal.    ·There is trace tricuspid regurgitation detected.    Had a dexa scan on 2/27/13     Diagnoses and all orders for this visit:    1. Annual visit for general adult medical examination with abnormal findings (Primary)    2. Type 2 diabetes mellitus with hyperglycemia, without long-term current use of insulin  -     POC Glycosylated Hemoglobin (Hb A1C)  -     POCT urinalysis dipstick, manual  -     Discontinue: empagliflozin (Jardiance) 10 MG tablet tablet; Take 1 tablet by mouth Daily.  Dispense: 90 tablet; Refill: 1  -     Semaglutide,0.25 or 0.5MG/DOS, (Ozempic, 0.25 or 0.5 MG/DOSE,) 2 MG/1.5ML solution pen-injector; Inject 0.25 mg under the skin into the appropriate area as directed 1 (One) Time Per Week.  Dispense: 3 mL; Refill: 1    3. Essential hypertension    4. Dyslipidemia    5. Class 3 severe obesity due to excess calories with serious comorbidity and body mass index (BMI) of 40.0 to 44.9 in adult  Assessment & Plan:  Patient's (Body mass index is 40.6 kg/m².) indicates that they are morbidly/severely obese (BMI > 40 or > 35 with obesity - related health condition) with health conditions that include hypertension, diabetes mellitus, dyslipidemias, GERD, and osteoarthritis . Weight is worsening. BMI  is above average; BMI management plan is completed. We discussed portion control and increasing exercise.       6. Tobacco use disorder    7. Acute bilateral low back pain, unspecified whether sciatica present  -     cyclobenzaprine  (FLEXERIL) 10 MG tablet; Take 0.5-1 tablets by mouth Daily As Needed for Muscle Spasms.  Dispense: 30 tablet; Refill: 2    8. Mild intermittent asthma without complication        Class 2 Severe Obesity (BMI >=35 and <=39.9). Obesity-related health conditions include the following: hypertension, diabetes mellitus, dyslipidemias, GERD, and osteoarthritis. Obesity is unchanged. BMI is is above average; BMI management plan is completed. We discussed portion control and increasing exercise.        Expected course, medications, and adverse effects discussed.  Call or return if worsening or persistent symptoms.  I wore protective equipment throughout this patient encounter including a mask, gloves, and eye protection.  Hand hygiene was performed before donning protective equipment and after removal when leaving the room. The complete contents of the Assessment and Plan and Data / Lab Results as documented above have been reviewed and addressed by myself with the patient today as part of an ongoing evaluation / treatment plan.  If some of the documentation has been copied from a previous note and is unchanged it indicates that this problem / plan has been assessed today but is stable from a previous visit and no changes have been recommended.  The separate E/M service provided today is significant, medically necessary, and separately identifiable.

## 2024-07-24 ENCOUNTER — OFFICE VISIT (OUTPATIENT)
Dept: FAMILY MEDICINE CLINIC | Facility: CLINIC | Age: 49
End: 2024-07-24
Payer: COMMERCIAL

## 2024-07-24 VITALS
RESPIRATION RATE: 18 BRPM | OXYGEN SATURATION: 96 % | TEMPERATURE: 97.8 F | SYSTOLIC BLOOD PRESSURE: 132 MMHG | HEART RATE: 76 BPM | WEIGHT: 293.13 LBS | BODY MASS INDEX: 41.04 KG/M2 | DIASTOLIC BLOOD PRESSURE: 76 MMHG | HEIGHT: 71 IN

## 2024-07-24 DIAGNOSIS — F17.200 TOBACCO USE DISORDER: ICD-10-CM

## 2024-07-24 DIAGNOSIS — Z00.01 ANNUAL VISIT FOR GENERAL ADULT MEDICAL EXAMINATION WITH ABNORMAL FINDINGS: Primary | ICD-10-CM

## 2024-07-24 DIAGNOSIS — E78.5 DYSLIPIDEMIA: ICD-10-CM

## 2024-07-24 DIAGNOSIS — E66.01 CLASS 3 SEVERE OBESITY DUE TO EXCESS CALORIES WITH SERIOUS COMORBIDITY AND BODY MASS INDEX (BMI) OF 40.0 TO 44.9 IN ADULT: ICD-10-CM

## 2024-07-24 DIAGNOSIS — M54.50 ACUTE BILATERAL LOW BACK PAIN, UNSPECIFIED WHETHER SCIATICA PRESENT: ICD-10-CM

## 2024-07-24 DIAGNOSIS — I10 ESSENTIAL HYPERTENSION: ICD-10-CM

## 2024-07-24 DIAGNOSIS — E11.65 TYPE 2 DIABETES MELLITUS WITH HYPERGLYCEMIA, WITHOUT LONG-TERM CURRENT USE OF INSULIN: ICD-10-CM

## 2024-07-24 DIAGNOSIS — J45.20 MILD INTERMITTENT ASTHMA WITHOUT COMPLICATION: ICD-10-CM

## 2024-07-24 LAB
BILIRUB BLD-MCNC: NEGATIVE MG/DL
CLARITY, POC: CLEAR
COLOR UR: YELLOW
EXPIRATION DATE: ABNORMAL
GLUCOSE UR STRIP-MCNC: NEGATIVE MG/DL
HBA1C MFR BLD: 8 % (ref 4.5–5.7)
KETONES UR QL: NEGATIVE
LEUKOCYTE EST, POC: NEGATIVE
Lab: ABNORMAL
NITRITE UR-MCNC: NEGATIVE MG/ML
PH UR: 7 [PH] (ref 5–8)
PROT UR STRIP-MCNC: NEGATIVE MG/DL
RBC # UR STRIP: NEGATIVE /UL
SP GR UR: 1.01 (ref 1–1.03)
UROBILINOGEN UR QL: NORMAL

## 2024-07-24 PROCEDURE — 99396 PREV VISIT EST AGE 40-64: CPT | Performed by: FAMILY MEDICINE

## 2024-07-24 PROCEDURE — 99213 OFFICE O/P EST LOW 20 MIN: CPT | Performed by: FAMILY MEDICINE

## 2024-07-24 PROCEDURE — 81002 URINALYSIS NONAUTO W/O SCOPE: CPT | Performed by: FAMILY MEDICINE

## 2024-07-24 PROCEDURE — 83036 HEMOGLOBIN GLYCOSYLATED A1C: CPT | Performed by: FAMILY MEDICINE

## 2024-07-24 RX ORDER — SEMAGLUTIDE 1.34 MG/ML
0.25 INJECTION, SOLUTION SUBCUTANEOUS WEEKLY
Qty: 3 ML | Refills: 1 | Status: SHIPPED | OUTPATIENT
Start: 2024-07-24

## 2024-07-24 RX ORDER — CYCLOBENZAPRINE HCL 10 MG
5-10 TABLET ORAL DAILY PRN
Qty: 30 TABLET | Refills: 2 | Status: SHIPPED | OUTPATIENT
Start: 2024-07-24

## 2024-07-24 NOTE — ASSESSMENT & PLAN NOTE
Patient's (Body mass index is 40.6 kg/m².) indicates that they are morbidly/severely obese (BMI > 40 or > 35 with obesity - related health condition) with health conditions that include hypertension, diabetes mellitus, dyslipidemias, GERD, and osteoarthritis . Weight is worsening. BMI  is above average; BMI management plan is completed. We discussed portion control and increasing exercise.

## 2024-07-28 DIAGNOSIS — I10 ESSENTIAL HYPERTENSION: ICD-10-CM

## 2024-07-29 RX ORDER — LISINOPRIL 40 MG/1
40 TABLET ORAL DAILY
Qty: 90 TABLET | Refills: 0 | Status: SHIPPED | OUTPATIENT
Start: 2024-07-29

## 2024-08-09 ENCOUNTER — TELEPHONE (OUTPATIENT)
Dept: FAMILY MEDICINE CLINIC | Facility: CLINIC | Age: 49
End: 2024-08-09
Payer: COMMERCIAL

## 2024-08-09 NOTE — TELEPHONE ENCOUNTER
We gave referral packet for [facility] for repeat colonoscopy due to family hx of colon cancer at his last office visit. Would you like me to bring him into the office to evaluate?

## 2024-08-09 NOTE — TELEPHONE ENCOUNTER
Asad came in requesting a referral to a Gastroenterology Office. He has noticed blood in stool when straining he thinks it's  Hemorrhoids

## 2024-08-13 ENCOUNTER — OFFICE VISIT (OUTPATIENT)
Dept: FAMILY MEDICINE CLINIC | Facility: CLINIC | Age: 49
End: 2024-08-13
Payer: COMMERCIAL

## 2024-08-13 VITALS
BODY MASS INDEX: 39.77 KG/M2 | HEIGHT: 72 IN | SYSTOLIC BLOOD PRESSURE: 122 MMHG | WEIGHT: 293.6 LBS | OXYGEN SATURATION: 97 % | RESPIRATION RATE: 18 BRPM | TEMPERATURE: 98.2 F | DIASTOLIC BLOOD PRESSURE: 80 MMHG | HEART RATE: 77 BPM

## 2024-08-13 DIAGNOSIS — K64.9 HEMORRHOIDS, UNSPECIFIED HEMORRHOID TYPE: Primary | ICD-10-CM

## 2024-08-13 DIAGNOSIS — K62.89 RECTAL PAIN: ICD-10-CM

## 2024-08-13 DIAGNOSIS — E66.01 CLASS 3 SEVERE OBESITY DUE TO EXCESS CALORIES WITH SERIOUS COMORBIDITY AND BODY MASS INDEX (BMI) OF 40.0 TO 44.9 IN ADULT: ICD-10-CM

## 2024-08-13 PROCEDURE — 99213 OFFICE O/P EST LOW 20 MIN: CPT | Performed by: FAMILY MEDICINE

## 2024-08-13 RX ORDER — AMOXICILLIN AND CLAVULANATE POTASSIUM 875; 125 MG/1; MG/1
1 TABLET, FILM COATED ORAL 2 TIMES DAILY
Qty: 20 TABLET | Refills: 0 | Status: SHIPPED | OUTPATIENT
Start: 2024-08-13

## 2024-08-13 NOTE — ASSESSMENT & PLAN NOTE
Patient's (Body mass index is 39.82 kg/m².) indicates that they are morbidly/severely obese (BMI > 40 or > 35 with obesity - related health condition) with health conditions that include hypertension, diabetes mellitus, and dyslipidemias . Weight is unchanged. BMI  is above average; BMI management plan is completed. We discussed portion control and increasing exercise.

## 2024-08-13 NOTE — PROGRESS NOTES
Subjective   Asad Odom is a 48 y.o. male.     Hemorrhoids  This is a recurrent problem. The current episode started in the past 7 days. The problem occurs constantly. The problem has been gradually worsening. Pertinent negatives include no chills, diaphoresis, fatigue or fever. The treatment provided no relief.        The following portions of the patient's history were reviewed and updated as appropriate: allergies, current medications, past family history, past medical history, past social history, past surgical history, and problem list.    Family History   Problem Relation Age of Onset    Other Mother         suicide    Other Sister         Suicide sibilingd       Social History     Tobacco Use    Smoking status: Some Days     Current packs/day: 0.25     Average packs/day: 0.3 packs/day for 33.6 years (8.4 ttl pk-yrs)     Types: Cigarettes, Cigars     Start date: 1/1/1991     Passive exposure: Never    Smokeless tobacco: Current     Types: Chew    Tobacco comments:     Quit 7/16/19, but now chews off and on    Vaping Use    Vaping status: Never Used   Substance Use Topics    Alcohol use: Yes     Comment: occasional    Drug use: No       Past Surgical History:   Procedure Laterality Date    BONE BIOPSY      BONE MARROW BIOPSY      CYSTOSCOPY BLADDER STONE LITHOTRIPSY      KNEE MENISCAL REPAIR Left     MANDIBLE FRACTURE SURGERY         Patient Active Problem List   Diagnosis    Asthma    Dyslipidemia    Elevated serum tryptase    Essential hypertension    Mast cell disorder    Nephrolithiasis    Anxiety    GERD (gastroesophageal reflux disease)    Osteoarthritis    Carotid artery stenosis    AK (actinic keratosis)    Annual visit for general adult medical examination with abnormal findings    Tobacco use disorder    Class 3 severe obesity due to excess calories with serious comorbidity and body mass index (BMI) of 40.0 to 44.9 in adult    Arthritis    Elevated liver function tests    Acute right-sided low back  pain without sciatica    Encounter for CDL (commercial driving license) exam    Hemorrhoids    Hypercholesterolemia    Elevated fasting blood sugar    Type 2 diabetes mellitus with hyperglycemia, without long-term current use of insulin    Generalized abdominal pain    Hordeolum internum of left upper eyelid    Myopia of left eye    Rectal pain       Current Outpatient Medications on File Prior to Visit   Medication Sig Dispense Refill    aspirin 81 MG tablet Take 1 tablet by mouth.      carvedilol (COREG) 25 MG tablet Take 1 tablet by mouth 2 (Two) Times a Day With Meals. 180 tablet 0    cetirizine (zyrTEC) 5 MG tablet Take 1 tablet by mouth.      CINNAMON PO Take  by mouth.      cyclobenzaprine (FLEXERIL) 10 MG tablet Take 0.5-1 tablets by mouth Daily As Needed for Muscle Spasms. 30 tablet 2    fenofibrate (TRICOR) 145 MG tablet Take 1 tablet by mouth once daily 90 tablet 0    GARLIC PO Take  by mouth.      hydroCHLOROthiazide 25 MG tablet Take 1 tablet by mouth once daily 90 tablet 0    lisinopril (PRINIVIL,ZESTRIL) 40 MG tablet Take 1 tablet by mouth once daily 90 tablet 0    multivitamin with minerals tablet tablet Take 1 tablet by mouth Daily.      simvastatin (ZOCOR) 40 MG tablet TAKE 1 TABLET BY MOUTH ONCE DAILY AT NIGHT 90 tablet 0    clotrimazole-betamethasone (LOTRISONE) 1-0.05 % cream Apply 1 Application topically to the appropriate area as directed 2 (Two) Times a Day As Needed (rash). 60 g 2    hydrocortisone 1 % cream Apply 1 Application topically to the appropriate area as directed 2 (Two) Times a Day. 60 g 1    Semaglutide,0.25 or 0.5MG/DOS, (Ozempic, 0.25 or 0.5 MG/DOSE,) 2 MG/1.5ML solution pen-injector Inject 0.25 mg under the skin into the appropriate area as directed 1 (One) Time Per Week. 3 mL 1     No current facility-administered medications on file prior to visit.       Allergies   Allergen Reactions    Symbicort [Budesonide-Formoterol Fumarate] Myalgia       Review of Systems  "  Constitutional:  Negative for chills, diaphoresis, fatigue and fever.   Gastrointestinal:  Positive for blood in stool (2 x when the hemmroid came on last week), hemorrhoids and rectal pain.       Objective   Visit Vitals  /80 (BP Location: Left arm, Patient Position: Sitting, Cuff Size: Large Adult)   Pulse 77   Temp 98.2 °F (36.8 °C) (Temporal)   Resp 18   Ht 182.9 cm (72\")   Wt 133 kg (293 lb 9.6 oz)   SpO2 97%   BMI 39.82 kg/m²     Physical Exam  Vitals and nursing note reviewed.   Constitutional:       Appearance: He is well-developed.   HENT:      Head: Normocephalic.   Neck:      Thyroid: No thyromegaly.      Vascular: No carotid bruit.      Trachea: Trachea normal.   Cardiovascular:      Rate and Rhythm: Normal rate and regular rhythm.      Heart sounds: No murmur heard.     No friction rub. No gallop.   Pulmonary:      Effort: Pulmonary effort is normal. No respiratory distress.      Breath sounds: Normal breath sounds. No wheezing.   Chest:      Chest wall: No tenderness.   Genitourinary:     Comments: Anus extremly tender and tight  Musculoskeletal:      Cervical back: Neck supple.   Skin:     General: Skin is dry.      Findings: No rash.      Nails: There is no clubbing.   Neurological:      Mental Status: He is alert and oriented to person, place, and time.   Psychiatric:         Behavior: Behavior is cooperative.           Assessment & Plan .  Problem List Items Addressed This Visit          Unprioritized    Class 3 severe obesity due to excess calories with serious comorbidity and body mass index (BMI) of 40.0 to 44.9 in adult    Current Assessment & Plan     Patient's (Body mass index is 39.82 kg/m².) indicates that they are morbidly/severely obese (BMI > 40 or > 35 with obesity - related health condition) with health conditions that include hypertension, diabetes mellitus, and dyslipidemias . Weight is unchanged. BMI  is above average; BMI management plan is completed. We discussed portion " control and increasing exercise.          Hemorrhoids - Primary    Overview     2002 Flexible sigmoid by Dr. Ambrocio         Relevant Orders    Ambulatory Referral to Colorectal Surgery    Rectal pain    Overview     New dx.  Abscess verses internal hemmroid.   Start Augmentin 875 TID for 10 days. Patient given order for CBC and CMP.   Patient - referred to colorectal surgeon Dr. Kulkarni and is scheduled for 8-14-24 @ 1:30 in the [place] office at [address] [place] [address] 3.         Relevant Medications    amoxicillin-clavulanate (AUGMENTIN) 875-125 MG per tablet    Other Relevant Orders    Ambulatory Referral to Colorectal Surgery    CBC & Differential (Completed)    Comprehensive Metabolic Panel (Completed)

## 2024-08-14 ENCOUNTER — OFFICE VISIT (OUTPATIENT)
Age: 49
End: 2024-08-14
Payer: COMMERCIAL

## 2024-08-14 VITALS
HEART RATE: 73 BPM | HEIGHT: 72 IN | SYSTOLIC BLOOD PRESSURE: 126 MMHG | WEIGHT: 291 LBS | TEMPERATURE: 98.4 F | DIASTOLIC BLOOD PRESSURE: 92 MMHG | OXYGEN SATURATION: 98 % | BODY MASS INDEX: 39.42 KG/M2

## 2024-08-14 DIAGNOSIS — K60.2 ANAL FISSURE: Primary | ICD-10-CM

## 2024-08-14 DIAGNOSIS — K64.8 INTERNAL HEMORRHOID, BLEEDING: ICD-10-CM

## 2024-08-14 LAB
ALBUMIN SERPL-MCNC: 4.2 G/DL (ref 4.1–5.1)
ALP SERPL-CCNC: 58 IU/L (ref 44–121)
ALT SERPL-CCNC: 45 IU/L (ref 0–44)
AST SERPL-CCNC: 32 IU/L (ref 0–40)
BASOPHILS # BLD AUTO: 0.1 X10E3/UL (ref 0–0.2)
BASOPHILS NFR BLD AUTO: 1 %
BILIRUB SERPL-MCNC: 0.3 MG/DL (ref 0–1.2)
BUN SERPL-MCNC: 13 MG/DL (ref 6–24)
BUN/CREAT SERPL: 14 (ref 9–20)
CALCIUM SERPL-MCNC: 9.1 MG/DL (ref 8.7–10.2)
CHLORIDE SERPL-SCNC: 104 MMOL/L (ref 96–106)
CO2 SERPL-SCNC: 23 MMOL/L (ref 20–29)
CREAT SERPL-MCNC: 0.94 MG/DL (ref 0.76–1.27)
EGFRCR SERPLBLD CKD-EPI 2021: 99 ML/MIN/1.73
EOSINOPHIL # BLD AUTO: 0.1 X10E3/UL (ref 0–0.4)
EOSINOPHIL NFR BLD AUTO: 2 %
ERYTHROCYTE [DISTWIDTH] IN BLOOD BY AUTOMATED COUNT: 12.6 % (ref 11.6–15.4)
GLOBULIN SER CALC-MCNC: 2.1 G/DL (ref 1.5–4.5)
GLUCOSE SERPL-MCNC: 180 MG/DL (ref 70–99)
HCT VFR BLD AUTO: 43.8 % (ref 37.5–51)
HGB BLD-MCNC: 14.3 G/DL (ref 13–17.7)
IMM GRANULOCYTES # BLD AUTO: 0 X10E3/UL (ref 0–0.1)
IMM GRANULOCYTES NFR BLD AUTO: 0 %
LYMPHOCYTES # BLD AUTO: 1.9 X10E3/UL (ref 0.7–3.1)
LYMPHOCYTES NFR BLD AUTO: 35 %
MCH RBC QN AUTO: 30.1 PG (ref 26.6–33)
MCHC RBC AUTO-ENTMCNC: 32.6 G/DL (ref 31.5–35.7)
MCV RBC AUTO: 92 FL (ref 79–97)
MONOCYTES # BLD AUTO: 0.5 X10E3/UL (ref 0.1–0.9)
MONOCYTES NFR BLD AUTO: 9 %
NEUTROPHILS # BLD AUTO: 2.8 X10E3/UL (ref 1.4–7)
NEUTROPHILS NFR BLD AUTO: 53 %
PLATELET # BLD AUTO: 210 X10E3/UL (ref 150–450)
POTASSIUM SERPL-SCNC: 4.3 MMOL/L (ref 3.5–5.2)
PROT SERPL-MCNC: 6.3 G/DL (ref 6–8.5)
RBC # BLD AUTO: 4.75 X10E6/UL (ref 4.14–5.8)
SODIUM SERPL-SCNC: 139 MMOL/L (ref 134–144)
WBC # BLD AUTO: 5.4 X10E3/UL (ref 3.4–10.8)

## 2024-08-15 ENCOUNTER — TELEPHONE (OUTPATIENT)
Dept: FAMILY MEDICINE CLINIC | Facility: CLINIC | Age: 49
End: 2024-08-15
Payer: COMMERCIAL

## 2024-08-15 NOTE — TELEPHONE ENCOUNTER
LMOM asking pt. To return call and let us know how he is doing and how his appt. Went with colorectal surgeon.  Also labs look good.

## 2024-08-17 NOTE — PROGRESS NOTES
"Colorectal Surgery Consultation Note    ID:  Asad Odom;   : 1975  DATE OF VISIT: 2024    Chief Complaint  New Patient  (NP referred by Dr Watts for rectal abscess )       History of Present Illness  Asad Odom is a 48 y.o. male who I was asked to see in consultation by Dr. Watts, Yury REN MD to evaluate for anorectal pain.    These symptoms have been going on for over a week. Reports burning and \"sharp glass cutting through\" during BM. Pain persist for few hrs after BM. He recently saw Dr. Watts and was placed on abx. He start to feel significantly better after abx.    Patient states he has a bowel movement 2-3 per day. It is loose in consistency. Patient has bleeding with stools; he has pain with bowel movements; he has itching;  he states and has no protrusion of tissue while straining.    Patient does not take supplemental fiber.     Past Medical History  Past Medical History:   Diagnosis Date    Annual visit for general adult medical examination with abnormal findings 10/16/2019    Anxiety     Arthritis     Asthma     Diabetes mellitus     Headache     History of tobacco use 10/16/2019    Hyperlipidemia     Hypertension     Injury of back     Overweight 10/16/2019    Shortness of breath     Urinary tract infection      Past Surgical History  Past Surgical History:   Procedure Laterality Date    BONE BIOPSY      BONE MARROW BIOPSY      CYSTOSCOPY BLADDER STONE LITHOTRIPSY      KNEE MENISCAL REPAIR Left     MANDIBLE FRACTURE SURGERY       Family History  Family History   Problem Relation Age of Onset    Other Mother         suicide    Other Sister         Suicide sibilingd     No colorectal cancer history in immediate family members.  Social History  Social History     Tobacco Use    Smoking status: Some Days     Current packs/day: 0.25     Average packs/day: 0.3 packs/day for 33.6 years (8.4 ttl pk-yrs)     Types: Cigarettes, Cigars     Start date: 1991     Passive exposure: Never    " Smokeless tobacco: Current     Types: Chew    Tobacco comments:     Quit 7/16/19, but now chews off and on    Vaping Use    Vaping status: Never Used   Substance Use Topics    Alcohol use: Yes     Comment: occasional    Drug use: No     For further details please see Health History Questionnaire scanned in Epic.  Medication List  [unfilled]  Allergies  Allergies   Allergen Reactions    Symbicort [Budesonide-Formoterol Fumarate] Myalgia     Review of Systems  All systems reviewed and are otherwise negative, pertinent positives noted in the HPI and Health History Questionnaire scanned in Epic.    Physical Exam  General:  No acute distress  Head: Normocephalic, atraumatic  Neuro: Alert and oriented    Abdomen:  Soft, non-tender, non-distended, no hernias, no hepatomegaly, no splenomegaly. No abnormal, audible bowel sounds.    External anorectal exam: posterior midline anal fissure noted on distraction of buttocks. There is no associated skin tag.   Digital rectal: exquisite tenderness with exam, no palpable masses, tone is significantly increased .    Procedure Note  Unable to perform anoscopy due significant pain and increased tone anal sphincter     Assessment  -Anal fissure  -Anorectal bleeding   -Anorectal pain   -Enlarged internal hemorrhoids  -Pruritis ani    Plan / Recommendations    1. A handout regarding anal fissures was given to patient. I explained the pathophysiology behind anal fissures and our initial management which will be with topical Nifedipine with lidocaine cream for which a prescription is given. We have discussed that patient may require surgery if it is refractory to medical management which would carry the risks of bleeding, abscess, incontinence, recurrence, or need for further procedures.    2. In order to help with his bowel movements a high fiber diet is encouraged along with supplemental fiber in the form of Citrucel or Metamucil or any of the other psyllium based products. I have  recommended that he start by taking 2 teaspoons in a glass of water once per day for a week and to gradually work up to taking it twice per day. I explained that it is normal to have increased gas and bloating when first starting on fiber, but that this ought to get better after ~ 3 weeks to 3 months.          3. Patient will follow up in 2 weeks for re-evaluation.       Luis Kulkarni MD  Colon and Rectal Surgery   [facility]

## 2024-08-19 ENCOUNTER — TELEPHONE (OUTPATIENT)
Age: 49
End: 2024-08-19
Payer: COMMERCIAL

## 2024-09-04 ENCOUNTER — OFFICE VISIT (OUTPATIENT)
Age: 49
End: 2024-09-04
Payer: COMMERCIAL

## 2024-09-04 VITALS
HEIGHT: 72 IN | TEMPERATURE: 98.6 F | WEIGHT: 298.6 LBS | RESPIRATION RATE: 22 BRPM | HEART RATE: 74 BPM | OXYGEN SATURATION: 98 % | DIASTOLIC BLOOD PRESSURE: 89 MMHG | BODY MASS INDEX: 40.44 KG/M2 | SYSTOLIC BLOOD PRESSURE: 159 MMHG

## 2024-09-04 DIAGNOSIS — K60.2 ANAL FISSURE: Primary | ICD-10-CM

## 2024-09-04 DIAGNOSIS — K64.8 INTERNAL HEMORRHOID, BLEEDING: ICD-10-CM

## 2024-09-04 PROCEDURE — 99213 OFFICE O/P EST LOW 20 MIN: CPT | Performed by: STUDENT IN AN ORGANIZED HEALTH CARE EDUCATION/TRAINING PROGRAM

## 2024-09-04 NOTE — PROGRESS NOTES
Colorectal Surgery Followup Note    ID:  Asad Odom;   : 1975  DATE OF VISIT: 2024    Chief Complaint  Follow-up (2024/Anal fissure/)       Subjective    Reports doing better but only has been applying the Nifedipine for only a week. He has seen improvement since he started applying Nifedipine. Reports no blood in the stool.         Exam  General:  No acute distress  Head: Normocephalic, atraumatic  Neuro: Alert and oriented      Assessment  - Anal fissure  -Anorectal bleeding   -Anorectal pain   -Enlarged internal hemorrhoids  -Pruritis ani    Plan / Recommendations  - Showing improvements with Nifedipine. We will continue applying 3-4 a day.   - Encouraged to continue with metamucil and increased water intake.   - follow up in 3-4 weeks.       Luis Kulkarni MD  Colon and Rectal Surgery   [facility]

## 2024-09-19 DIAGNOSIS — E78.5 DYSLIPIDEMIA: ICD-10-CM

## 2024-09-19 DIAGNOSIS — I10 ESSENTIAL HYPERTENSION: ICD-10-CM

## 2024-09-20 ENCOUNTER — TELEPHONE (OUTPATIENT)
Age: 49
End: 2024-09-20
Payer: COMMERCIAL

## 2024-09-20 RX ORDER — HYDROCORTISONE ACETATE PRAMOXINE HCL 2.5; 1 G/100G; G/100G
CREAM TOPICAL 3 TIMES DAILY
Qty: 30 G | Refills: 0 | Status: SHIPPED | OUTPATIENT
Start: 2024-09-20

## 2024-09-20 RX ORDER — CARVEDILOL 25 MG/1
25 TABLET ORAL 2 TIMES DAILY WITH MEALS
Qty: 180 TABLET | Refills: 0 | Status: SHIPPED | OUTPATIENT
Start: 2024-09-20

## 2024-09-20 RX ORDER — SIMVASTATIN 40 MG
40 TABLET ORAL NIGHTLY
Qty: 90 TABLET | Refills: 0 | Status: SHIPPED | OUTPATIENT
Start: 2024-09-20

## 2024-09-25 ENCOUNTER — OFFICE VISIT (OUTPATIENT)
Age: 49
End: 2024-09-25
Payer: COMMERCIAL

## 2024-09-25 VITALS
TEMPERATURE: 98.2 F | OXYGEN SATURATION: 98 % | SYSTOLIC BLOOD PRESSURE: 145 MMHG | DIASTOLIC BLOOD PRESSURE: 97 MMHG | WEIGHT: 299.8 LBS | HEART RATE: 74 BPM | RESPIRATION RATE: 22 BRPM | BODY MASS INDEX: 40.61 KG/M2 | HEIGHT: 72 IN

## 2024-09-25 DIAGNOSIS — K60.2 ANAL FISSURE: Primary | ICD-10-CM

## 2024-09-25 DIAGNOSIS — K64.8 INTERNAL HEMORRHOID, BLEEDING: ICD-10-CM

## 2024-10-16 DIAGNOSIS — E78.00 HYPERCHOLESTEROLEMIA: ICD-10-CM

## 2024-10-16 DIAGNOSIS — I10 ESSENTIAL HYPERTENSION: ICD-10-CM

## 2024-10-16 RX ORDER — HYDROCHLOROTHIAZIDE 25 MG/1
25 TABLET ORAL DAILY
Qty: 90 TABLET | Refills: 0 | Status: SHIPPED | OUTPATIENT
Start: 2024-10-16

## 2024-10-16 RX ORDER — FENOFIBRATE 145 MG/1
145 TABLET, COATED ORAL DAILY
Qty: 90 TABLET | Refills: 0 | Status: SHIPPED | OUTPATIENT
Start: 2024-10-16

## 2024-10-23 ENCOUNTER — OFFICE VISIT (OUTPATIENT)
Age: 49
End: 2024-10-23
Payer: COMMERCIAL

## 2024-10-23 VITALS
TEMPERATURE: 98.7 F | HEART RATE: 78 BPM | OXYGEN SATURATION: 97 % | SYSTOLIC BLOOD PRESSURE: 188 MMHG | HEIGHT: 72 IN | BODY MASS INDEX: 39.96 KG/M2 | DIASTOLIC BLOOD PRESSURE: 97 MMHG | WEIGHT: 295 LBS

## 2024-10-23 DIAGNOSIS — K60.2 ANAL FISSURE: Primary | ICD-10-CM

## 2024-10-23 PROCEDURE — 99214 OFFICE O/P EST MOD 30 MIN: CPT | Performed by: STUDENT IN AN ORGANIZED HEALTH CARE EDUCATION/TRAINING PROGRAM

## 2024-10-23 RX ORDER — SODIUM CHLORIDE 0.9 % (FLUSH) 0.9 %
3 SYRINGE (ML) INJECTION EVERY 12 HOURS SCHEDULED
OUTPATIENT
Start: 2024-10-23

## 2024-10-23 RX ORDER — SODIUM CHLORIDE 0.9 % (FLUSH) 0.9 %
3-10 SYRINGE (ML) INJECTION AS NEEDED
OUTPATIENT
Start: 2024-10-23

## 2024-10-23 NOTE — PROGRESS NOTES
Colorectal Surgery Followup Note    ID:  Asad Odom;   : 1975  DATE OF VISIT: 10/23/2024    Chief Complaint  Follow-up (Follow up anal fissure )       Chief Complaint  Follow-up (Anal fissure)        Continues to have pain with bowel movement and bleeding despite nifedipine use and fiber supplements.        Exam  General:  No acute distress  Head: Normocephalic, atraumatic  Neuro: Alert and oriented        Assessment  - Anal fissure  -Anorectal bleeding   -Anorectal pain   -Enlarged internal hemorrhoids  -Pruritis ani     Plan / Recommendations  -Given that he felt medical management we will proceed with surgery.  I have discussed the different surgical approaches including sphincterotomy versus Botox injections.  We will proceed with lateral sphincterotomy.  We have discussed complication rates including fecal incontinence and recurrence rate of the surgical approaches.   - Encouraged to continue with metamucil and increased water intake.   -Follow-up at the time of surgery        Luis Kulkarni MD  Colon and Rectal Surgery   [facility]

## 2024-10-29 RX ORDER — FAMOTIDINE 20 MG/1
20 TABLET, FILM COATED ORAL 2 TIMES DAILY
COMMUNITY

## 2024-10-29 RX ORDER — DIPHENHYDRAMINE HCL 25 MG
25 CAPSULE ORAL EVERY 6 HOURS PRN
COMMUNITY

## 2024-11-01 NOTE — PROGRESS NOTES
Medical Examination      Subjective     Asad Odom is a 49 y.o. male who presents today for a  fitness determination physical exam. The patient reports no problems.  The following portions of the patient's history were reviewed and updated as appropriate: allergies, current medications, past family history, past medical history, past social history, past surgical history, and problem list.    I personally reviewed and updated the patient's allergies, medications, problem list, and past medical, surgical, social, and family history. I have reviewed and confirmed the accuracy of the History of Present Illness and Review of Symptoms as documented by the MA/LPN/RN. Pablo Noyola MD        Family History   Problem Relation Age of Onset    Other Mother         suicide    Other Sister         Suicide sibilingd       Social History     Tobacco Use    Smoking status: Former     Current packs/day: 0.25     Average packs/day: 0.3 packs/day for 33.8 years (8.5 ttl pk-yrs)     Types: Cigarettes, Cigars     Start date: 1/1/1991     Passive exposure: Never    Smokeless tobacco: Current     Types: Chew    Tobacco comments:     Quit 7/16/19, but now chews off and on    Vaping Use    Vaping status: Never Used   Substance Use Topics    Alcohol use: Yes     Comment: occasional    Drug use: No       Past Surgical History:   Procedure Laterality Date    BONE BIOPSY      BONE MARROW BIOPSY      CYSTOSCOPY BLADDER STONE LITHOTRIPSY      KNEE MENISCAL REPAIR Left     MANDIBLE FRACTURE SURGERY         Patient Active Problem List   Diagnosis    Asthma    Dyslipidemia    Elevated serum tryptase    Essential hypertension    Mast cell disorder    Nephrolithiasis    Anxiety    GERD (gastroesophageal reflux disease)    Osteoarthritis    Carotid artery stenosis    AK (actinic keratosis)    Annual visit for general adult medical examination with abnormal findings    Tobacco use disorder    Class 3 severe  obesity due to excess calories with serious comorbidity and body mass index (BMI) of 40.0 to 44.9 in adult    Arthritis    Elevated liver function tests    Acute right-sided low back pain without sciatica    Encounter for CDL (commercial driving license) exam    Hemorrhoids    Hypercholesterolemia    Elevated fasting blood sugar    Type 2 diabetes mellitus with hyperglycemia, without long-term current use of insulin    Generalized abdominal pain    Hordeolum internum of left upper eyelid    Myopia of left eye    Rectal pain    Anal fissure         Current Outpatient Medications:     aspirin 81 MG tablet, Take 1 tablet by mouth., Disp: , Rfl:     carvedilol (COREG) 25 MG tablet, TAKE 1 TABLET BY MOUTH TWICE DAILY WITH MEALS, Disp: 180 tablet, Rfl: 0    cetirizine (zyrTEC) 5 MG tablet, Take 1 tablet by mouth., Disp: , Rfl:     CINNAMON PO, Take  by mouth., Disp: , Rfl:     clotrimazole-betamethasone (LOTRISONE) 1-0.05 % cream, Apply 1 Application topically to the appropriate area as directed 2 (Two) Times a Day As Needed (rash)., Disp: 60 g, Rfl: 2    cyclobenzaprine (FLEXERIL) 10 MG tablet, Take 0.5-1 tablets by mouth Daily As Needed for Muscle Spasms., Disp: 30 tablet, Rfl: 2    diphenhydrAMINE (BENADRYL) 25 mg capsule, Take 1 capsule by mouth Every 6 (Six) Hours As Needed for Itching., Disp: , Rfl:     docusate sodium (COLACE) 50 MG capsule, Take 1 capsule by mouth 2 (Two) Times a Day., Disp: , Rfl:     famotidine (PEPCID) 20 MG tablet, Take 1 tablet by mouth 2 (Two) Times a Day., Disp: , Rfl:     fenofibrate (TRICOR) 145 MG tablet, Take 1 tablet by mouth once daily, Disp: 90 tablet, Rfl: 0    GARLIC PO, Take  by mouth., Disp: , Rfl:     hydroCHLOROthiazide 25 MG tablet, Take 1 tablet by mouth once daily, Disp: 90 tablet, Rfl: 0    Hydrocort-Pramoxine, Perianal, (Analpram HC) 2.5-1 % rectal cream, Insert  into the rectum 3 (Three) Times a Day., Disp: 30 g, Rfl: 0    hydrocortisone 1 % cream, Apply 1 Application  "topically to the appropriate area as directed 2 (Two) Times a Day., Disp: 60 g, Rfl: 1    lidocaine (XYLOCAINE) 2 % jelly, 0.5% NIFEDIPINE WITH 2% LIDOCAINE , APPLY TO RECTUM , 3-4 TIMES DAILY, Disp: 30 g, Rfl: 0    lisinopril (PRINIVIL,ZESTRIL) 40 MG tablet, Take 1 tablet by mouth once daily, Disp: 90 tablet, Rfl: 0    Misc Natural Products (BEET ROOT PO), Take  by mouth., Disp: , Rfl:     multivitamin with minerals tablet tablet, Take 1 tablet by mouth Daily., Disp: , Rfl:     simvastatin (ZOCOR) 40 MG tablet, TAKE 1 TABLET BY MOUTH ONCE DAILY AT NIGHT, Disp: 90 tablet, Rfl: 0    amoxicillin-clavulanate (AUGMENTIN) 875-125 MG per tablet, Take 1 tablet by mouth 2 (Two) Times a Day. (Patient not taking: Reported on 11/4/2024), Disp: 20 tablet, Rfl: 0    Semaglutide,0.25 or 0.5MG/DOS, (Ozempic, 0.25 or 0.5 MG/DOSE,) 2 MG/1.5ML solution pen-injector, Inject 0.25 mg under the skin into the appropriate area as directed 1 (One) Time Per Week. (Patient not taking: Reported on 11/4/2024), Disp: 3 mL, Rfl: 1         Review of Systems   Constitutional:  Negative for chills and diaphoresis.   Eyes:  Negative for visual disturbance.   Respiratory:  Negative for shortness of breath.    Cardiovascular:  Negative for chest pain and palpitations.   Gastrointestinal:  Negative for abdominal pain and nausea.   Endocrine: Negative for polydipsia and polyphagia.   Musculoskeletal:  Negative for neck stiffness.   Skin:  Negative for color change and pallor.   Neurological:  Negative for seizures and syncope.   Hematological:  Negative for adenopathy.   Psychiatric/Behavioral:  Negative for hallucinations and suicidal ideas.        I have reviewed and confirmed the accuracy of the ROS as documented by the MA/LPN/RN Pablo Noyola MD      Objective   /82 (BP Location: Right arm, Patient Position: Sitting, Cuff Size: Large Adult)   Pulse 81   Temp 98.1 °F (36.7 °C) (Temporal)   Resp 18   Ht 182.9 cm (72\")   Wt (!) 137 kg (302 " lb)   SpO2 95%   BMI 40.96 kg/m²   Wt Readings from Last 3 Encounters:   11/04/24 (!) 137 kg (302 lb)   10/23/24 134 kg (295 lb)   09/25/24 136 kg (299 lb 12.8 oz)       Vision:   Uncorrected Corrected Horizontal Field of Vision   Right Eye 20/20 . >85 degrees   Left Eye  20/20 . >85 degrees   Both Eyes  20/20 .      Applicant can recognize and distinguish among traffic control signals and devices showing standard red, green, and lynette colors.         Monocular Vision?: No    Physical Exam  Constitutional:       Appearance: Normal appearance. He is well-developed. He is not diaphoretic.   HENT:      Head: Normocephalic and atraumatic.      Right Ear: Tympanic membrane, ear canal and external ear normal.      Left Ear: Tympanic membrane, ear canal and external ear normal.      Nose: Nose normal.      Mouth/Throat:      Mouth: Mucous membranes are moist.      Pharynx: No oropharyngeal exudate or posterior oropharyngeal erythema.   Eyes:      General: Lids are normal. No scleral icterus.        Right eye: No foreign body or discharge.         Left eye: No foreign body or discharge.      Extraocular Movements: Extraocular movements intact.      Right eye: No nystagmus.      Left eye: No nystagmus.      Conjunctiva/sclera: Conjunctivae normal.      Right eye: Right conjunctiva is not injected. No exudate or hemorrhage.     Left eye: Left conjunctiva is not injected. No exudate or hemorrhage.     Pupils: Pupils are equal, round, and reactive to light.      Funduscopic exam:     Right eye: No hemorrhage, exudate, AV nicking, arteriolar narrowing or papilledema.         Left eye: No hemorrhage, exudate, AV nicking, arteriolar narrowing or papilledema.   Neck:      Thyroid: No thyromegaly.      Vascular: No carotid bruit or JVD.      Trachea: No tracheal deviation.   Cardiovascular:      Rate and Rhythm: Normal rate and regular rhythm.      Pulses: Normal pulses.      Heart sounds: Normal heart sounds, S1 normal and S2  normal. No murmur heard.     No friction rub. No gallop.   Pulmonary:      Effort: Pulmonary effort is normal. No accessory muscle usage.      Breath sounds: Normal breath sounds. No stridor. No decreased breath sounds, wheezing, rhonchi or rales.   Abdominal:      General: Bowel sounds are normal. There is no distension.      Palpations: Abdomen is soft. Abdomen is not rigid. There is no mass or pulsatile mass.      Tenderness: There is no abdominal tenderness. There is no guarding or rebound. Negative signs include Randolph's sign.      Hernia: No hernia is present. There is no hernia in the left inguinal area.   Genitourinary:     Penis: Normal.       Testes: Normal.         Right: Mass, tenderness or swelling not present.         Left: Mass, tenderness or swelling not present.   Lymphadenopathy:      Head:      Right side of head: No submental, submandibular, tonsillar, preauricular, posterior auricular or occipital adenopathy.      Left side of head: No submental, submandibular, tonsillar, preauricular, posterior auricular or occipital adenopathy.      Cervical: No cervical adenopathy.      Lower Body: No right inguinal adenopathy. No left inguinal adenopathy.   Skin:     General: Skin is warm and dry.      Coloration: Skin is not pale.   Neurological:      Mental Status: He is alert and oriented to person, place, and time.      Cranial Nerves: No cranial nerve deficit.      Sensory: No sensory deficit.      Motor: No tremor, abnormal muscle tone or seizure activity.      Coordination: Coordination normal.      Gait: Gait normal.      Deep Tendon Reflexes: Reflexes are normal and symmetric.           Assessment & Plan      Medications        Problem List         LOS      Health maintenance.  Doing well.  [facility]  Flu vaccine declined.  Discussed health maintenance, screening tests, lifestyle modification.  Type 2 diabetes.  New onset.  Worse today, A1c 8.7 today, attempting  Ozempic/insurance not covering, metformin declined, start glipizide.  Discussed diet, exercise, lifestyle modification.  Has not been watching diet, restart, overall good control/diet controlled. Monitor blood sugars.  Consider nutritionist referral.  Follow-up 3 month.   Consider add lisinopril, plan ophthalmology referral.  Nephrolithiasis.  Improved/resolved currently. 4 mm left distal ureter per CT 4/20, multiple other stones bilateral kidneys 4 to 5 mm.  Increase fluid intake. Urology follow up declined check xray abd.   Knee pain.  Left, patellar tendinitis.  OA contributing.  IM steroids given.  Ice, NSAIDs, rehabilitation exercises discussed.  Call return if persistent symptoms.  Gallstone.  Small stone incidental finding per CT 4/20.  Asymptomatic.  Consider surgery referral if symptoms develop.  Hyperlipidemia.  Improved back on simvastatin,.  Familial with significant elevated triglycerides.  Improved on statin.  Follow-up recheck.  Fatty liver.  With mild elevation LFTs.  Continue fish oil.  Continue to monitor.  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  Recommend hematology follow-up, offered second opinion from alternative hematologist he declines currently.  Clinically stable on Claritin/ranitidine.  Lumbar disc disease.  Undergoing PT currently, considering epidural injections.  Followed by [employer].  Asthma.  Doing well, no recent flares. baseline inhaler use currently.  Hypertension.  Much improved, normotensive today home blood pressure monitor results reviewed, tolerating increase lisinopril/addition HCTZ. Monitor bp at home, close follow up scheuled.  Consider increase rx if persistent elevation.  Overall good control on carvedilol.  Cardiolite stress testing benign 2012.  Carotid Dopplers normal 2015.  Discussed low-sodium diet.  Follow-up recheck.  Anxiety.  Recommend Rx he declines.  Family history of colon cancer.  His uncle.  Remote h/o colonoscoy recommend  repeat / [facility] referral scheduled.   Groin sprain.  Benign exam today.  Ice, rehabilitation exercise discussed.  Start prednisone.  Call return if worsening symptoms.  CDL physical.  Doing well, hypertension well controlled.  A1c acceptable/8.7 working on improved blood sugar control..  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  Low back pain.  Flare L DD, ice, rehabilitation exercises discussed.  Start nightly muscle relaxant.  Consider repeat imaging if persistent symptoms.  Tinea cruris.  Start Lotrisone.    Previous Exams:  Last Carotid Dopplers was 9/11/15 ordered by Dr Noyola               · Impression of No Visible plaque or hemodynamically significant stenosis at the carotid bifurcations. Normal antegrade flow in both vertebral arteries    Last Stress Test was on 11/14/07 ordered by Dr Song.    · Impression of Negative stress test for inducible ischemia.     Last EKG was on 9/25/2018 ordered by Dr Noyola.    · Impression of Sinus Bradycardia     Last Echocardiogram was on 4/29/2012 ordered by Dr Noyola.    · Impression of left atrium is of normal size and structure.    ·The right atrium is of normal size and structure.    ·The right ventricle appears to have normal overall size thickness and contractility.    ·The left ventricle appears to have normal overall size thickness and contractility.    ·The estimated ejection fraction is 68%.    ·There are no regional wall motion abnormalities identified.    ·The aortic valve is morphologically normal.    ·There is no aortic stenosis or regurgitation present.    ·The mitral valve is morphologically normal.    ·There is trace to mild mitral regurgitation detected.    ·The tricuspid regurgitation detected however normal.    ·There is trace tricuspid regurgitation detected.    Had a dexa scan on 2/27/13       Diagnoses and all orders for this visit:    1. 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 Class 3 severe obesity due to excess calories with  serious comorbidity and body mass index (BMI) of 40.0 to 44.9 in adult    3. Tobacco use disorder      Class 2 Severe Obesity (BMI >=35 and <=39.9). Obesity-related health conditions include the following: hypertension, diabetes mellitus, and dyslipidemias. Obesity is unchanged. BMI is is above average; BMI management plan is completed. We discussed portion control and increasing exercise.          Expected course, medications, and adverse effects discussed.  Call or return if worsening or persistent symptoms.  I wore protective equipment throughout this patient encounter including a mask, gloves, and eye protection.  Hand hygiene was performed before donning protective equipment and after removal when leaving the room.  The complete contents of the assessment and plan and lab results as documented above have been reviewed and addressed by myself with the patient today as part of an ongoing evaluation / treatment plan.  If some of the documentation has been copied from a previous note and is unchanged it indicates that this problem / plan has been assessed today but is stable from a previous visit and no changes have been recommended.

## 2024-11-04 ENCOUNTER — CLINICAL SUPPORT (OUTPATIENT)
Dept: FAMILY MEDICINE CLINIC | Facility: CLINIC | Age: 49
End: 2024-11-04

## 2024-11-04 ENCOUNTER — LAB (OUTPATIENT)
Dept: LAB | Facility: HOSPITAL | Age: 49
End: 2024-11-04
Payer: COMMERCIAL

## 2024-11-04 ENCOUNTER — HOSPITAL ENCOUNTER (OUTPATIENT)
Dept: CARDIOLOGY | Facility: HOSPITAL | Age: 49
Discharge: HOME | End: 2024-11-04
Payer: COMMERCIAL

## 2024-11-04 VITALS
TEMPERATURE: 98.1 F | DIASTOLIC BLOOD PRESSURE: 82 MMHG | WEIGHT: 302 LBS | RESPIRATION RATE: 18 BRPM | HEART RATE: 81 BPM | OXYGEN SATURATION: 95 % | SYSTOLIC BLOOD PRESSURE: 126 MMHG | BODY MASS INDEX: 40.9 KG/M2 | HEIGHT: 72 IN

## 2024-11-04 DIAGNOSIS — E66.01 CLASS 3 SEVERE OBESITY DUE TO EXCESS CALORIES WITH SERIOUS COMORBIDITY AND BODY MASS INDEX (BMI) OF 40.0 TO 44.9 IN ADULT: ICD-10-CM

## 2024-11-04 DIAGNOSIS — E11.65 TYPE 2 DIABETES MELLITUS WITH HYPERGLYCEMIA, WITHOUT LONG-TERM CURRENT USE OF INSULIN: ICD-10-CM

## 2024-11-04 DIAGNOSIS — E66.813 CLASS 3 SEVERE OBESITY DUE TO EXCESS CALORIES WITH SERIOUS COMORBIDITY AND BODY MASS INDEX (BMI) OF 40.0 TO 44.9 IN ADULT: ICD-10-CM

## 2024-11-04 DIAGNOSIS — Z02.4 ENCOUNTER FOR CDL (COMMERCIAL DRIVING LICENSE) EXAM: Primary | ICD-10-CM

## 2024-11-04 DIAGNOSIS — F17.200 TOBACCO USE DISORDER: ICD-10-CM

## 2024-11-04 LAB
BILIRUB BLD-MCNC: NEGATIVE MG/DL
CLARITY, POC: CLEAR
COLOR UR: YELLOW
EXPIRATION DATE: ABNORMAL
GLUCOSE UR STRIP-MCNC: NEGATIVE MG/DL
HBA1C MFR BLD: 8.7 % (ref 4.5–5.7)
HBA1C MFR BLD: 8.96 % (ref 4.8–5.6)
KETONES UR QL: NEGATIVE
LEUKOCYTE EST, POC: NEGATIVE
Lab: ABNORMAL
NITRITE UR-MCNC: NEGATIVE MG/ML
PH UR: 6 [PH] (ref 5–8)
PROT UR STRIP-MCNC: NEGATIVE MG/DL
QT INTERVAL: 384 MS
QTC INTERVAL: 421 MS
RBC # UR STRIP: NEGATIVE /UL
SP GR UR: 1.02 (ref 1–1.03)
UROBILINOGEN UR QL: NORMAL

## 2024-11-04 PROCEDURE — 81002 URINALYSIS NONAUTO W/O SCOPE: CPT | Performed by: FAMILY MEDICINE

## 2024-11-04 PROCEDURE — 93005 ELECTROCARDIOGRAM TRACING: CPT | Performed by: STUDENT IN AN ORGANIZED HEALTH CARE EDUCATION/TRAINING PROGRAM

## 2024-11-04 PROCEDURE — 83036 HEMOGLOBIN GLYCOSYLATED A1C: CPT

## 2024-11-04 PROCEDURE — DOTPHY: Performed by: FAMILY MEDICINE

## 2024-11-04 PROCEDURE — 83036 HEMOGLOBIN GLYCOSYLATED A1C: CPT | Performed by: FAMILY MEDICINE

## 2024-11-04 RX ORDER — GLIPIZIDE 5 MG/1
5 TABLET, FILM COATED, EXTENDED RELEASE ORAL DAILY
Qty: 30 TABLET | Refills: 5 | Status: SHIPPED | OUTPATIENT
Start: 2024-11-04

## 2024-11-04 RX ORDER — SEMAGLUTIDE 1.34 MG/ML
0.25 INJECTION, SOLUTION SUBCUTANEOUS WEEKLY
Qty: 3 ML | Refills: 1 | Status: SHIPPED | OUTPATIENT
Start: 2024-11-04 | End: 2024-11-06

## 2024-11-05 ENCOUNTER — TELEPHONE (OUTPATIENT)
Dept: FAMILY MEDICINE CLINIC | Facility: CLINIC | Age: 49
End: 2024-11-05
Payer: COMMERCIAL

## 2024-11-05 DIAGNOSIS — I65.29 STENOSIS OF CAROTID ARTERY, UNSPECIFIED LATERALITY: ICD-10-CM

## 2024-11-05 DIAGNOSIS — E11.65 TYPE 2 DIABETES MELLITUS WITH HYPERGLYCEMIA, WITHOUT LONG-TERM CURRENT USE OF INSULIN: Primary | ICD-10-CM

## 2024-11-05 DIAGNOSIS — E66.01 CLASS 3 SEVERE OBESITY DUE TO EXCESS CALORIES WITH SERIOUS COMORBIDITY AND BODY MASS INDEX (BMI) OF 40.0 TO 44.9 IN ADULT: ICD-10-CM

## 2024-11-05 DIAGNOSIS — E66.813 CLASS 3 SEVERE OBESITY DUE TO EXCESS CALORIES WITH SERIOUS COMORBIDITY AND BODY MASS INDEX (BMI) OF 40.0 TO 44.9 IN ADULT: ICD-10-CM

## 2024-11-05 NOTE — TELEPHONE ENCOUNTER
Caller: Asad Odom    Relationship: Self    Best call back number: [phone]     Which medication are you concerned about: OZEMPIC    Who prescribed you this medication: PCP      What are your concerns: PATIENT CALLED STATING HIS INSURANCE WILL NOT COVER OZEMPIC AS AN INJECTABLE BUT WILL IN PILL FORM. IS THIS AN OPTION. PLEASE CALL PATIENT BACK TO ADVISE/DISCUSS

## 2024-11-05 NOTE — PAT
Message sent to Dr. Kulkarni about elevated A1c.  Awaiting response.    He said ok to proceed with surgery on 11/12/2024.

## 2024-11-06 DIAGNOSIS — I10 ESSENTIAL HYPERTENSION: ICD-10-CM

## 2024-11-06 RX ORDER — LISINOPRIL 40 MG/1
40 TABLET ORAL DAILY
Qty: 90 TABLET | Refills: 1 | Status: SHIPPED | OUTPATIENT
Start: 2024-11-06

## 2024-11-12 ENCOUNTER — HOSPITAL ENCOUNTER (OUTPATIENT)
Facility: HOSPITAL | Age: 49
Discharge: HOME | End: 2024-11-12
Attending: STUDENT IN AN ORGANIZED HEALTH CARE EDUCATION/TRAINING PROGRAM | Admitting: STUDENT IN AN ORGANIZED HEALTH CARE EDUCATION/TRAINING PROGRAM
Payer: COMMERCIAL

## 2024-11-12 ENCOUNTER — ANESTHESIA (OUTPATIENT)
Dept: PERIOP | Facility: HOSPITAL | Age: 49
End: 2024-11-12
Payer: COMMERCIAL

## 2024-11-12 ENCOUNTER — ANESTHESIA EVENT (OUTPATIENT)
Dept: PERIOP | Facility: HOSPITAL | Age: 49
End: 2024-11-12
Payer: COMMERCIAL

## 2024-11-12 VITALS
RESPIRATION RATE: 10 BRPM | HEART RATE: 78 BPM | BODY MASS INDEX: 39.9 KG/M2 | DIASTOLIC BLOOD PRESSURE: 88 MMHG | HEIGHT: 72 IN | OXYGEN SATURATION: 98 % | TEMPERATURE: 97.5 F | WEIGHT: 294.6 LBS | SYSTOLIC BLOOD PRESSURE: 133 MMHG

## 2024-11-12 DIAGNOSIS — K60.2 ANAL FISSURE: ICD-10-CM

## 2024-11-12 DIAGNOSIS — K62.89 RECTAL PAIN: Primary | ICD-10-CM

## 2024-11-12 LAB
GLUCOSE BLDC GLUCOMTR-MCNC: 190 MG/DL (ref 70–105)
GLUCOSE BLDC GLUCOMTR-MCNC: 214 MG/DL (ref 70–105)

## 2024-11-12 PROCEDURE — 25010000002 MIDAZOLAM PER 1 MG: Performed by: NURSE ANESTHETIST, CERTIFIED REGISTERED

## 2024-11-12 PROCEDURE — 25010000002 SUGAMMADEX 200 MG/2ML SOLUTION: Performed by: NURSE ANESTHETIST, CERTIFIED REGISTERED

## 2024-11-12 PROCEDURE — 25810000003 LACTATED RINGERS PER 1000 ML: Performed by: NURSE ANESTHETIST, CERTIFIED REGISTERED

## 2024-11-12 PROCEDURE — 82948 REAGENT STRIP/BLOOD GLUCOSE: CPT

## 2024-11-12 PROCEDURE — 25010000002 LIDOCAINE PF 1% 1 % SOLUTION: Performed by: NURSE ANESTHETIST, CERTIFIED REGISTERED

## 2024-11-12 PROCEDURE — 25010000002 EPINEPHRINE 1 MG/10ML SOLUTION PREFILLED SYRINGE: Performed by: STUDENT IN AN ORGANIZED HEALTH CARE EDUCATION/TRAINING PROGRAM

## 2024-11-12 PROCEDURE — 25010000002 HYDROMORPHONE 1 MG/ML SOLUTION: Performed by: NURSE ANESTHETIST, CERTIFIED REGISTERED

## 2024-11-12 PROCEDURE — 25010000002 PROPOFOL 200 MG/20ML EMULSION: Performed by: NURSE ANESTHETIST, CERTIFIED REGISTERED

## 2024-11-12 PROCEDURE — 46200 REMOVAL OF ANAL FISSURE: CPT | Performed by: STUDENT IN AN ORGANIZED HEALTH CARE EDUCATION/TRAINING PROGRAM

## 2024-11-12 PROCEDURE — 25010000002 DEXAMETHASONE PER 1 MG: Performed by: NURSE ANESTHETIST, CERTIFIED REGISTERED

## 2024-11-12 PROCEDURE — 25810000003 LACTATED RINGERS PER 1000 ML: Performed by: ANESTHESIOLOGY

## 2024-11-12 PROCEDURE — 25010000002 ONDANSETRON PER 1 MG: Performed by: NURSE ANESTHETIST, CERTIFIED REGISTERED

## 2024-11-12 PROCEDURE — 25010000002 LIDOCAINE 1% - EPINEPHRINE 1:100000 1 %-1:100000 SOLUTION: Performed by: STUDENT IN AN ORGANIZED HEALTH CARE EDUCATION/TRAINING PROGRAM

## 2024-11-12 PROCEDURE — 25010000002 MAGNESIUM SULFATE PER 500 MG OF MAGNESIUM: Performed by: NURSE ANESTHETIST, CERTIFIED REGISTERED

## 2024-11-12 PROCEDURE — 25010000002 SUCCINYLCHOLINE PER 20 MG: Performed by: NURSE ANESTHETIST, CERTIFIED REGISTERED

## 2024-11-12 DEVICE — ABSORBABLE HEMOSTAT (OXIDIZED REGENERATED CELLULOSE, U.S.P.)
Type: IMPLANTABLE DEVICE | Site: RECTUM | Status: FUNCTIONAL
Brand: SURGICEL

## 2024-11-12 RX ORDER — POVIDONE-IODINE 10 MG/G
OINTMENT TOPICAL AS NEEDED
Status: DISCONTINUED | OUTPATIENT
Start: 2024-11-12 | End: 2024-11-12 | Stop reason: HOSPADM

## 2024-11-12 RX ORDER — LIDOCAINE HYDROCHLORIDE 10 MG/ML
INJECTION, SOLUTION EPIDURAL; INFILTRATION; INTRACAUDAL; PERINEURAL AS NEEDED
Status: DISCONTINUED | OUTPATIENT
Start: 2024-11-12 | End: 2024-11-12 | Stop reason: SURG

## 2024-11-12 RX ORDER — PROPOFOL 10 MG/ML
INJECTION, EMULSION INTRAVENOUS AS NEEDED
Status: DISCONTINUED | OUTPATIENT
Start: 2024-11-12 | End: 2024-11-12 | Stop reason: SURG

## 2024-11-12 RX ORDER — FENTANYL CITRATE 50 UG/ML
50 INJECTION, SOLUTION INTRAMUSCULAR; INTRAVENOUS
Status: DISCONTINUED | OUTPATIENT
Start: 2024-11-12 | End: 2024-11-12 | Stop reason: HOSPADM

## 2024-11-12 RX ORDER — HYDRALAZINE HYDROCHLORIDE 20 MG/ML
5 INJECTION INTRAMUSCULAR; INTRAVENOUS
Status: DISCONTINUED | OUTPATIENT
Start: 2024-11-12 | End: 2024-11-12 | Stop reason: HOSPADM

## 2024-11-12 RX ORDER — DIPHENHYDRAMINE HCL 25 MG
25 CAPSULE ORAL
Status: DISCONTINUED | OUTPATIENT
Start: 2024-11-12 | End: 2024-11-12 | Stop reason: HOSPADM

## 2024-11-12 RX ORDER — LABETALOL HYDROCHLORIDE 5 MG/ML
5 INJECTION, SOLUTION INTRAVENOUS
Status: DISCONTINUED | OUTPATIENT
Start: 2024-11-12 | End: 2024-11-12 | Stop reason: HOSPADM

## 2024-11-12 RX ORDER — SODIUM CHLORIDE, SODIUM LACTATE, POTASSIUM CHLORIDE, CALCIUM CHLORIDE 600; 310; 30; 20 MG/100ML; MG/100ML; MG/100ML; MG/100ML
INJECTION, SOLUTION INTRAVENOUS CONTINUOUS PRN
Status: DISCONTINUED | OUTPATIENT
Start: 2024-11-12 | End: 2024-11-12 | Stop reason: SURG

## 2024-11-12 RX ORDER — PANTOPRAZOLE SODIUM 40 MG/10ML
40 INJECTION, POWDER, LYOPHILIZED, FOR SOLUTION INTRAVENOUS ONCE
Status: COMPLETED | OUTPATIENT
Start: 2024-11-12 | End: 2024-11-12

## 2024-11-12 RX ORDER — OXYCODONE HYDROCHLORIDE 5 MG/1
10 TABLET ORAL ONCE AS NEEDED
Status: COMPLETED | OUTPATIENT
Start: 2024-11-12 | End: 2024-11-12

## 2024-11-12 RX ORDER — SUCCINYLCHOLINE CHLORIDE 20 MG/ML
INJECTION INTRAMUSCULAR; INTRAVENOUS AS NEEDED
Status: DISCONTINUED | OUTPATIENT
Start: 2024-11-12 | End: 2024-11-12 | Stop reason: SURG

## 2024-11-12 RX ORDER — MAGNESIUM SULFATE HEPTAHYDRATE 500 MG/ML
INJECTION, SOLUTION INTRAMUSCULAR; INTRAVENOUS AS NEEDED
Status: DISCONTINUED | OUTPATIENT
Start: 2024-11-12 | End: 2024-11-12 | Stop reason: SURG

## 2024-11-12 RX ORDER — PROMETHAZINE HYDROCHLORIDE 25 MG/1
25 SUPPOSITORY RECTAL ONCE AS NEEDED
Status: DISCONTINUED | OUTPATIENT
Start: 2024-11-12 | End: 2024-11-12 | Stop reason: HOSPADM

## 2024-11-12 RX ORDER — ROCURONIUM BROMIDE 10 MG/ML
INJECTION, SOLUTION INTRAVENOUS AS NEEDED
Status: DISCONTINUED | OUTPATIENT
Start: 2024-11-12 | End: 2024-11-12 | Stop reason: SURG

## 2024-11-12 RX ORDER — SODIUM CHLORIDE 0.9 % (FLUSH) 0.9 %
10 SYRINGE (ML) INJECTION AS NEEDED
Status: DISCONTINUED | OUTPATIENT
Start: 2024-11-12 | End: 2024-11-12 | Stop reason: HOSPADM

## 2024-11-12 RX ORDER — LIDOCAINE HYDROCHLORIDE AND EPINEPHRINE 10; 10 MG/ML; UG/ML
INJECTION, SOLUTION INFILTRATION; PERINEURAL AS NEEDED
Status: DISCONTINUED | OUTPATIENT
Start: 2024-11-12 | End: 2024-11-12 | Stop reason: HOSPADM

## 2024-11-12 RX ORDER — MIDAZOLAM HYDROCHLORIDE 1 MG/ML
INJECTION, SOLUTION INTRAMUSCULAR; INTRAVENOUS AS NEEDED
Status: DISCONTINUED | OUTPATIENT
Start: 2024-11-12 | End: 2024-11-12 | Stop reason: SURG

## 2024-11-12 RX ORDER — SODIUM CHLORIDE 0.9 % (FLUSH) 0.9 %
3-10 SYRINGE (ML) INJECTION AS NEEDED
Status: DISCONTINUED | OUTPATIENT
Start: 2024-11-12 | End: 2024-11-12 | Stop reason: HOSPADM

## 2024-11-12 RX ORDER — DEXMEDETOMIDINE HYDROCHLORIDE 100 UG/ML
INJECTION, SOLUTION INTRAVENOUS AS NEEDED
Status: DISCONTINUED | OUTPATIENT
Start: 2024-11-12 | End: 2024-11-12 | Stop reason: SURG

## 2024-11-12 RX ORDER — SODIUM CHLORIDE, SODIUM LACTATE, POTASSIUM CHLORIDE, CALCIUM CHLORIDE 600; 310; 30; 20 MG/100ML; MG/100ML; MG/100ML; MG/100ML
1000 INJECTION, SOLUTION INTRAVENOUS ONCE
Status: COMPLETED | OUTPATIENT
Start: 2024-11-12 | End: 2024-11-12

## 2024-11-12 RX ORDER — PROMETHAZINE HYDROCHLORIDE 25 MG/1
25 TABLET ORAL ONCE AS NEEDED
Status: DISCONTINUED | OUTPATIENT
Start: 2024-11-12 | End: 2024-11-12 | Stop reason: HOSPADM

## 2024-11-12 RX ORDER — DIPHENHYDRAMINE HYDROCHLORIDE 50 MG/ML
12.5 INJECTION INTRAMUSCULAR; INTRAVENOUS
Status: DISCONTINUED | OUTPATIENT
Start: 2024-11-12 | End: 2024-11-12 | Stop reason: HOSPADM

## 2024-11-12 RX ORDER — ACETAMINOPHEN 325 MG/1
650 TABLET ORAL ONCE AS NEEDED
Status: DISCONTINUED | OUTPATIENT
Start: 2024-11-12 | End: 2024-11-12 | Stop reason: HOSPADM

## 2024-11-12 RX ORDER — ONDANSETRON 2 MG/ML
INJECTION INTRAMUSCULAR; INTRAVENOUS AS NEEDED
Status: DISCONTINUED | OUTPATIENT
Start: 2024-11-12 | End: 2024-11-12 | Stop reason: SURG

## 2024-11-12 RX ORDER — DEXAMETHASONE SODIUM PHOSPHATE 4 MG/ML
INJECTION, SOLUTION INTRA-ARTICULAR; INTRALESIONAL; INTRAMUSCULAR; INTRAVENOUS; SOFT TISSUE AS NEEDED
Status: DISCONTINUED | OUTPATIENT
Start: 2024-11-12 | End: 2024-11-12 | Stop reason: SURG

## 2024-11-12 RX ORDER — OXYCODONE HYDROCHLORIDE 5 MG/1
5 TABLET ORAL EVERY 8 HOURS PRN
Qty: 35 TABLET | Refills: 0 | Status: SHIPPED | OUTPATIENT
Start: 2024-11-12 | End: 2024-12-17

## 2024-11-12 RX ORDER — SODIUM CHLORIDE 0.9 % (FLUSH) 0.9 %
3 SYRINGE (ML) INJECTION EVERY 12 HOURS SCHEDULED
Status: DISCONTINUED | OUTPATIENT
Start: 2024-11-12 | End: 2024-11-12 | Stop reason: HOSPADM

## 2024-11-12 RX ORDER — LIDOCAINE HYDROCHLORIDE 10 MG/ML
0.5 INJECTION, SOLUTION EPIDURAL; INFILTRATION; INTRACAUDAL; PERINEURAL ONCE AS NEEDED
Status: DISCONTINUED | OUTPATIENT
Start: 2024-11-12 | End: 2024-11-12 | Stop reason: HOSPADM

## 2024-11-12 RX ADMIN — DEXMEDETOMIDINE HYDROCHLORIDE 4 MCG: 100 INJECTION, SOLUTION INTRAVENOUS at 14:25

## 2024-11-12 RX ADMIN — ROCURONIUM BROMIDE 50 MG: 10 INJECTION, SOLUTION INTRAVENOUS at 14:22

## 2024-11-12 RX ADMIN — HYDROMORPHONE HYDROCHLORIDE 1 MG: 1 INJECTION, SOLUTION INTRAMUSCULAR; INTRAVENOUS; SUBCUTANEOUS at 14:22

## 2024-11-12 RX ADMIN — SODIUM CHLORIDE, SODIUM LACTATE, POTASSIUM CHLORIDE, AND CALCIUM CHLORIDE: .6; .31; .03; .02 INJECTION, SOLUTION INTRAVENOUS at 14:17

## 2024-11-12 RX ADMIN — ONDANSETRON 4 MG: 2 INJECTION, SOLUTION INTRAMUSCULAR; INTRAVENOUS at 14:31

## 2024-11-12 RX ADMIN — SUGAMMADEX 100 MG: 100 INJECTION, SOLUTION INTRAVENOUS at 15:09

## 2024-11-12 RX ADMIN — HYDROMORPHONE HYDROCHLORIDE 0.5 MG: 1 INJECTION, SOLUTION INTRAMUSCULAR; INTRAVENOUS; SUBCUTANEOUS at 15:57

## 2024-11-12 RX ADMIN — SODIUM CHLORIDE, POTASSIUM CHLORIDE, SODIUM LACTATE AND CALCIUM CHLORIDE 1000 ML: 600; 310; 30; 20 INJECTION, SOLUTION INTRAVENOUS at 12:17

## 2024-11-12 RX ADMIN — DEXMEDETOMIDINE HYDROCHLORIDE 4 MCG: 100 INJECTION, SOLUTION INTRAVENOUS at 14:40

## 2024-11-12 RX ADMIN — DEXMEDETOMIDINE HYDROCHLORIDE 4 MCG: 100 INJECTION, SOLUTION INTRAVENOUS at 14:48

## 2024-11-12 RX ADMIN — LIDOCAINE HYDROCHLORIDE 50 MG: 10 INJECTION, SOLUTION EPIDURAL; INFILTRATION; INTRACAUDAL; PERINEURAL at 14:22

## 2024-11-12 RX ADMIN — SUGAMMADEX 200 MG: 100 INJECTION, SOLUTION INTRAVENOUS at 15:08

## 2024-11-12 RX ADMIN — OXYCODONE 10 MG: 5 TABLET ORAL at 15:58

## 2024-11-12 RX ADMIN — DEXAMETHASONE SODIUM PHOSPHATE 8 MG: 4 INJECTION, SOLUTION INTRAMUSCULAR; INTRAVENOUS at 14:31

## 2024-11-12 RX ADMIN — MIDAZOLAM 2 MG: 1 INJECTION INTRAMUSCULAR; INTRAVENOUS at 14:19

## 2024-11-12 RX ADMIN — PROPOFOL 200 MG: 10 INJECTION, EMULSION INTRAVENOUS at 14:22

## 2024-11-12 RX ADMIN — HYDROMORPHONE HYDROCHLORIDE 0.5 MG: 1 INJECTION, SOLUTION INTRAMUSCULAR; INTRAVENOUS; SUBCUTANEOUS at 16:19

## 2024-11-12 RX ADMIN — SUCCINYLCHOLINE CHLORIDE 100 MG: 20 INJECTION, SOLUTION INTRAMUSCULAR; INTRAVENOUS at 14:22

## 2024-11-12 RX ADMIN — PANTOPRAZOLE SODIUM 40 MG: 40 INJECTION, POWDER, FOR SOLUTION INTRAVENOUS at 12:17

## 2024-11-12 RX ADMIN — DEXMEDETOMIDINE HYDROCHLORIDE 4 MCG: 100 INJECTION, SOLUTION INTRAVENOUS at 14:31

## 2024-11-12 RX ADMIN — MAGNESIUM SULFATE HEPTAHYDRATE 2 G: 500 INJECTION, SOLUTION INTRAMUSCULAR; INTRAVENOUS at 14:22

## 2024-11-12 RX ADMIN — DEXMEDETOMIDINE HYDROCHLORIDE 4 MCG: 100 INJECTION, SOLUTION INTRAVENOUS at 15:04

## 2024-11-12 NOTE — ANESTHESIA POSTPROCEDURE EVALUATION
Patient: Asad Odom    Procedure Summary       Date: 11/12/24 Room / Location: [facility] OR 08 / [facility] MAIN OR    Anesthesia Start: 1417 Anesthesia Stop: 1519    Procedure: SPHINCTEROTOMY (Rectum) Diagnosis:       Anal fissure      (Anal fissure [K60.2])    Surgeons: Luis Kulkarni MD Provider: Cornelia Franklin MD    Anesthesia Type: general ASA Status: 3            Anesthesia Type: general    Vitals  Vitals Value Taken Time   /92 11/12/24 1603   Temp 97.6 °F (36.4 °C) 11/12/24 1515   Pulse 82 11/12/24 1605   Resp 10 11/12/24 1525   SpO2 95 % 11/12/24 1605   Vitals shown include unfiled device data.        Post Anesthesia Care and Evaluation    Patient location during evaluation: PACU  Patient participation: complete - patient participated  Level of consciousness: awake  Pain scale: See nurse's notes for pain score.  Pain management: adequate    Airway patency: patent  Anesthetic complications: No anesthetic complications  PONV Status: none  Cardiovascular status: acceptable  Respiratory status: acceptable and spontaneous ventilation  Hydration status: acceptable    Comments: Patient seen and examined postoperatively; vital signs stable; SpO2 greater than or equal to 90%; cardiopulmonary status stable; nausea/vomiting adequately controlled; pain adequately controlled; no apparent anesthesia complications; patient discharged from anesthesia care when discharge criteria were met

## 2024-11-12 NOTE — ANESTHESIA PROCEDURE NOTES
Airway  Urgency: elective    Date/Time: 11/12/2024 2:25 PM  Difficult airway    General Information and Staff    Patient location during procedure: OR  Anesthesiologist: Cornelia Franklin MD  CRNA/CAA: Norah Alatorre CRNA    Indications and Patient Condition  Indications for airway management: airway protection    Preoxygenated: yes  MILS maintained throughout  Mask difficulty assessment: 0 - not attempted    Final Airway Details  Final airway type: endotracheal airway      Successful airway: ETT  Cuffed: yes   Successful intubation technique: video laryngoscopy  Endotracheal tube insertion site: oral  Blade: Pimentel  Blade size: 3  ETT size (mm): 7.5  Cormack-Lehane Classification: grade I - full view of glottis  Placement verified by: chest auscultation and capnometry   Measured from: lips  ETT/EBT  to lips (cm): 23  Number of attempts at approach: 1  Assessment: lips, teeth, and gum same as pre-op and atraumatic intubation

## 2024-11-12 NOTE — OP NOTE
[facility] Operative Note   Name: Asad Odom  : 1975    Date of Surgery: 2024  Pre-op Diagnosis: Anal fissure [K60.2]   Post-op Diagnosis: same  Procedure:   lateral internal sphincterotomy  Fissurectomy   Surgeon: Luis Kulkarni. MD   Assistants: TIMBO  Anesthesia: Local/ General  IV Fluids: refer to anesthesia record  Estimated Blood Loss: minimal  Drains: none  Implants: none  Specimen: none     Complications:   No complications  Findings     1. A posterior midline anal fissure was noted on examination. A left lateral internal sphincterotomy was performed.    Operative Procedure   After appropriate consent including risks, benefits and alternatives, the patient was brought into the operating suite, and anesthesia administered.   The patient was placed in prone position, all uriel prominences were adequately padded. Patient was then prepped and drapped in the usual sterile fashion.   A local anal block was then performed.  Digital rectal examination revealed a hypertrophied internal anal sphincter. No other masses were palpable. The Calvo retractor was placed and a posterior anal fissure was noted, extending to the dentate line.   A bivalve retractor was placed in the anal canal and an 11-blade was used to make an incision in the intersphincteric groove in the right lateral region. The knife was then rotated and used to divide the internal sphincter to the level of the dentate line.  Repeat palpation ensured no bands of fibers remained. Digital pressure was applied and hemostasis obtained.   The base of the fissure was excised and cauterized.  Dressings were then applied and the case concluded.  The patient tolerated the procedure well and was taken to the recovery room in stable condition.      Counts: Instrument, sponge, and needle counts were reported to be correct prior to closure and at the conclusion of the case.    Disposition  The patient was taken to Recovery Room in good condition.      Luis Kulkarni  MD  Colon and Rectal Surgery   [facility]

## 2024-11-12 NOTE — DISCHARGE INSTRUCTIONS
[facility]  Postoperative Instructions after Anorectal Surgery    After your surgery, the following may occur and SHOULD NOT alarm you:   a) Drainage of bloody discharge (a tablespoonful or less)   b) Some swelling around the anus   c) Soreness, burning, itching, or dull aching   d) Pain with bowel movement   e) Occasional passage of bright red blood   f) Mild fatigue   g) Mild fever (less than 101.4)   h) odor.    Diet:   1. Eat a regular high fiber diet with a considerable amount of cooked vegetables, fruits and fruit juices.  2. Avoid spicy foods.  3. Drink greater than 64 ounces of water or liquids without caffeine daily.    Wound Care:  1. Use ice packs for the first two days following surgery if you feel that it helps your discomfort.  2. Take warm soaks/sitzbaths/showers 1-2 times a day, water as warm as can be tolerated.  3. Expect bleeding/drainage with bowel movements, so wear pads to protect underwear.    Pain Meds:  1. Motrin 600mg every 6 hours as needed  2. Narcotic pain medication as prescribed  3. If nauseated or vomiting, take phenergan or zofran as prescribed (call my office for a prescription).  Try to stay hydrated.  If nausea persists, you need to be seen in the office.   4. Do not drive while taking narcotics    Constipated:   1. To avoid constipation:   A. Take Fiber (example: metamucil) 4 grams daily   B. Miralax 17 grams nightly as needed   C. Milk of Magnesia 2 tablespoons am/pm as needed  2. If bowel movements become too loose, stop MOM but stay on fiber supplements.    No Bowel Movements:  1. If unable to have a bowel movement in 36-48 hours:   A. Take 3 Dulcolax laxative tablets   B. If no bowel movement within 4-6 hours after Dulcolax, take 5 ounces of Magnesium Citrate    If Unable to Urinate:  1. Try to urinate in a hot shower or bath.   2. If still unable to urinate, go to the Emergency Room to have a catheter placed.  The doctor in the office will remove  this catheter in a few days.     When to Call Us:  1. Fever higher than 101.5  2. Persistent nausea and/or vomiting  3. Excessive bleeding (bloody diarrhea).  It is normal to pass some blood with bowel movements but passing a cup of blood at a time is abnormal.  4. Increasing pain not relieved with above instructions.     Follow up:   Generally in 2-3 weeks unless otherwise directed.  Please call to make an appointment.         Luis Kulkarni MD  Colon and Rectal Surgery   [facility]

## 2024-11-12 NOTE — ANESTHESIA PREPROCEDURE EVALUATION
Anesthesia Evaluation     Patient summary reviewed   NPO Solid Status: > 8 hours  NPO Liquid Status: > 8 hours           Airway   Mallampati: III  TM distance: >3 FB  Neck ROM: full  Dental - normal exam     Pulmonary - normal exam   (+) asthma,shortness of breath  Cardiovascular - normal exam    ECG reviewed    (+) hypertension, hyperlipidemia,  carotid artery disease      Neuro/Psych  (+) headaches, psychiatric history Anxiety  GI/Hepatic/Renal/Endo    (+) morbid obesity, GERD, renal disease- stones, diabetes mellitus type 2    Musculoskeletal     Abdominal    Substance History      OB/GYN          Other   arthritis, blood dyscrasia,     ROS/Med Hx Other: mastocytosis                Anesthesia Plan    ASA 3     general     intravenous induction     Anesthetic plan, risks, benefits, and alternatives have been provided, discussed and informed consent has been obtained with: patient.    Plan discussed with CRNA.    CODE STATUS:

## 2024-11-13 ENCOUNTER — TELEPHONE (OUTPATIENT)
Age: 49
End: 2024-11-13
Payer: COMMERCIAL

## 2024-11-13 NOTE — TELEPHONE ENCOUNTER
P/o call   Patient is doing well and experiencing little to no pain. Patient was told if he has nay concerns to contact the office. He is [facility] for 12/13 1030 for a p/o

## 2024-11-18 ENCOUNTER — APPOINTMENT (OUTPATIENT)
Dept: CT IMAGING | Facility: HOSPITAL | Age: 49
End: 2024-11-18
Payer: COMMERCIAL

## 2024-11-18 ENCOUNTER — HOSPITAL ENCOUNTER (EMERGENCY)
Facility: HOSPITAL | Age: 49
Discharge: HOME | End: 2024-11-18
Attending: EMERGENCY MEDICINE | Admitting: EMERGENCY MEDICINE
Payer: COMMERCIAL

## 2024-11-18 VITALS
TEMPERATURE: 98.1 F | DIASTOLIC BLOOD PRESSURE: 88 MMHG | BODY MASS INDEX: 40.1 KG/M2 | OXYGEN SATURATION: 97 % | WEIGHT: 296.08 LBS | HEIGHT: 72 IN | SYSTOLIC BLOOD PRESSURE: 143 MMHG | RESPIRATION RATE: 20 BRPM | HEART RATE: 91 BPM

## 2024-11-18 DIAGNOSIS — K62.89 ANAL PAIN: Primary | ICD-10-CM

## 2024-11-18 DIAGNOSIS — K62.89 RECTAL PAIN: ICD-10-CM

## 2024-11-18 LAB
ANION GAP SERPL CALCULATED.3IONS-SCNC: 7.8 MMOL/L (ref 5–15)
BASOPHILS # BLD AUTO: 0.06 10*3/MM3 (ref 0–0.2)
BASOPHILS NFR BLD AUTO: 0.6 % (ref 0–1.5)
BUN SERPL-MCNC: 16 MG/DL (ref 6–20)
BUN/CREAT SERPL: 15.2 (ref 7–25)
CALCIUM SPEC-SCNC: 9.4 MG/DL (ref 8.6–10.5)
CHLORIDE SERPL-SCNC: 100 MMOL/L (ref 98–107)
CO2 SERPL-SCNC: 31.2 MMOL/L (ref 22–29)
CREAT SERPL-MCNC: 1.05 MG/DL (ref 0.76–1.27)
DEPRECATED RDW RBC AUTO: 41 FL (ref 37–54)
EGFRCR SERPLBLD CKD-EPI 2021: 86.5 ML/MIN/1.73
EOSINOPHIL # BLD AUTO: 0.07 10*3/MM3 (ref 0–0.4)
EOSINOPHIL NFR BLD AUTO: 0.7 % (ref 0.3–6.2)
ERYTHROCYTE [DISTWIDTH] IN BLOOD BY AUTOMATED COUNT: 12.5 % (ref 12.3–15.4)
GLUCOSE SERPL-MCNC: 240 MG/DL (ref 65–99)
HCT VFR BLD AUTO: 43.3 % (ref 37.5–51)
HGB BLD-MCNC: 14.8 G/DL (ref 13–17.7)
HOLD SPECIMEN: NORMAL
IMM GRANULOCYTES # BLD AUTO: 0.04 10*3/MM3 (ref 0–0.05)
IMM GRANULOCYTES NFR BLD AUTO: 0.4 % (ref 0–0.5)
LYMPHOCYTES # BLD AUTO: 1.92 10*3/MM3 (ref 0.7–3.1)
LYMPHOCYTES NFR BLD AUTO: 20.3 % (ref 19.6–45.3)
MCH RBC QN AUTO: 30.6 PG (ref 26.6–33)
MCHC RBC AUTO-ENTMCNC: 34.2 G/DL (ref 31.5–35.7)
MCV RBC AUTO: 89.5 FL (ref 79–97)
MONOCYTES # BLD AUTO: 0.96 10*3/MM3 (ref 0.1–0.9)
MONOCYTES NFR BLD AUTO: 10.1 % (ref 5–12)
NEUTROPHILS NFR BLD AUTO: 6.41 10*3/MM3 (ref 1.7–7)
NEUTROPHILS NFR BLD AUTO: 67.9 % (ref 42.7–76)
NRBC BLD AUTO-RTO: 0 /100 WBC (ref 0–0.2)
PLATELET # BLD AUTO: 186 10*3/MM3 (ref 140–450)
PMV BLD AUTO: 8.5 FL (ref 6–12)
POTASSIUM SERPL-SCNC: 4.1 MMOL/L (ref 3.5–5.2)
RBC # BLD AUTO: 4.84 10*6/MM3 (ref 4.14–5.8)
SODIUM SERPL-SCNC: 139 MMOL/L (ref 136–145)
WBC NRBC COR # BLD AUTO: 9.46 10*3/MM3 (ref 3.4–10.8)

## 2024-11-18 PROCEDURE — 25510000001 IOPAMIDOL PER 1 ML: Performed by: EMERGENCY MEDICINE

## 2024-11-18 PROCEDURE — 85025 COMPLETE CBC W/AUTO DIFF WBC: CPT | Performed by: EMERGENCY MEDICINE

## 2024-11-18 PROCEDURE — 96375 TX/PRO/DX INJ NEW DRUG ADDON: CPT

## 2024-11-18 PROCEDURE — 74177 CT ABD & PELVIS W/CONTRAST: CPT

## 2024-11-18 PROCEDURE — 96374 THER/PROPH/DIAG INJ IV PUSH: CPT

## 2024-11-18 PROCEDURE — 99285 EMERGENCY DEPT VISIT HI MDM: CPT

## 2024-11-18 PROCEDURE — 80048 BASIC METABOLIC PNL TOTAL CA: CPT | Performed by: EMERGENCY MEDICINE

## 2024-11-18 PROCEDURE — 25010000002 HYDROMORPHONE 1 MG/ML SOLUTION: Performed by: EMERGENCY MEDICINE

## 2024-11-18 PROCEDURE — 25010000002 ONDANSETRON PER 1 MG: Performed by: EMERGENCY MEDICINE

## 2024-11-18 RX ORDER — ONDANSETRON 2 MG/ML
4 INJECTION INTRAMUSCULAR; INTRAVENOUS ONCE
Status: COMPLETED | OUTPATIENT
Start: 2024-11-18 | End: 2024-11-18

## 2024-11-18 RX ORDER — SODIUM CHLORIDE 0.9 % (FLUSH) 0.9 %
10 SYRINGE (ML) INJECTION AS NEEDED
Status: DISCONTINUED | OUTPATIENT
Start: 2024-11-18 | End: 2024-11-18 | Stop reason: HOSPADM

## 2024-11-18 RX ORDER — IOPAMIDOL 755 MG/ML
100 INJECTION, SOLUTION INTRAVASCULAR
Status: COMPLETED | OUTPATIENT
Start: 2024-11-18 | End: 2024-11-18

## 2024-11-18 RX ADMIN — IOPAMIDOL 100 ML: 755 INJECTION, SOLUTION INTRAVENOUS at 14:00

## 2024-11-18 RX ADMIN — HYDROMORPHONE HYDROCHLORIDE 1 MG: 1 INJECTION, SOLUTION INTRAMUSCULAR; INTRAVENOUS; SUBCUTANEOUS at 14:59

## 2024-11-18 RX ADMIN — ONDANSETRON 4 MG: 2 INJECTION, SOLUTION INTRAMUSCULAR; INTRAVENOUS at 14:59

## 2024-11-18 NOTE — ED PROVIDER NOTES
Subjective   History of Present Illness  49-year-old male who is around 6 days postop from anal surgery for anal fissure who states over the last couple days had increasing pain and feeling chilled and feeling some increased swelling in the region.  He reports no vomiting or bleeding.  Review of Systems    Past Medical History:   Diagnosis Date    Annual visit for general adult medical examination with abnormal findings 10/16/2019    Anxiety     Arthritis     Asthma     Diabetes mellitus     Headache     History of tobacco use 10/16/2019    Hyperlipidemia     Hypertension     Injury of back     Mastocytosis     Overweight 10/16/2019    Shortness of breath     Urinary tract infection        Allergies   Allergen Reactions    Symbicort [Budesonide-Formoterol Fumarate] Myalgia       Past Surgical History:   Procedure Laterality Date    BONE BIOPSY      BONE MARROW BIOPSY      CYSTOSCOPY BLADDER STONE LITHOTRIPSY      KNEE MENISCAL REPAIR Left     MANDIBLE FRACTURE SURGERY      RECTAL SPHINCTEROPLASTY N/A 11/12/2024    Procedure: SPHINCTEROTOMY;  Surgeon: Luis Kulkarni MD;  Location: [facility] MAIN OR;  Service: General;  Laterality: N/A;       Family History   Problem Relation Age of Onset    Other Mother         suicide    Other Sister         Suicide sibilingd       Social History     Socioeconomic History    Marital status:    Tobacco Use    Smoking status: Former     Current packs/day: 0.25     Average packs/day: 0.3 packs/day for 33.9 years (8.5 ttl pk-yrs)     Types: Cigarettes, Cigars     Start date: 1/1/1991     Passive exposure: Never    Smokeless tobacco: Current     Types: Chew    Tobacco comments:     Quit 7/16/19, but now chews off and on    Vaping Use    Vaping status: Never Used   Substance and Sexual Activity    Alcohol use: Yes     Comment: occasional    Drug use: No    Sexual activity: Defer       Prior to Admission medications    Medication Sig Start Date End Date Taking? Authorizing Provider    amoxicillin-clavulanate (AUGMENTIN) 875-125 MG per tablet Take 1 tablet by mouth 2 (Two) Times a Day.  Patient not taking: Reported on 10/23/2024 8/13/24   Yury Watts MD   aspirin 81 MG tablet Take 1 tablet by mouth.    Chato Galicia MD   carvedilol (COREG) 25 MG tablet TAKE 1 TABLET BY MOUTH TWICE DAILY WITH MEALS 9/20/24   Pablo Noyola MD   cetirizine (zyrTEC) 5 MG tablet Take 1 tablet by mouth. 4/27/16   Chato Galicia MD   CINNAMON PO Take  by mouth.    Chato Galicia MD   clotrimazole-betamethasone (LOTRISONE) 1-0.05 % cream Apply 1 Application topically to the appropriate area as directed 2 (Two) Times a Day As Needed (rash). 3/19/24   Pablo Noyola MD   cyclobenzaprine (FLEXERIL) 10 MG tablet Take 0.5-1 tablets by mouth Daily As Needed for Muscle Spasms. 7/24/24   Pablo Noyola MD   diphenhydrAMINE (BENADRYL) 25 mg capsule Take 1 capsule by mouth Every 6 (Six) Hours As Needed for Itching.    Chato Galicia MD   docusate sodium (COLACE) 50 MG capsule Take 1 capsule by mouth 2 (Two) Times a Day.    Chato Galicia MD   famotidine (PEPCID) 20 MG tablet Take 1 tablet by mouth 2 (Two) Times a Day.    Chato Galicia MD   fenofibrate (TRICOR) 145 MG tablet Take 1 tablet by mouth once daily 10/16/24   Pablo Noyola MD   GARLIC PO Take  by mouth.    Chato Galicia MD   glipizide (glipiZIDE XL) 5 MG ER tablet Take 1 tablet by mouth Daily. 11/4/24   Pablo Noyola MD   hydroCHLOROthiazide 25 MG tablet Take 1 tablet by mouth once daily 10/16/24   Pablo Noyola MD   Hydrocort-Pramoxine, Perianal, (Analpram HC) 2.5-1 % rectal cream Insert  into the rectum 3 (Three) Times a Day. 9/20/24   Luis Kulkarni MD   hydrocortisone 1 % cream Apply 1 Application topically to the appropriate area as directed 2 (Two) Times a Day. 6/26/24   Pablo Noyola MD   lidocaine (XYLOCAINE) 2 % jelly 0.5% NIFEDIPINE WITH 2% LIDOCAINE , APPLY TO RECTUM , 3-4 TIMES DAILY  "10/15/24 10/15/25  Luis Kulkarni MD   lisinopril (PRINIVIL,ZESTRIL) 40 MG tablet Take 1 tablet by mouth once daily 11/6/24   Pablo Noyola MD   Misc Natural Products (BEET ROOT PO) Take  by mouth.    Provider, MD Chato   multivitamin with minerals tablet tablet Take 1 tablet by mouth Daily.    Provider, MD Chato   oxyCODONE (Roxicodone) 5 MG immediate release tablet Take 1 tablet by mouth Every 8 (Eight) Hours As Needed for Moderate Pain for up to 35 days. 11/12/24 12/17/24  Luis Kulkarni MD   Semaglutide 3 MG tablet Take 1 tablet by mouth Daily. 11/6/24   Pablo Noyola MD   simvastatin (ZOCOR) 40 MG tablet TAKE 1 TABLET BY MOUTH ONCE DAILY AT NIGHT 9/20/24   Pablo Noyola MD     /78   Pulse 87   Temp 98.1 °F (36.7 °C) (Oral)   Resp 20   Ht 182.9 cm (72\")   Wt 134 kg (296 lb 1.2 oz)   SpO2 97%   BMI 40.16 kg/m²       Objective   Physical Exam  General: Well-developed well-appearing, no acute distress, alert and appropriate  Eyes:  sclera nonicteric  HEENT: Mucous membranes moist, no mucosal swelling  Respirations: Respirations nonlabored, equal breath sounds bilaterally, clear lungs  Heart regular rate and rhythm,   Abdomen soft nontender nondistended, no hepatosplenomegaly, no hernia, no mass, normal bowel sounds, anus: There is some erythema and induration and tenderness of the skin on the left side of the gluteal cleft near the anal verge, no purulent drainage or active bleeding.  Neuro cranial nerves grossly intact, no focal limb deficits  Psych oriented, pleasant affect  Skin no rash, brisk cap refill  Procedures           ED Course  ED Course as of 11/18/24 1500   Mon Nov 18, 2024   1237 Case and exam findings discussed with Dr. Kulkarni who intends to come to the ER to see the patient for further evaluation.  Labs pending. [SH]      ED Course User Index  [SH] Sang Blackburn MD      Results for orders placed or performed during the hospital encounter of 11/18/24   Basic " Metabolic Panel    Collection Time: 11/18/24 12:52 PM    Specimen: Blood   Result Value Ref Range    Glucose 240 (H) 65 - 99 mg/dL    BUN 16 6 - 20 mg/dL    Creatinine 1.05 0.76 - 1.27 mg/dL    Sodium 139 136 - 145 mmol/L    Potassium 4.1 3.5 - 5.2 mmol/L    Chloride 100 98 - 107 mmol/L    CO2 31.2 (H) 22.0 - 29.0 mmol/L    Calcium 9.4 8.6 - 10.5 mg/dL    BUN/Creatinine Ratio 15.2 7.0 - 25.0    Anion Gap 7.8 5.0 - 15.0 mmol/L    eGFR 86.5 >60.0 mL/min/1.73   CBC Auto Differential    Collection Time: 11/18/24 12:52 PM    Specimen: Blood   Result Value Ref Range    WBC 9.46 3.40 - 10.80 10*3/mm3    RBC 4.84 4.14 - 5.80 10*6/mm3    Hemoglobin 14.8 13.0 - 17.7 g/dL    Hematocrit 43.3 37.5 - 51.0 %    MCV 89.5 79.0 - 97.0 fL    MCH 30.6 26.6 - 33.0 pg    MCHC 34.2 31.5 - 35.7 g/dL    RDW 12.5 12.3 - 15.4 %    RDW-SD 41.0 37.0 - 54.0 fl    MPV 8.5 6.0 - 12.0 fL    Platelets 186 140 - 450 10*3/mm3    Neutrophil % 67.9 42.7 - 76.0 %    Lymphocyte % 20.3 19.6 - 45.3 %    Monocyte % 10.1 5.0 - 12.0 %    Eosinophil % 0.7 0.3 - 6.2 %    Basophil % 0.6 0.0 - 1.5 %    Immature Grans % 0.4 0.0 - 0.5 %    Neutrophils, Absolute 6.41 1.70 - 7.00 10*3/mm3    Lymphocytes, Absolute 1.92 0.70 - 3.10 10*3/mm3    Monocytes, Absolute 0.96 (H) 0.10 - 0.90 10*3/mm3    Eosinophils, Absolute 0.07 0.00 - 0.40 10*3/mm3    Basophils, Absolute 0.06 0.00 - 0.20 10*3/mm3    Immature Grans, Absolute 0.04 0.00 - 0.05 10*3/mm3    nRBC 0.0 0.0 - 0.2 /100 WBC   Gold Top - SST    Collection Time: 11/18/24 12:52 PM   Result Value Ref Range    Extra Tube Hold for add-ons.      CT Abdomen Pelvis With Contrast    Result Date: 11/18/2024  1. Postoperative changes related to recent anal sphincteroplasty. No evidence of abscess or large hematoma 2. Hepatic steatosis 3. Bilateral nephrolithiasis 4. Cholelithiasis Electronically Signed: Lanre Razo  11/18/2024 2:08 PM EST  Workstation ID: [id number]                                                      Medical  Decision Making  Patient was seen by colorectal surgery and recommended imaging for further evaluation.  The imaging was negative for abscess.  In conversation with Dr. Kulkarni, he does recommend discharging patient with prescription for antibiotic.  I reviewed his records it appears that he was supposed to be on Augmentin however patient states he never received that from the pharmacy.  He was prescribed a second prescription for Augmentin to start that today.  He will continue his current pain management and add ibuprofen as needed for breakthrough pain.  He is to follow-up with his surgeon in 2 days for recheck.  Patient voiced agreement with the plan and discharged good condition.  He was ordered Dilaudid emergency room for some acute pain management.    Problems Addressed:  Anal pain: complicated acute illness or injury    Amount and/or Complexity of Data Reviewed  Labs: ordered. Decision-making details documented in ED Course.     Details: CBC no leukocytosis, Chem-7 shows some elevated glucose no acidosis  Radiology: ordered.    Risk  Prescription drug management.        Final diagnoses:   Anal pain       ED Disposition  ED Disposition       ED Disposition   Discharge    Condition   Stable    Comment   --               No follow-up provider specified.       Where to Get Your Medications        These medications were sent to [id number] - [place], [place] - [address]  - [phone] [facility] [phone] [address]  [address]  [place] [address]      Phone: [phone]   amoxicillin-clavulanate 875-125 MG per tablet          Medication List      No changes were made to your prescriptions during this visit.            Sang Blackburn MD  11/18/24 9540

## 2024-11-18 NOTE — ED NOTES
Pt c/o increased pain in his anus.  Pt sts he just had surgery a week ago and the area isn't getting any better.

## 2024-11-19 ENCOUNTER — TELEPHONE (OUTPATIENT)
Age: 49
End: 2024-11-19
Payer: COMMERCIAL

## 2024-11-21 ENCOUNTER — OFFICE VISIT (OUTPATIENT)
Age: 49
End: 2024-11-21
Payer: COMMERCIAL

## 2024-11-21 VITALS
RESPIRATION RATE: 24 BRPM | OXYGEN SATURATION: 96 % | SYSTOLIC BLOOD PRESSURE: 149 MMHG | DIASTOLIC BLOOD PRESSURE: 94 MMHG | BODY MASS INDEX: 39.91 KG/M2 | HEIGHT: 72 IN | TEMPERATURE: 98.4 F | WEIGHT: 294.7 LBS | HEART RATE: 78 BPM

## 2024-11-21 DIAGNOSIS — K61.0 PERIANAL ABSCESS: ICD-10-CM

## 2024-11-21 DIAGNOSIS — K60.2 ANAL FISSURE: Primary | ICD-10-CM

## 2024-11-21 RX ORDER — KETOROLAC TROMETHAMINE 10 MG/1
10 TABLET, FILM COATED ORAL EVERY 6 HOURS PRN
Qty: 20 TABLET | Refills: 1 | Status: SHIPPED | OUTPATIENT
Start: 2024-11-21

## 2024-11-21 RX ORDER — METHOCARBAMOL 750 MG/1
750 TABLET, FILM COATED ORAL 4 TIMES DAILY PRN
Qty: 30 TABLET | Refills: 0 | Status: SHIPPED | OUTPATIENT
Start: 2024-11-21 | End: 2024-12-21

## 2024-11-21 RX ORDER — METRONIDAZOLE 500 MG/1
500 TABLET ORAL 3 TIMES DAILY
Qty: 21 TABLET | Refills: 0 | Status: SHIPPED | OUTPATIENT
Start: 2024-11-21 | End: 2024-11-28

## 2024-11-22 NOTE — PROGRESS NOTES
Colorectal Surgery Followup Note    ID:  Asad Odom;   : 1975  DATE OF VISIT: 2024    Chief Complaint  Post-op (Anal fissure sx on )       Subjective    Mr. Elise is status post lateral sphincterotomy.  His procedure was difficult secondary to hemorrhoid which caused bleeding.  His postoperative course was complicated with severe anorectal pain.  He presented to the emergency room and was noted to have normal laboratory work and CT scan.  He currently reports slightly improving and rectal pain.  He reports thick white fluid discharge.  He denies any fevers or chills.  Exam  General:  No acute distress  Head: Normocephalic, atraumatic  Neuro: Alert and oriented    External anorectal exam: Fluctuant induration in the left lateral with surrounding erythema concerning for perianal abscess.  The abscess was drained at bedside after 1 cc of lidocaine was instilled.  An incision was made and a scant purulent fluid was drained      Assessment  -Anal fissure status post lateral sphincterotomy complicated with perianal abscess    Plan / Recommendations  -Unfortunately his postoperative course was complicated with perianal abscess.  There possibly might be underlying fistula from the sphincterotomy site.  I have made an incision and drained the abscess in clinic.  We will see how this evolves with antibiotics.  We have discussed potential need to go back to the operating room if he continues to have pain and discharge.    - follow up in 1 week      Luis Kulkarni MD  Colon and Rectal Surgery   [facility]

## 2024-12-09 ENCOUNTER — TELEPHONE (OUTPATIENT)
Age: 49
End: 2024-12-09
Payer: COMMERCIAL

## 2024-12-09 NOTE — TELEPHONE ENCOUNTER
[facility] staff attempted to follow warm transfer process and was unsuccessful     Caller: Asad Odom    Relationship to patient: Self    Best call back number: [phone]     Patient is needing: PT REQUESTING SOONER APPT, [facility] 1ST AVAIL IN FEB. PT SAYS ISSUE HAS GOTTEN WORSE AND NEEDS TO COME IN BEFORE 12/13/24 @ 10:30

## 2024-12-11 ENCOUNTER — OFFICE VISIT (OUTPATIENT)
Age: 49
End: 2024-12-11
Payer: COMMERCIAL

## 2024-12-11 VITALS
SYSTOLIC BLOOD PRESSURE: 138 MMHG | WEIGHT: 302 LBS | DIASTOLIC BLOOD PRESSURE: 92 MMHG | HEART RATE: 84 BPM | OXYGEN SATURATION: 98 % | HEIGHT: 72 IN | TEMPERATURE: 98.4 F | BODY MASS INDEX: 40.9 KG/M2

## 2024-12-11 DIAGNOSIS — K60.2 ANAL FISSURE: Primary | ICD-10-CM

## 2024-12-11 DIAGNOSIS — K61.0 PERIANAL ABSCESS: ICD-10-CM

## 2024-12-15 NOTE — PROGRESS NOTES
Colorectal Surgery Followup Note    ID:  Asad Odom;   : 1975  DATE OF VISIT: 12/15/2024    Chief Complaint  Follow-up (Follow up on anal fissure )       Subjective     Mr. Elise reports improvement from last visit. He denies pain with BM. His BM have been regular. He reports some times he feels some tingling around anus. He also reports feeling incomplete evacuation of his flatus.     Exam  General:  No acute distress  Head: Normocephalic, atraumatic  Neuro: Alert and oriented     External anorectal exam: well healed abscess drainage site. No tenderness. No drainage. No perianal swelling or erythema. No palpable fistulous tract. He is able to tolerate rectal exam.         Assessment  -Anal fissure status post lateral sphincterotomy complicated with perianal abscess     Plan / Recommendations  -doing better and improved. Over all doing well.   - continue with local antipruritic cream.   - follow up in 6-8 weeks for re-evaluation     Luis Kulkarni MD  Colon and Rectal Surgery   [facility]

## 2024-12-18 ENCOUNTER — TELEPHONE (OUTPATIENT)
Age: 49
End: 2024-12-18
Payer: COMMERCIAL

## 2024-12-18 ENCOUNTER — DOCUMENTATION (OUTPATIENT)
Age: 49
End: 2024-12-18
Payer: COMMERCIAL

## 2024-12-18 NOTE — TELEPHONE ENCOUNTER
Roverto called and said that he felt and knot where   tried to drain fluid a few weeks back and said he squeezed and a bunch of clear fluid came out. He said it then swelled he has done ice and a sitz bath and isnt sure on what to do now because he feels as if he has an infection still.

## 2024-12-18 NOTE — TELEPHONE ENCOUNTER
I may have to do fistula surgery on him. I will send him augmentin for now. If things are not better next week- we will proceed to the OR

## 2024-12-26 ENCOUNTER — TELEPHONE (OUTPATIENT)
Age: 49
End: 2024-12-26
Payer: COMMERCIAL

## 2024-12-26 ENCOUNTER — TELEPHONE (OUTPATIENT)
Dept: FAMILY MEDICINE CLINIC | Facility: CLINIC | Age: 49
End: 2024-12-26
Payer: COMMERCIAL

## 2024-12-26 DIAGNOSIS — K60.2 ANAL FISSURE: ICD-10-CM

## 2024-12-26 DIAGNOSIS — K62.89 RECTAL PAIN: Primary | ICD-10-CM

## 2024-12-26 NOTE — TELEPHONE ENCOUNTER
Patient called stating that today is his last day of antibiotics and feels as if he is needing a stronger script sent in. He said he has a bump on his rectum and it feels as if a bee has stung him and clear fluid comes out when pressure is applied. Patient was made aware that  [place] and a message has been sent to make  aware of patients condition. Patient is tana for an  appt uma/Cayla  1/8. Patient has called multiple times about a new prescription being sent in, even called his primary care office and they advised pt to go to the ed. Patient stated that he is NOT going to ED because they charge him 11 thousand dollars every time he goes. Message has been sent to General surgery on call .

## 2024-12-26 NOTE — TELEPHONE ENCOUNTER
Patient called the office due to possible continuing infection from a colon and rectal surgery. I informed patient his doctor is not in office today and told him to contact the office that handled his surgery. I was told to inform him that if there was not a doctor there that could assist him then he would be advised to go to the ER. Patient stated that he did not like that he has been advised to go to the ER multiple times. I apologized for the situation.

## 2024-12-27 NOTE — TELEPHONE ENCOUNTER
He called dr larson office on 12/18/24 letting them know he was having issues.  stated he may have to do a fistula surgery on him and sent in augmentin on 12/18/24. He is seeing asad on 12/30/2024.   Asad completed antibiotics now and still has yellow fluid coming from the area.  He has been ion contact with 's office as of yesterday but appears neo is out of the country currently.    Would you advise him to do anything different?

## 2024-12-29 DIAGNOSIS — E78.5 DYSLIPIDEMIA: ICD-10-CM

## 2024-12-30 ENCOUNTER — OFFICE VISIT (OUTPATIENT)
Age: 49
End: 2024-12-30
Payer: COMMERCIAL

## 2024-12-30 VITALS
TEMPERATURE: 98 F | WEIGHT: 303 LBS | OXYGEN SATURATION: 97 % | HEART RATE: 76 BPM | DIASTOLIC BLOOD PRESSURE: 95 MMHG | SYSTOLIC BLOOD PRESSURE: 152 MMHG | HEIGHT: 72 IN | BODY MASS INDEX: 41.04 KG/M2

## 2024-12-30 DIAGNOSIS — K60.2 ANAL FISSURE: ICD-10-CM

## 2024-12-30 DIAGNOSIS — K60.322: Primary | ICD-10-CM

## 2024-12-30 PROBLEM — K60.30 ANAL FISSURE AND FISTULA: Status: ACTIVE | Noted: 2024-12-30

## 2024-12-30 PROCEDURE — 99024 POSTOP FOLLOW-UP VISIT: CPT | Performed by: STUDENT IN AN ORGANIZED HEALTH CARE EDUCATION/TRAINING PROGRAM

## 2024-12-30 RX ORDER — SODIUM CHLORIDE 0.9 % (FLUSH) 0.9 %
3-10 SYRINGE (ML) INJECTION AS NEEDED
OUTPATIENT
Start: 2024-12-30

## 2024-12-30 RX ORDER — SODIUM CHLORIDE 0.9 % (FLUSH) 0.9 %
3 SYRINGE (ML) INJECTION EVERY 12 HOURS SCHEDULED
OUTPATIENT
Start: 2024-12-30

## 2024-12-30 RX ORDER — SODIUM CHLORIDE 9 MG/ML
40 INJECTION, SOLUTION INTRAVENOUS AS NEEDED
OUTPATIENT
Start: 2024-12-30

## 2024-12-30 RX ORDER — SIMVASTATIN 40 MG
40 TABLET ORAL NIGHTLY
Qty: 90 TABLET | Refills: 0 | Status: SHIPPED | OUTPATIENT
Start: 2024-12-30

## 2025-01-01 NOTE — PROGRESS NOTES
Colorectal Surgery Followup Note    ID:  Asad Odom;   : 1975  DATE OF VISIT: 2025    Chief Complaint  Follow-up (Follow up anal fissure /)       Subjective    Mr. Odom continue to have discharge from the perianal area. Denies any fever or chills. Reports no pain with BM.   Exam  General:  No acute distress  Head: Normocephalic, atraumatic  Neuro: Alert and oriented    Assessment  - anal fissure s/p sphincterotomy now developed into anal fistula     Plan / Recommendations  -Give persistent discharge, we will plan for OR for examination and possible fistulotomy vs seton   - discussed the risk of the operation and alternatives  - follow up at the time of surgery    Luis Kulkarni MD  Colon and Rectal Surgery   [facility]

## 2025-01-06 ENCOUNTER — ANESTHESIA EVENT (OUTPATIENT)
Dept: PERIOP | Facility: HOSPITAL | Age: 50
End: 2025-01-06
Payer: COMMERCIAL

## 2025-01-06 NOTE — PAT
Notified of time change but pt is almost certain he wont make it in due to road conditions-- Cayla aware

## 2025-01-06 NOTE — ANESTHESIA PREPROCEDURE EVALUATION
Anesthesia Evaluation     Patient summary reviewed and Nursing notes reviewed   history of anesthetic complications:  difficult airway  NPO Solid Status: > 8 hours  NPO Liquid Status: > 8 hours           Airway   Mallampati: II  TM distance: >3 FB  Neck ROM: full  Difficult intubation highly probable  Dental - normal exam   (+) implants        Pulmonary - normal exam    breath sounds clear to auscultation  (+) asthma,shortness of breath, sleep apnea  Cardiovascular - normal exam  Exercise tolerance: unable to assess    ECG reviewed  Rhythm: regular  Rate: normal    (+) hypertension, hyperlipidemia,  carotid artery disease (? Hx)      Neuro/Psych  (+) headaches, psychiatric history  GI/Hepatic/Renal/Endo    (+) morbid obesity, GERD, liver disease history of elevated LFT, renal disease- stones, diabetes mellitus    Musculoskeletal     Abdominal  - normal exam   Substance History - negative use     OB/GYN negative ob/gyn ROS         Other   arthritis, blood dyscrasia anemia,     ROS/Med Hx Other:   Subjective     Mr. Odom continue to have discharge from the perianal area. Denies any fever or chills. Reports no pain with BM.   Exam  General:  No acute distress  Head: Normocephalic, atraumatic  Neuro: Alert and oriented     Assessment  - anal fissure s/p sphincterotomy now developed into anal fistula      Plan / Recommendations  -Give persistent discharge, we will plan for OR for examination and possible fistulotomy vs seton   - discussed the risk of the operation and alternatives  - follow up at the time of surgery     Luis Kulkarni MD  Colon and Rectal Surgery   [facility]                       Anesthesia Plan    ASA 3     general     (Prone, Hx Diff Intubation, Sux, Pimentel, caps upper)  intravenous induction     Anesthetic plan, risks, benefits, and alternatives have been provided, discussed and informed consent has been obtained with: patient.    CODE STATUS:

## 2025-01-07 ENCOUNTER — ANESTHESIA (OUTPATIENT)
Dept: PERIOP | Facility: HOSPITAL | Age: 50
End: 2025-01-07
Payer: COMMERCIAL

## 2025-01-07 ENCOUNTER — HOSPITAL ENCOUNTER (OUTPATIENT)
Facility: HOSPITAL | Age: 50
Discharge: HOME | End: 2025-01-07
Attending: STUDENT IN AN ORGANIZED HEALTH CARE EDUCATION/TRAINING PROGRAM | Admitting: STUDENT IN AN ORGANIZED HEALTH CARE EDUCATION/TRAINING PROGRAM
Payer: COMMERCIAL

## 2025-01-07 VITALS
BODY MASS INDEX: 40.8 KG/M2 | TEMPERATURE: 97.7 F | HEART RATE: 79 BPM | RESPIRATION RATE: 13 BRPM | OXYGEN SATURATION: 99 % | WEIGHT: 300.8 LBS | SYSTOLIC BLOOD PRESSURE: 151 MMHG | DIASTOLIC BLOOD PRESSURE: 99 MMHG

## 2025-01-07 DIAGNOSIS — K60.2 ANAL FISSURE AND FISTULA: Primary | ICD-10-CM

## 2025-01-07 DIAGNOSIS — K60.322: ICD-10-CM

## 2025-01-07 DIAGNOSIS — K60.30 ANAL FISSURE AND FISTULA: Primary | ICD-10-CM

## 2025-01-07 LAB
GLUCOSE BLDC GLUCOMTR-MCNC: 194 MG/DL (ref 70–105)
GLUCOSE BLDC GLUCOMTR-MCNC: 226 MG/DL (ref 70–105)

## 2025-01-07 PROCEDURE — 25010000002 KETOROLAC TROMETHAMINE PER 15 MG

## 2025-01-07 PROCEDURE — 25010000002 SUGAMMADEX 200 MG/2ML SOLUTION

## 2025-01-07 PROCEDURE — 25010000002 SUCCINYLCHOLINE PER 20 MG

## 2025-01-07 PROCEDURE — 25010000002 DEXAMETHASONE PER 1 MG

## 2025-01-07 PROCEDURE — 25010000002 MAGNESIUM SULFATE PER 500 MG OF MAGNESIUM

## 2025-01-07 PROCEDURE — 25010000002 ONDANSETRON PER 1 MG

## 2025-01-07 PROCEDURE — 25810000003 LACTATED RINGERS PER 1000 ML

## 2025-01-07 PROCEDURE — 25810000003 LACTATED RINGERS PER 1000 ML: Performed by: ANESTHESIOLOGY

## 2025-01-07 PROCEDURE — 25010000002 PROPOFOL 1000 MG/100ML EMULSION

## 2025-01-07 PROCEDURE — 82948 REAGENT STRIP/BLOOD GLUCOSE: CPT

## 2025-01-07 PROCEDURE — 25010000002 LIDOCAINE PF 2% 2 % SOLUTION

## 2025-01-07 PROCEDURE — 25010000002 FENTANYL CITRATE (PF) 100 MCG/2ML SOLUTION

## 2025-01-07 PROCEDURE — 25010000002 LORAZEPAM PER 2 MG: Performed by: ANESTHESIOLOGY

## 2025-01-07 PROCEDURE — 25010000002 BUPIVACAINE LIPOSOME 1.3 % SUSPENSION 10 ML VIAL: Performed by: STUDENT IN AN ORGANIZED HEALTH CARE EDUCATION/TRAINING PROGRAM

## 2025-01-07 PROCEDURE — 25010000002 BUPIVACAINE (PF) 0.25 % SOLUTION 30 ML VIAL: Performed by: STUDENT IN AN ORGANIZED HEALTH CARE EDUCATION/TRAINING PROGRAM

## 2025-01-07 DEVICE — HEMOST ABS SURGIFOAM 8X6.25CM 10MM: Type: IMPLANTABLE DEVICE | Site: PERIANAL | Status: FUNCTIONAL

## 2025-01-07 RX ORDER — SODIUM CHLORIDE, SODIUM LACTATE, POTASSIUM CHLORIDE, CALCIUM CHLORIDE 600; 310; 30; 20 MG/100ML; MG/100ML; MG/100ML; MG/100ML
INJECTION, SOLUTION INTRAVENOUS CONTINUOUS PRN
Status: DISCONTINUED | OUTPATIENT
Start: 2025-01-07 | End: 2025-01-07 | Stop reason: SURG

## 2025-01-07 RX ORDER — ACETAMINOPHEN 325 MG/1
650 TABLET ORAL ONCE AS NEEDED
Status: DISCONTINUED | OUTPATIENT
Start: 2025-01-07 | End: 2025-01-07 | Stop reason: HOSPADM

## 2025-01-07 RX ORDER — OXYCODONE HYDROCHLORIDE 5 MG/1
5 TABLET ORAL EVERY 8 HOURS PRN
Qty: 40 TABLET | Refills: 0 | Status: SHIPPED | OUTPATIENT
Start: 2025-01-07 | End: 2025-02-16

## 2025-01-07 RX ORDER — PROPOFOL 10 MG/ML
INJECTION, EMULSION INTRAVENOUS AS NEEDED
Status: DISCONTINUED | OUTPATIENT
Start: 2025-01-07 | End: 2025-01-07 | Stop reason: SURG

## 2025-01-07 RX ORDER — LABETALOL HYDROCHLORIDE 5 MG/ML
5 INJECTION, SOLUTION INTRAVENOUS
Status: DISCONTINUED | OUTPATIENT
Start: 2025-01-07 | End: 2025-01-07 | Stop reason: HOSPADM

## 2025-01-07 RX ORDER — SODIUM CHLORIDE 0.9 % (FLUSH) 0.9 %
3-10 SYRINGE (ML) INJECTION AS NEEDED
Status: DISCONTINUED | OUTPATIENT
Start: 2025-01-07 | End: 2025-01-07 | Stop reason: HOSPADM

## 2025-01-07 RX ORDER — DEXAMETHASONE SODIUM PHOSPHATE 4 MG/ML
INJECTION, SOLUTION INTRA-ARTICULAR; INTRALESIONAL; INTRAMUSCULAR; INTRAVENOUS; SOFT TISSUE AS NEEDED
Status: DISCONTINUED | OUTPATIENT
Start: 2025-01-07 | End: 2025-01-07 | Stop reason: SURG

## 2025-01-07 RX ORDER — HYDRALAZINE HYDROCHLORIDE 20 MG/ML
5 INJECTION INTRAMUSCULAR; INTRAVENOUS
Status: DISCONTINUED | OUTPATIENT
Start: 2025-01-07 | End: 2025-01-07 | Stop reason: HOSPADM

## 2025-01-07 RX ORDER — NALOXONE HCL 0.4 MG/ML
0.4 VIAL (ML) INJECTION AS NEEDED
Status: DISCONTINUED | OUTPATIENT
Start: 2025-01-07 | End: 2025-01-07 | Stop reason: HOSPADM

## 2025-01-07 RX ORDER — ONDANSETRON 2 MG/ML
4 INJECTION INTRAMUSCULAR; INTRAVENOUS ONCE AS NEEDED
Status: DISCONTINUED | OUTPATIENT
Start: 2025-01-07 | End: 2025-01-07 | Stop reason: HOSPADM

## 2025-01-07 RX ORDER — LIDOCAINE HYDROCHLORIDE 20 MG/ML
INJECTION, SOLUTION EPIDURAL; INFILTRATION; INTRACAUDAL; PERINEURAL AS NEEDED
Status: DISCONTINUED | OUTPATIENT
Start: 2025-01-07 | End: 2025-01-07 | Stop reason: SURG

## 2025-01-07 RX ORDER — ROCURONIUM BROMIDE 10 MG/ML
INJECTION, SOLUTION INTRAVENOUS AS NEEDED
Status: DISCONTINUED | OUTPATIENT
Start: 2025-01-07 | End: 2025-01-07 | Stop reason: SURG

## 2025-01-07 RX ORDER — DEXMEDETOMIDINE HYDROCHLORIDE 100 UG/ML
INJECTION, SOLUTION INTRAVENOUS AS NEEDED
Status: DISCONTINUED | OUTPATIENT
Start: 2025-01-07 | End: 2025-01-07 | Stop reason: SURG

## 2025-01-07 RX ORDER — FENTANYL CITRATE 50 UG/ML
INJECTION, SOLUTION INTRAMUSCULAR; INTRAVENOUS AS NEEDED
Status: DISCONTINUED | OUTPATIENT
Start: 2025-01-07 | End: 2025-01-07 | Stop reason: SURG

## 2025-01-07 RX ORDER — SUCCINYLCHOLINE CHLORIDE 20 MG/ML
INJECTION INTRAMUSCULAR; INTRAVENOUS AS NEEDED
Status: DISCONTINUED | OUTPATIENT
Start: 2025-01-07 | End: 2025-01-07 | Stop reason: SURG

## 2025-01-07 RX ORDER — OXYCODONE HYDROCHLORIDE 5 MG/1
5 TABLET ORAL ONCE AS NEEDED
Status: DISCONTINUED | OUTPATIENT
Start: 2025-01-07 | End: 2025-01-07 | Stop reason: HOSPADM

## 2025-01-07 RX ORDER — SODIUM CHLORIDE 0.9 % (FLUSH) 0.9 %
10 SYRINGE (ML) INJECTION AS NEEDED
Status: DISCONTINUED | OUTPATIENT
Start: 2025-01-07 | End: 2025-01-07 | Stop reason: HOSPADM

## 2025-01-07 RX ORDER — ALBUTEROL SULFATE 0.83 MG/ML
2.5 SOLUTION RESPIRATORY (INHALATION) ONCE AS NEEDED
Status: DISCONTINUED | OUTPATIENT
Start: 2025-01-07 | End: 2025-01-07 | Stop reason: HOSPADM

## 2025-01-07 RX ORDER — LORAZEPAM 2 MG/ML
2 INJECTION INTRAMUSCULAR EVERY 4 HOURS PRN
Status: DISCONTINUED | OUTPATIENT
Start: 2025-01-07 | End: 2025-01-07 | Stop reason: HOSPADM

## 2025-01-07 RX ORDER — FENTANYL CITRATE 50 UG/ML
25 INJECTION, SOLUTION INTRAMUSCULAR; INTRAVENOUS
Status: DISCONTINUED | OUTPATIENT
Start: 2025-01-07 | End: 2025-01-07 | Stop reason: HOSPADM

## 2025-01-07 RX ORDER — DIPHENHYDRAMINE HYDROCHLORIDE 50 MG/ML
12.5 INJECTION INTRAMUSCULAR; INTRAVENOUS
Status: DISCONTINUED | OUTPATIENT
Start: 2025-01-07 | End: 2025-01-07 | Stop reason: HOSPADM

## 2025-01-07 RX ORDER — KETOROLAC TROMETHAMINE 30 MG/ML
INJECTION, SOLUTION INTRAMUSCULAR; INTRAVENOUS AS NEEDED
Status: DISCONTINUED | OUTPATIENT
Start: 2025-01-07 | End: 2025-01-07 | Stop reason: SURG

## 2025-01-07 RX ORDER — ONDANSETRON 2 MG/ML
INJECTION INTRAMUSCULAR; INTRAVENOUS AS NEEDED
Status: DISCONTINUED | OUTPATIENT
Start: 2025-01-07 | End: 2025-01-07 | Stop reason: SURG

## 2025-01-07 RX ORDER — SODIUM CHLORIDE 0.9 % (FLUSH) 0.9 %
3 SYRINGE (ML) INJECTION EVERY 12 HOURS SCHEDULED
Status: DISCONTINUED | OUTPATIENT
Start: 2025-01-07 | End: 2025-01-07 | Stop reason: HOSPADM

## 2025-01-07 RX ORDER — FENTANYL CITRATE 50 UG/ML
50 INJECTION, SOLUTION INTRAMUSCULAR; INTRAVENOUS
Status: DISCONTINUED | OUTPATIENT
Start: 2025-01-07 | End: 2025-01-07 | Stop reason: HOSPADM

## 2025-01-07 RX ORDER — LIDOCAINE HYDROCHLORIDE 10 MG/ML
0.5 INJECTION, SOLUTION EPIDURAL; INFILTRATION; INTRACAUDAL; PERINEURAL ONCE AS NEEDED
Status: DISCONTINUED | OUTPATIENT
Start: 2025-01-07 | End: 2025-01-07 | Stop reason: HOSPADM

## 2025-01-07 RX ORDER — DIBUCAINE 1% 1 G/100G
CREAM TOPICAL AS NEEDED
Status: DISCONTINUED | OUTPATIENT
Start: 2025-01-07 | End: 2025-01-07 | Stop reason: HOSPADM

## 2025-01-07 RX ORDER — MAGNESIUM SULFATE HEPTAHYDRATE 500 MG/ML
INJECTION, SOLUTION INTRAMUSCULAR; INTRAVENOUS AS NEEDED
Status: DISCONTINUED | OUTPATIENT
Start: 2025-01-07 | End: 2025-01-07 | Stop reason: SURG

## 2025-01-07 RX ORDER — SODIUM CHLORIDE, SODIUM LACTATE, POTASSIUM CHLORIDE, CALCIUM CHLORIDE 600; 310; 30; 20 MG/100ML; MG/100ML; MG/100ML; MG/100ML
20 INJECTION, SOLUTION INTRAVENOUS ONCE
Status: COMPLETED | OUTPATIENT
Start: 2025-01-07 | End: 2025-01-07

## 2025-01-07 RX ADMIN — DEXMEDETOMIDINE HYDROCHLORIDE 4 MCG: 100 INJECTION, SOLUTION INTRAVENOUS at 11:10

## 2025-01-07 RX ADMIN — SUGAMMADEX 273 MG: 100 INJECTION, SOLUTION INTRAVENOUS at 11:30

## 2025-01-07 RX ADMIN — SODIUM CHLORIDE, SODIUM LACTATE, POTASSIUM CHLORIDE, AND CALCIUM CHLORIDE: .6; .31; .03; .02 INJECTION, SOLUTION INTRAVENOUS at 10:41

## 2025-01-07 RX ADMIN — DEXAMETHASONE SODIUM PHOSPHATE 4 MG: 4 INJECTION, SOLUTION INTRAMUSCULAR; INTRAVENOUS at 10:54

## 2025-01-07 RX ADMIN — FENTANYL CITRATE 50 MCG: 50 INJECTION, SOLUTION INTRAMUSCULAR; INTRAVENOUS at 10:46

## 2025-01-07 RX ADMIN — SODIUM CHLORIDE, SODIUM LACTATE, POTASSIUM CHLORIDE, AND CALCIUM CHLORIDE 20 ML/HR: .6; .31; .03; .02 INJECTION, SOLUTION INTRAVENOUS at 09:17

## 2025-01-07 RX ADMIN — LORAZEPAM 2 MG: 2 INJECTION INTRAMUSCULAR; INTRAVENOUS at 09:41

## 2025-01-07 RX ADMIN — KETOROLAC TROMETHAMINE 30 MG: 30 INJECTION, SOLUTION INTRAMUSCULAR at 11:04

## 2025-01-07 RX ADMIN — PROPOFOL INJECTABLE EMULSION 200 MG: 10 INJECTION, EMULSION INTRAVENOUS at 10:46

## 2025-01-07 RX ADMIN — ROCURONIUM BROMIDE 40 MG: 10 INJECTION, SOLUTION INTRAVENOUS at 10:53

## 2025-01-07 RX ADMIN — ROCURONIUM BROMIDE 10 MG: 10 INJECTION, SOLUTION INTRAVENOUS at 10:46

## 2025-01-07 RX ADMIN — MAGNESIUM SULFATE HEPTAHYDRATE 2 G: 500 INJECTION, SOLUTION INTRAMUSCULAR; INTRAVENOUS at 10:53

## 2025-01-07 RX ADMIN — LIDOCAINE HYDROCHLORIDE 100 MG: 20 SOLUTION INTRAVENOUS at 10:46

## 2025-01-07 RX ADMIN — FENTANYL CITRATE 50 MCG: 50 INJECTION, SOLUTION INTRAMUSCULAR; INTRAVENOUS at 11:07

## 2025-01-07 RX ADMIN — DEXMEDETOMIDINE HYDROCHLORIDE 4 MCG: 100 INJECTION, SOLUTION INTRAVENOUS at 11:14

## 2025-01-07 RX ADMIN — SUCCINYLCHOLINE CHLORIDE 160 MG: 20 INJECTION, SOLUTION INTRAMUSCULAR; INTRAVENOUS at 10:46

## 2025-01-07 RX ADMIN — DEXMEDETOMIDINE HYDROCHLORIDE 4 MCG: 100 INJECTION, SOLUTION INTRAVENOUS at 11:17

## 2025-01-07 RX ADMIN — ONDANSETRON 4 MG: 2 INJECTION INTRAMUSCULAR; INTRAVENOUS at 11:17

## 2025-01-07 RX ADMIN — Medication 3 ML: at 09:41

## 2025-01-07 NOTE — DISCHARGE INSTRUCTIONS
[facility]  Postoperative Instructions after Anorectal Surgery    After your surgery, the following may occur and SHOULD NOT alarm you:   a) Drainage of bloody discharge (a tablespoonful or less)   b) Some swelling around the anus   c) Soreness, burning, itching, or dull aching   d) Pain with bowel movement   e) Occasional passage of bright red blood   f) Mild fatigue   g) Mild fever (less than 101.4)   h) odor.    Diet:   1. Eat a regular high fiber diet with a considerable amount of cooked vegetables, fruits and fruit juices.  2. Avoid spicy foods.  3. Drink greater than 64 ounces of water or liquids without caffeine daily.    Wound Care:  1. Use ice packs for the first two days following surgery if you feel that it helps your discomfort.  2. Take warm soaks/sitzbaths/showers 1-2 times a day, water as warm as can be tolerated.  3. Expect bleeding/drainage with bowel movements, so wear pads to protect underwear.    Pain Meds:  1. Motrin 600mg every 6 hours as needed  2. Narcotic pain medication as prescribed  3. If nauseated or vomiting, take phenergan or zofran as prescribed (call my office for a prescription).  Try to stay hydrated.  If nausea persists, you need to be seen in the office.   4. Do not drive while taking narcotics    Constipated:   1. To avoid constipation:   A. Take Fiber (example: metamucil) 4 grams daily   B. Miralax 17 grams nightly as needed   C. Milk of Magnesia 2 tablespoons am/pm as needed  2. If bowel movements become too loose, stop MOM but stay on fiber supplements.    No Bowel Movements:  1. If unable to have a bowel movement in 36-48 hours:   A. Take 3 Dulcolax laxative tablets   B. If no bowel movement within 4-6 hours after Dulcolax, take 5 ounces of Magnesium Citrate    If Unable to Urinate:  1. Try to urinate in a hot shower or bath.   2. If still unable to urinate, go to the Emergency Room to have a catheter placed.  The doctor in the office will remove  this catheter in a few days.     When to Call Us:  1. Fever higher than 101.5  2. Persistent nausea and/or vomiting  3. Excessive bleeding (bloody diarrhea).  It is normal to pass some blood with bowel movements but passing a cup of blood at a time is abnormal.  4. Increasing pain not relieved with above instructions.     Follow up:   Generally in 2-3 weeks unless otherwise directed.  Please call to make an appointment.         Luis Kulkarni MD  Colon and Rectal Surgery   [facility]

## 2025-01-07 NOTE — OP NOTE
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 intersphincteric fistula in the left lateral position.  Complications   No complications  Indication  49 y.o. male with a history of anal fissure s/p sphincterotomy. He developed a a fistula-in-ano from sphincterotomy site.     Description of Procedure  Informed consent was obtained and the patient was taken to the OR.  Anesthesia was administered. Ricky prominences were padded. The patient was place in the prone position. The anus was prepped and draped in sterile fashion.  A local anal block was performed.  On inspection there was a secondary (external) opening of the fistula at the left lateral position.  A digital rectal examination didnt reveal a palpable cord.     A Calvo retractor was placed and a primary (internal) opening was seen in the left lateral position.     The fistulous tract was probed and was found to be only involving a minimal amount of external and internal sphincter.   As such, the tract was opened with electrocautery over the probe. The tract was curetted out. Hemostasis was obtained.    Dressings were applied and the case was concluded.  Counts: Instrument, sponge, and needle counts were reported by the scrub nurse to be correct at the conclusion of the case.  Disposition  The patient was taken to Recovery Room in good condition.

## 2025-01-07 NOTE — ANESTHESIA PROCEDURE NOTES
Airway  Urgency: elective    Date/Time: 1/7/2025 10:48 AM    General Information and Staff    Patient location during procedure: OR  CRNA/CAA: Trisha Ngo CRNA    Indications and Patient Condition  Indications for airway management: airway protection    Preoxygenated: yes  Mask difficulty assessment: 0 - not attempted    Final Airway Details  Final airway type: endotracheal airway      Successful airway: ETT  Cuffed: yes   Successful intubation technique: video laryngoscopy  Facilitating devices/methods: intubating stylet  Endotracheal tube insertion site: oral  Blade: Pimentel  Blade size: 4  ETT size (mm): 7.5  Cormack-Lehane Classification: grade I - full view of glottis  Placement verified by: chest auscultation and capnometry   Measured from: lips  ETT/EBT  to lips (cm): 23  Number of attempts at approach: 1  Assessment: lips, teeth, and gum same as pre-op and atraumatic intubation

## 2025-01-07 NOTE — ANESTHESIA POSTPROCEDURE EVALUATION
[facility]    Hospitalist Progress Note    Assessment & Plan   Jerome Flanagan is a 46 year old male with PMhx of severe alcohol use disorder, hypertension and anxiety who was admitted on 1/13/2024 for evaluation after a suspected seizure with suspected EtOH withdrawal.     Hospital stay has been prolonged due to need for chem dep treatment. Additionally, he has been found to have COVID19 infection with ongoing fever      Alcohol dependence   Seizure, new onset likely from alcohol withdrawal   History of fall and unsteady gaits  T12-L2 compression fracture  *Patient is a chronic alcoholic, he denies any prior history of seizures but as per report from EMS and his mother patient was found on the ground and was having tonic-clonic movements.   *In ED, he was noted to have a left posterior scalp soft tissue contusion/hematoma. Also noted to have multiple bruises likely old on his back with suggest either previous falls or previous seizure. Head CT neg for intracranial injury. CT abd/pelvis with mild age-indeterminate compression deformity at T12-L2 vertebral bodies probably chronic Was admitted with suspected acute alcohol withdrawal.   *Seen by general neurology this stay, EEG with no obvious epileptiform discharges or electrographic seizures. MRI brain showed changes of possible recent seizure activity vs underlying mesial temporal sclerosis. Neurology felt the changes seen in the temporal lobe due to prior seizures and no other concerns. Did not recommend treatment.  *No seizures noted during hospital stay  -- conts on thiamine/folate  -- PT/OT had been recommending TCU stay, encourage ambulation while hospitalized -- therapy is now hopeful patient will be strong enough to go straight to IP chem dep program    Alcohol use disorder, severe, dependence   Alcoholic hepatitis without ascites   *LFTs on admission with AST//41, total bili 1.9. CT abd/pelvis was neg for acute pathology  Patient: Asad Odom    Procedure Summary       Date: 01/07/25 Room / Location: [facility] OR 06 / [facility] MAIN OR    Anesthesia Start: 1041 Anesthesia Stop: 1142    Procedure: FISTULOTOMY (Rectum) Diagnosis:       Anal fistula, complex, persistent      (Anal fistula, complex, persistent [K60.322])    Surgeons: Luis Kulkarni MD Provider: Santiago Rivers MD    Anesthesia Type: general ASA Status: 3            Anesthesia Type: general    Vitals  Vitals Value Taken Time   /97 01/07/25 1229   Temp 97.6 °F (36.4 °C) 01/07/25 1141   Pulse 77 01/07/25 1232   Resp 13 01/07/25 1226   SpO2 100 % 01/07/25 1232   Vitals shown include unfiled device data.        Post Anesthesia Care and Evaluation    Patient location during evaluation: PACU  Patient participation: complete - patient participated  Level of consciousness: awake  Pain scale: See nurse's notes for pain score.  Pain management: adequate    Airway patency: patent  Anesthetic complications: No anesthetic complications  PONV Status: none  Cardiovascular status: acceptable  Respiratory status: acceptable and spontaneous ventilation  Hydration status: acceptable    Comments: Patient seen and examined postoperatively; vital signs stable; SpO2 greater than or equal to 90%; cardiopulmonary status stable; nausea/vomiting adequately controlled; pain adequately controlled; no apparent anesthesia complications; patient discharged from anesthesia care when discharge criteria were met     including pancreatitis.   *Labs improved during stay.  *No issues with overt EtOH withdrawals this stay  *Psych following, initiated a petition for commitment for  CD treatment   -- cont gabapentin (600mg TID as per prior to admission), MVI and thiamine    Anion gap metabolic acidosis with respiratory compensation: Resolved  Likely starvation ketoacidosis: Resolved  *Initial labs notable for bicarb 10, AG 37. Lactate nl. VBG the morning following showed pH 7.3, pCO2 26 and bicarb 12, indicative of respiratory compensation. Serum ketones notably elevated.   *Labs normalized with supportive cares and oral intake.    COVID-19 infection: Recovered  *Tested positive for COVID19 on 1/4/24. Was initially asymptomatic but then developed a low grade fever on 1/17. No respiratory symptoms. CXR neg for infiltrate.   *Fever was initially suspected to be due to COVID, though fevers persisted >1wk after COVID diagnosis so unclear if other etiology contributing. No other s/sx of infection elsewhere -- UA neg, blood cultures neg. Procal 0.83 with stable trend.   *See discussion below regarding possible left third PIP joint acute gouty flare/infectious arthritis.  *Chemical DVT ppx was initially held dt anemia/thrombocytopenia, continued to defer given ongoing high risk for bleeding dt esophagitis and   *ID consulted, no new recommendations. Advised to treat gout and alcohol use. No specific treatment indicated for COVID.  *Considered COVID recovered on 1/25/24 and taken out of isolation.      Gout, with possible flare  Rule out infectious arthritis involving left third PIP joint  *Has hx of tophaceous gout. Allopurinol held on admission.   *As hospital stay progressed, patient reported worsening pain and inflammation involving both wrist and small hand joints with  inflammation of the third digit middle IPJ  *Started on colchicine on 1/20.  *Xray of hands showed marked soft tissue thickening/swelling about the PIP joint of the  middle finger.    *Due to ongoing fever, orthopedic surgery consulted to evaluate for infectious arthritis. Patient was seen by Dr. Tabares for orthopedic surgery on 1/21, acknowledged he has gout but did not have findings suggestive of an acute flare. Advised to continue allopurinol. No indication for surgery.   *Condition improved with allopurinol and colchicine  -- cont colchicine daily, allopurinol  -- no need to trend CRPs at this point    RUE superficial thrombophlebitis  *Noted to have developed erythema, edema and discomfort in right upper extremity on 1/23/24 near site of IV.  Ultrasound obtained, negative for DVT but did show a superficial venous thrombus in the right cephalic vein.  Right upper extremity peripheral IV was removed on 1/23 as patient was no longer needing IV medications  -- supportive mgmt with heat compress, prns     Acute anemia on chronic anemia  Severe thrombocytopenia  Severe esophagitis  *No reported hx of bleeding, baseline hgb seems to be 9-12 over the preceding 2 yrs.   *Hgb 9.9 on admission this stay. Decreased to 7 after initial IV hydration.   *Platelets remain low this stay.   *Initial iron studies showed iron level of 132  however repeat iron level is? 12 iron sat 10 and iron binding capacity with 119 suggesting mixed picture, LDH normal, Hemoccult positive.  *Transfused 1U PRBC on 11/5 for hgb  7.1.  *Seen by Stacy LEIJA. Underwent EGD on 1/16 which showed severe esophagitis but no stigmata of bleeding. Cont PPI BID.   *Seen by heme/onc this stay -- given IV Venofer x2 and continued on oral replacement.   -- hgb now stable at 8    Hypomagnesemia  Hypophosphatemia  Hypokalemia  Hyponatremia: Improved  *Likely from ongoing alcohol use. Replace per protocol.      Essential hypertension  *Chronic and stable on amlodipine  *Lisinopril held on admission, resumed on 1/24/24    Generalized anxiety disorder  *Chronic and stable on Celexa     FEN: no IVFs, lytes stable, regular  diet  DVT Prophylaxis: PCDs  Code Status: Full Code    Clinically Significant Risk Factors            # Hypomagnesemia: Lowest Mg = 1.4 mg/dL in last 2 days, will replace as needed   # Hypoalbuminemia: Lowest albumin = 2.8 g/dL at 1/19/2024  7:07 AM, will monitor as appropriate     # Hypertension: Noted on problem list              # Financial/Environmental Concerns: unemployed         Disposition: Discharge pending commitment process, patient is now on a court hold. SW following    Cristela Larry, DO    Medical Decision Making       Please see A&P for additional details of medical decision making.       Interval History   Chart reviewed. Uneventful night. Seen this morning. Flat affect. Reports some ongoing pain in R forearm. Otherwise feeling okay. Taking po.     -Data reviewed today: I reviewed all new labs and imaging results over the last 24 hours. I personally reviewed no images or EKG's today.    Physical Exam   Temp: 99.4  F (37.4  C) Temp src: Oral BP: 117/71 Pulse: 91   Resp: 18 SpO2: 96 % O2 Device: None (Room air)    Vitals:    01/13/24 1112 01/20/24 1300   Weight: 67.6 kg (149 lb) 72.9 kg (160 lb 12.8 oz)     Vital Signs with Ranges  Temp:  [98.8  F (37.1  C)-99.4  F (37.4  C)] 99.4  F (37.4  C)  Pulse:  [91-95] 91  Resp:  [18] 18  BP: ()/(61-86) 117/71  SpO2:  [96 %-97 %] 96 %  I/O last 3 completed shifts:  In: 720 [P.O.:720]  Out: -     Constitutional: Resting comfortably, alert and conversing appropriately, NAD  Respiratory: CTAB, no wheeze, no increased work of breathing  Cardiovascular: HRRR with soft SM, no LE edema  GI: S, NT, ND, +BS  Skin/Integumen: minimal RUE erythema in distal forearm, no proximal extension  Other: +tophi in L 3rd PIP w/o significant erythema    Medications      allopurinol  100 mg Oral Daily    amLODIPine  10 mg Oral Daily    colchicine  0.6 mg Oral Daily    ferrous sulfate  325 mg Oral Daily    folic acid  1 mg Oral Daily    gabapentin  600 mg Oral TID     lisinopril  20 mg Oral Daily    magnesium oxide  400 mg Oral Daily    multivitamin w/minerals  1 tablet Oral Daily    pantoprazole  40 mg Oral BID AC    senna-docusate  1 tablet Oral BID    Or    senna-docusate  2 tablet Oral BID    thiamine  100 mg Oral Daily       Data   Recent Labs   Lab 01/24/24  1047 01/23/24  0909 01/22/24  0847 01/21/24  0820 01/20/24  1133   WBC  --   --   --   --  9.7   HGB  --   --   --   --  8.7*   MCV  --   --   --   --  98   PLT  --   --   --   --  448   POTASSIUM 3.8 3.7 3.5 3.8 4.0   CR  --   --   --  0.89  --        No results found for this or any previous visit (from the past 24 hour(s)).

## 2025-01-14 ENCOUNTER — TELEPHONE (OUTPATIENT)
Age: 50
End: 2025-01-14
Payer: COMMERCIAL

## 2025-01-21 ENCOUNTER — HOSPITAL ENCOUNTER (OUTPATIENT)
Dept: GENERAL RADIOLOGY | Facility: HOSPITAL | Age: 50
Discharge: HOME | End: 2025-01-21
Admitting: FAMILY MEDICINE
Payer: COMMERCIAL

## 2025-01-21 ENCOUNTER — OFFICE VISIT (OUTPATIENT)
Dept: FAMILY MEDICINE CLINIC | Facility: CLINIC | Age: 50
End: 2025-01-21
Payer: COMMERCIAL

## 2025-01-21 VITALS
BODY MASS INDEX: 41.96 KG/M2 | OXYGEN SATURATION: 93 % | HEIGHT: 72 IN | WEIGHT: 309.8 LBS | HEART RATE: 82 BPM | TEMPERATURE: 99.6 F | RESPIRATION RATE: 18 BRPM | DIASTOLIC BLOOD PRESSURE: 86 MMHG | SYSTOLIC BLOOD PRESSURE: 134 MMHG

## 2025-01-21 DIAGNOSIS — E66.813 CLASS 3 SEVERE OBESITY DUE TO EXCESS CALORIES WITH SERIOUS COMORBIDITY AND BODY MASS INDEX (BMI) OF 40.0 TO 44.9 IN ADULT: ICD-10-CM

## 2025-01-21 DIAGNOSIS — E66.01 CLASS 3 SEVERE OBESITY DUE TO EXCESS CALORIES WITH SERIOUS COMORBIDITY AND BODY MASS INDEX (BMI) OF 40.0 TO 44.9 IN ADULT: ICD-10-CM

## 2025-01-21 DIAGNOSIS — M54.50 BILATERAL LOW BACK PAIN, UNSPECIFIED CHRONICITY, UNSPECIFIED WHETHER SCIATICA PRESENT: Primary | ICD-10-CM

## 2025-01-21 DIAGNOSIS — F17.200 TOBACCO USE DISORDER: ICD-10-CM

## 2025-01-21 DIAGNOSIS — M51.9 LUMBAR DISC DISEASE: ICD-10-CM

## 2025-01-21 LAB
BILIRUB BLD-MCNC: NEGATIVE MG/DL
CLARITY, POC: CLEAR
COLOR UR: YELLOW
GLUCOSE UR STRIP-MCNC: ABNORMAL MG/DL
KETONES UR QL: NEGATIVE
LEUKOCYTE EST, POC: NEGATIVE
NITRITE UR-MCNC: NEGATIVE MG/ML
PH UR: 7 [PH] (ref 5–8)
PROT UR STRIP-MCNC: ABNORMAL MG/DL
RBC # UR STRIP: NEGATIVE /UL
SP GR UR: 1.02 (ref 1–1.03)
UROBILINOGEN UR QL: ABNORMAL

## 2025-01-21 PROCEDURE — 99213 OFFICE O/P EST LOW 20 MIN: CPT | Performed by: FAMILY MEDICINE

## 2025-01-21 PROCEDURE — 81002 URINALYSIS NONAUTO W/O SCOPE: CPT | Performed by: FAMILY MEDICINE

## 2025-01-21 PROCEDURE — 72110 X-RAY EXAM L-2 SPINE 4/>VWS: CPT

## 2025-01-21 RX ORDER — GABAPENTIN 300 MG/1
300 CAPSULE ORAL 3 TIMES DAILY PRN
Qty: 90 CAPSULE | Refills: 2 | Status: SHIPPED | OUTPATIENT
Start: 2025-01-21

## 2025-01-21 NOTE — PROGRESS NOTES
Subjective   Asad Odom is a 49 y.o. male.     Chief Complaint   Patient presents with   • Back Pain       Back Pain  This is a new problem. The current episode started in the past 7 days. The problem occurs constantly. The problem is unchanged. The pain is present in the gluteal and lumbar spine. The quality of the pain is described as shooting, stabbing, cramping and burning. Radiates to: Radiates across lower back. The pain is at a severity of 10/10. The pain is severe. The pain is The same all the time. The symptoms are aggravated by standing, twisting, lying down, sitting, position, bending and coughing. Stiffness is present All day. Pertinent negatives include no abdominal pain, bladder incontinence, bowel incontinence, chest pain, headaches, numbness, tingling or weakness. Risk factors include obesity. He has tried heat and NSAIDs (Tens unit) for the symptoms. The treatment provided mild relief.            I personally reviewed and updated the patient's allergies, medications, problem list, and past medical, surgical, social, and family history. I have reviewed and confirmed the accuracy of the History of Present Illness and Review of Symptoms as documented by the MA/LPN/RN. Pablo Noyola MD    Family History   Problem Relation Age of Onset   • Other Mother         suicide   • Other Sister         Suicide sibilingd       Social History     Tobacco Use   • Smoking status: Former     Current packs/day: 0.00     Average packs/day: 0.3 packs/day for 28.5 years (7.1 ttl pk-yrs)     Types: Cigarettes, Cigars     Start date: 1991     Quit date: 2019     Years since quittin.5     Passive exposure: Never   • Smokeless tobacco: Current     Types: Chew   • Tobacco comments:     Quit 18, but now chews off and on    Vaping Use   • Vaping status: Never Used   Substance Use Topics   • Alcohol use: Yes     Comment: occasional   • Drug use: No       Past Surgical History:   Procedure Laterality Date   •  BONE BIOPSY     • BONE MARROW BIOPSY     • CYSTOSCOPY BLADDER STONE LITHOTRIPSY     • KNEE MENISCAL REPAIR Left    • MANDIBLE FRACTURE SURGERY     • RECTAL EXAMINATION UNDER ANESTHESIA N/A 1/7/2025    Procedure: FISTULOTOMY;  Surgeon: Luis Kulkarni MD;  Location: [facility] MAIN OR;  Service: General;  Laterality: N/A;   • RECTAL SPHINCTEROPLASTY N/A 11/12/2024    Procedure: SPHINCTEROTOMY;  Surgeon: Luis Kulkarni MD;  Location: [facility] MAIN OR;  Service: General;  Laterality: N/A;       Patient Active Problem List   Diagnosis   • Asthma   • Dyslipidemia   • Elevated serum tryptase   • Essential hypertension   • Mast cell disorder   • Nephrolithiasis   • Anxiety   • GERD (gastroesophageal reflux disease)   • Osteoarthritis   • Carotid artery stenosis   • AK (actinic keratosis)   • Annual visit for general adult medical examination with abnormal findings   • Tobacco use disorder   • Class 3 severe obesity due to excess calories with serious comorbidity and body mass index (BMI) of 40.0 to 44.9 in adult   • Arthritis   • Elevated liver function tests   • Acute right-sided low back pain without sciatica   • Encounter for CDL (commercial driving license) exam   • Hemorrhoids   • Hypercholesterolemia   • Elevated fasting blood sugar   • Type 2 diabetes mellitus with hyperglycemia, without long-term current use of insulin   • Generalized abdominal pain   • Hordeolum internum of left upper eyelid   • Myopia of left eye   • Rectal pain   • Anal fissure   • Anal fistula, complex, persistent   • Anal fissure and fistula   • Lumbar disc disease         Current Outpatient Medications:   •  aspirin 81 MG tablet, Take 1 tablet by mouth., Disp: , Rfl:   •  carvedilol (COREG) 25 MG tablet, TAKE 1 TABLET BY MOUTH TWICE DAILY WITH MEALS, Disp: 180 tablet, Rfl: 0  •  cetirizine (zyrTEC) 5 MG tablet, Take 1 tablet by mouth., Disp: , Rfl:   •  CINNAMON PO, Take  by mouth., Disp: , Rfl:   •  clotrimazole-betamethasone (LOTRISONE) 1-0.05 %  cream, Apply 1 Application topically to the appropriate area as directed 2 (Two) Times a Day As Needed (rash)., Disp: 60 g, Rfl: 2  •  cyclobenzaprine (FLEXERIL) 10 MG tablet, Take 0.5-1 tablets by mouth Daily As Needed for Muscle Spasms., Disp: 30 tablet, Rfl: 2  •  diphenhydrAMINE (BENADRYL) 25 mg capsule, Take 1 capsule by mouth Every 6 (Six) Hours As Needed for Itching., Disp: , Rfl:   •  docusate sodium (COLACE) 50 MG capsule, Take 1 capsule by mouth 2 (Two) Times a Day., Disp: , Rfl:   •  famotidine (PEPCID) 20 MG tablet, Take 1 tablet by mouth 2 (Two) Times a Day., Disp: , Rfl:   •  GARLIC PO, Take  by mouth., Disp: , Rfl:   •  glipizide (glipiZIDE XL) 5 MG ER tablet, Take 1 tablet by mouth Daily., Disp: 30 tablet, Rfl: 5  •  Hydrocort-Pramoxine, Perianal, (Analpram HC) 2.5-1 % rectal cream, Insert  into the rectum 3 (Three) Times a Day., Disp: 30 g, Rfl: 0  •  hydrocortisone 1 % cream, Apply 1 Application topically to the appropriate area as directed 2 (Two) Times a Day., Disp: 60 g, Rfl: 1  •  ketorolac (TORADOL) 10 MG tablet, Take 1 tablet by mouth Every 6 (Six) Hours As Needed for Moderate Pain., Disp: 20 tablet, Rfl: 1  •  lidocaine (XYLOCAINE) 2 % jelly, 0.5% NIFEDIPINE WITH 2% LIDOCAINE , APPLY TO RECTUM , 3-4 TIMES DAILY, Disp: 30 g, Rfl: 0  •  lisinopril (PRINIVIL,ZESTRIL) 40 MG tablet, Take 1 tablet by mouth once daily, Disp: 90 tablet, Rfl: 1  •  Misc Natural Products (BEET ROOT PO), Take  by mouth., Disp: , Rfl:   •  multivitamin with minerals tablet tablet, Take 1 tablet by mouth Daily., Disp: , Rfl:   •  oxyCODONE (Roxicodone) 5 MG immediate release tablet, Take 1 tablet by mouth Every 8 (Eight) Hours As Needed for Moderate Pain for up to 40 days., Disp: 40 tablet, Rfl: 0  •  simvastatin (ZOCOR) 40 MG tablet, TAKE 1 TABLET BY MOUTH ONCE DAILY AT NIGHT, Disp: 90 tablet, Rfl: 0  •  amoxicillin-clavulanate (AUGMENTIN) 875-125 MG per tablet, Take 1 tablet by mouth 2 (Two) Times a Day. (Patient not  "taking: Reported on 1/21/2025), Disp: 14 tablet, Rfl: 0  •  amoxicillin-clavulanate (AUGMENTIN) 875-125 MG per tablet, Take 1 tablet by mouth 2 (Two) Times a Day. (Patient not taking: Reported on 1/21/2025), Disp: 14 tablet, Rfl: 0  •  amoxicillin-clavulanate (AUGMENTIN) 875-125 MG per tablet, Take 1 tablet by mouth 2 (Two) Times a Day. (Patient not taking: Reported on 1/21/2025), Disp: 20 tablet, Rfl: 0  •  fenofibrate (TRICOR) 145 MG tablet, Take 1 tablet by mouth once daily, Disp: 90 tablet, Rfl: 0  •  gabapentin (NEURONTIN) 300 MG capsule, Take 1 capsule by mouth 3 (Three) Times a Day As Needed (back pain)., Disp: 90 capsule, Rfl: 2  •  hydroCHLOROthiazide 25 MG tablet, Take 1 tablet by mouth once daily, Disp: 90 tablet, Rfl: 0         Review of Systems   Constitutional:  Negative for chills and diaphoresis.   HENT:  Negative for trouble swallowing and voice change.    Eyes:  Negative for visual disturbance.   Respiratory:  Negative for shortness of breath.    Cardiovascular:  Negative for chest pain and palpitations.   Gastrointestinal:  Negative for abdominal pain, bowel incontinence and nausea.   Endocrine: Negative for polydipsia and polyphagia.   Genitourinary:  Negative for hematuria and urinary incontinence.   Musculoskeletal:  Positive for back pain. Negative for neck stiffness.   Skin:  Negative for color change and pallor.   Allergic/Immunologic: Negative for immunocompromised state.   Neurological:  Negative for tingling, seizures, syncope, weakness and numbness.   Hematological:  Negative for adenopathy.   Psychiatric/Behavioral:  Negative for hallucinations, sleep disturbance and suicidal ideas.        I have reviewed and confirmed the accuracy of the ROS as documented by the MA/LPN/RN Pablo Noyola MD      Objective   /86 (BP Location: Right arm, Patient Position: Sitting, Cuff Size: Adult)   Pulse 82   Temp 99.6 °F (37.6 °C) (Temporal)   Resp 18   Ht 182.9 cm (72\")   Wt (!) 141 kg (309 " lb 12.8 oz)   SpO2 93%   BMI 42.02 kg/m²   BP Readings from Last 3 Encounters:   01/21/25 134/86   01/07/25 151/99   12/30/24 152/95     Wt Readings from Last 3 Encounters:   01/21/25 (!) 141 kg (309 lb 12.8 oz)   01/07/25 (!) 136 kg (300 lb 12.8 oz)   12/30/24 (!) 137 kg (303 lb)     Physical Exam  Constitutional:       Appearance: Normal appearance. He is well-developed. He is not diaphoretic.   HENT:      Head: Normocephalic and atraumatic.      Right Ear: Tympanic membrane, ear canal and external ear normal.      Left Ear: Tympanic membrane, ear canal and external ear normal.      Nose: Nose normal.      Mouth/Throat:      Mouth: Mucous membranes are moist.   Eyes:      General: Lids are normal.      Extraocular Movements: Extraocular movements intact.      Conjunctiva/sclera: Conjunctivae normal.      Pupils: Pupils are equal, round, and reactive to light.   Neck:      Thyroid: No thyromegaly.      Vascular: No carotid bruit or JVD.      Trachea: No tracheal deviation.   Cardiovascular:      Rate and Rhythm: Normal rate and regular rhythm.      Heart sounds: Normal heart sounds. No murmur heard.     No friction rub. No gallop.   Pulmonary:      Effort: Pulmonary effort is normal.      Breath sounds: Normal breath sounds. No stridor. No decreased breath sounds, wheezing or rales.   Abdominal:      General: Bowel sounds are normal. There is no distension.      Palpations: Abdomen is soft. There is no mass.      Tenderness: There is no abdominal tenderness. There is no guarding or rebound.      Hernia: No hernia is present.   Musculoskeletal:      Thoracic back: Normal. No edema, deformity, lacerations, spasms or tenderness. Normal range of motion.      Lumbar back: No edema, deformity, spasms or tenderness. Normal range of motion.      Right hip: No deformity, tenderness or crepitus. Normal range of motion. Normal strength.      Left hip: Normal. No deformity, tenderness or crepitus. Normal range of motion.  "Normal strength.   Lymphadenopathy:      Head:      Right side of head: No submental, submandibular, tonsillar, preauricular, posterior auricular or occipital adenopathy.      Left side of head: No submental, submandibular, tonsillar, preauricular, posterior auricular or occipital adenopathy.      Cervical: No cervical adenopathy.   Skin:     General: Skin is warm and dry.      Coloration: Skin is not pale.   Neurological:      Mental Status: He is alert and oriented to person, place, and time.      Cranial Nerves: No cranial nerve deficit.      Sensory: No sensory deficit.      Motor: No tremor, atrophy, abnormal muscle tone or seizure activity.      Coordination: Coordination normal.      Gait: Gait normal.      Deep Tendon Reflexes: Reflexes are normal and symmetric.       Data / Lab Results:    Hemoglobin A1C   Date Value Ref Range Status   11/04/2024 8.96 (H) 4.80 - 5.60 % Final   11/04/2024 8.7 (A) 4.5 - 5.7 % Final   07/24/2024 8.0 (A) 4.5 - 5.7 % Final   03/19/2024 6.9 (A) 4.5 - 5.7 % Final     Lab Results   Component Value Date    Glucose 194 (H) 01/07/2025    Glucose, UA 3+ (A) 01/21/2025     Lab Results   Component Value Date    LDL 89 07/26/2024    LDL 76 07/25/2023    LDL 69 04/20/2022     Lab Results   Component Value Date    CHOL 223 (H) 09/25/2018    CHOL 211 (H) 12/11/2017    CHOL 197 09/24/2016     Lab Results   Component Value Date    TRIG 166 (H) 07/26/2024    TRIG 121 07/25/2023    TRIG 341 (H) 04/20/2022     Lab Results   Component Value Date    HDL 43 07/26/2024    HDL 44 07/25/2023    HDL 34 (L) 04/20/2022     No results found for: \"PSA\"  Lab Results   Component Value Date    WBC 9.46 11/18/2024    HGB 14.8 11/18/2024    HCT 43.3 11/18/2024    MCV 89.5 11/18/2024     11/18/2024     Lab Results   Component Value Date    TSH 2.150 07/26/2024      Lab Results   Component Value Date    GLUCOSE 240 (H) 11/18/2024    BUN 16 11/18/2024    CREATININE 1.05 11/18/2024    EGFRIFNONA 106 " "10/20/2020    EGFRIFAFRI 123 10/20/2020    BCR 15.2 11/18/2024    K 4.1 11/18/2024    CO2 31.2 (H) 11/18/2024    CALCIUM 9.4 11/18/2024    PROTENTOTREF 6.3 08/13/2024    ALBUMIN 4.2 08/13/2024    LABIL2 2.0 04/20/2022    AST 32 08/13/2024    ALT 45 (H) 08/13/2024     Lab Results   Component Value Date    GENO Negative 10/16/2019      No results found for: \"CRP\"   No results found for: \"IRON\", \"TIBC\", \"FERRITIN\"   Lab Results   Component Value Date    YAVXTXSQ30 504 12/11/2017          Assessment & Plan      Medications        Problem List         LOS    Physical.  Doing well.  Recommend update tetanus shot at [employer].  Flu vaccine declined.  Discussed health maintenance, screening tests, lifestyle modification.  Type 2 diabetes.  New onset.  Worse today, A1c 8 today, start Ozempic.  Discussed diet, exercise, lifestyle modification.  Has not been watching diet, restart, overall good control/diet controlled. Monitor blood sugars.  Consider nutritionist referral.  Follow-up 3 month.   Consider add lisinopril, plan ophthalmology referral.  Nephrolithiasis.  Improved/resolved currently. 4 mm left distal ureter per CT 4/20, multiple other stones bilateral kidneys 4 to 5 mm.  Increase fluid intake. Urology follow up declined check xray abd.   Knee pain.  Left, patellar tendinitis.  OA contributing.  IM steroids given.  Ice, NSAIDs, rehabilitation exercises discussed.  Call return if persistent symptoms.  Gallstone.  Small stone incidental finding per CT 4/20.  Asymptomatic.  Consider surgery referral if symptoms develop.  Hyperlipidemia.  Improved back on simvastatin,.  Familial with significant elevated triglycerides.  Improved on statin.  Follow-up recheck.  Fatty liver.  With mild elevation LFTs.  Continue fish oil.  Continue to monitor.  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  Recommend hematology follow-up, offered second opinion from alternative hematologist he declines currently.  " Clinically stable on Claritin/ranitidine.  Lumbar disc disease.  Undergoing PT currently, considering epidural injections.  Followed by [employer].  Asthma.  Doing well, no recent flares. baseline inhaler use currently.  Hypertension.  Much improved, normotensive today home blood pressure monitor results reviewed, tolerating increase lisinopril/addition HCTZ. Monitor bp at home, close follow up scheuled.  Consider increase rx if persistent elevation.  Overall good control on carvedilol.  Cardiolite stress testing benign 2012.  Carotid Dopplers normal 2015.  Discussed low-sodium diet.  Follow-up recheck.  Anxiety.  Recommend Rx he declines.  Family history of colon cancer.  His uncle.  Remote h/o colonoscoy recommend repeat / [facility] referral scheduled.   Groin sprain.  Benign exam today.  Ice, rehabilitation exercise discussed.  Start prednisone.  Call return if worsening symptoms.  CDL physical.  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  Low back pain.  Flare L DD, persistent symptoms today, exacerbated by falling on ice, rehabilitation exercises discussed.  Start gabapentin, continue nightly muscle relaxant.  Check x-ray, consider MRI/PT referral/referral for epidural injections.  Anal fissure/perirectal abscess.  Repair, complicated by formation of fistula, clinically improved/resolving today.  Healing well, advancing activity.  Followed by colorectal surgery..  Tinea cruris.  Start Lotrisone.    Previous Exams:  Last Carotid Dopplers was 9/11/15 ordered by Dr Noyola               · Impression of No Visible plaque or hemodynamically significant stenosis at the carotid bifurcations. Normal antegrade flow in both vertebral arteries  Last Stress Test was on 11/14/07 ordered by Dr Song.    · Impression of Negative stress test for inducible ischemia.   Last EKG was on 9/25/2018 ordered by Dr Noyola.    · Impression of Sinus Bradycardia   Last Echocardiogram was on 4/29/2012 ordered  by Dr Noyola.    · Impression of left atrium is of normal size and structure.    ·The right atrium is of normal size and structure.    ·The right ventricle appears to have normal overall size thickness and contractility.    ·The left ventricle appears to have normal overall size thickness and contractility.    ·The estimated ejection fraction is 68%.    ·There are no regional wall motion abnormalities identified.    ·The aortic valve is morphologically normal.    ·There is no aortic stenosis or regurgitation present.    ·The mitral valve is morphologically normal.    ·There is trace to mild mitral regurgitation detected.    ·The tricuspid regurgitation detected however normal.    ·There is trace tricuspid regurgitation detected.  Had a dexa scan on 2/27/13       Diagnoses and all orders for this visit:    1. Bilateral low back pain, unspecified chronicity, unspecified whether sciatica present (Primary)  -     POCT urinalysis dipstick, manual  -     XR Spine Lumbar Complete 4+VW  -     gabapentin (NEURONTIN) 300 MG capsule; Take 1 capsule by mouth 3 (Three) Times a Day As Needed (back pain).  Dispense: 90 capsule; Refill: 2    2. Class 3 severe obesity due to excess calories with serious comorbidity and body mass index (BMI) of 40.0 to 44.9 in adult    3. Tobacco use disorder    4. Lumbar disc disease        Class 3 Severe Obesity (BMI >=40). Obesity-related health conditions include the following: diabetes mellitus. Obesity is unchanged. BMI is is above average; BMI management plan is completed. We discussed portion control and increasing exercise.        Expected course, medications, and adverse effects discussed.  Call or return if worsening or persistent symptoms.  I wore protective equipment throughout this patient encounter including a mask, gloves, and eye protection.  Hand hygiene was performed before donning protective equipment and after removal when leaving the room. The complete contents of the  Assessment and Plan and Data/Lab Results as documented above have been reviewed and addressed by myself with the patient today as part of an ongoing evaluation / treatment plan.  If some of the documentation has been copied from a previous note and is unchanged it indicates that this problem / plan has been assessed today but is stable from a previous visit and no changes have been recommended.

## 2025-01-22 DIAGNOSIS — E78.00 HYPERCHOLESTEROLEMIA: ICD-10-CM

## 2025-01-22 DIAGNOSIS — I10 ESSENTIAL HYPERTENSION: ICD-10-CM

## 2025-01-22 RX ORDER — HYDROCHLOROTHIAZIDE 25 MG/1
25 TABLET ORAL DAILY
Qty: 90 TABLET | Refills: 0 | Status: SHIPPED | OUTPATIENT
Start: 2025-01-22

## 2025-01-22 RX ORDER — FENOFIBRATE 145 MG/1
145 TABLET, COATED ORAL DAILY
Qty: 90 TABLET | Refills: 0 | Status: SHIPPED | OUTPATIENT
Start: 2025-01-22

## 2025-01-25 PROBLEM — M51.9 LUMBAR DISC DISEASE: Status: ACTIVE | Noted: 2025-01-25

## 2025-02-03 ENCOUNTER — TELEPHONE (OUTPATIENT)
Dept: FAMILY MEDICINE CLINIC | Facility: CLINIC | Age: 50
End: 2025-02-03
Payer: COMMERCIAL

## 2025-02-03 DIAGNOSIS — M54.50 BILATERAL LOW BACK PAIN, UNSPECIFIED CHRONICITY, UNSPECIFIED WHETHER SCIATICA PRESENT: Primary | ICD-10-CM

## 2025-02-04 ENCOUNTER — TELEPHONE (OUTPATIENT)
Dept: FAMILY MEDICINE CLINIC | Facility: CLINIC | Age: 50
End: 2025-02-04
Payer: COMMERCIAL

## 2025-02-04 NOTE — TELEPHONE ENCOUNTER
Caller: Asad Odom    Relationship: Self    Best call back number: [phone]     Caller requesting test results:     What test was performed: XRAY    When was the test performed: 01/21/25    Where was the test performed: DOWNSTAIRS     Additional notes: PATIENT SEEN THE RESULTS ON MYCHART BUT HE DOESN'T UNDERSTAND WHAT THE RESULTS MEAN. PATIENT STATES THAT HE IS IN A LOT OF PAIN.

## 2025-02-05 NOTE — TELEPHONE ENCOUNTER
Patient returned my call and would like to proceed with MRI. I placed this order for patient. He would like this done soon as possible. I reached out to referral team to push this through for patient.

## 2025-02-05 NOTE — TELEPHONE ENCOUNTER
"I attempted to call patient with no answer. Sent [employer] Tracking message:    \"Let him know his x-ray does show wear-and-tear and degeneration in his low back as expected, go ahead and get him set up for an MRI of his L-spine without contrast, let him know it does also show that he has a small kidney stone in his left kidney, this 1 is up in his kidney so is not causing any problems currently\"  "

## 2025-02-12 ENCOUNTER — TELEPHONE (OUTPATIENT)
Dept: FAMILY MEDICINE CLINIC | Facility: CLINIC | Age: 50
End: 2025-02-12
Payer: COMMERCIAL

## 2025-02-12 NOTE — TELEPHONE ENCOUNTER
Caller: Asad Odom    Relationship: Self    Best call back number: [phone]      What was the call regarding: PATIENT CALLING STATES HE GOT DENIED FOR HIS MRI ON HIS BACK DUE TO CODING    PATIENT CALLED INSURANCE AND THEY TOLD HIM IT WAS CODED WRONG BY DOCTORS OFFICE AND HE NEEDS A NEW REFERRAL SENT OVER     PLEASE GIVE CALLBACK WITH ANY QUESTIONS

## 2025-03-10 ENCOUNTER — OFFICE VISIT (OUTPATIENT)
Dept: FAMILY MEDICINE CLINIC | Facility: CLINIC | Age: 50
End: 2025-03-10
Payer: COMMERCIAL

## 2025-03-10 ENCOUNTER — TELEPHONE (OUTPATIENT)
Dept: FAMILY MEDICINE CLINIC | Facility: CLINIC | Age: 50
End: 2025-03-10

## 2025-03-10 VITALS
OXYGEN SATURATION: 97 % | RESPIRATION RATE: 18 BRPM | TEMPERATURE: 98 F | BODY MASS INDEX: 41.88 KG/M2 | HEART RATE: 87 BPM | SYSTOLIC BLOOD PRESSURE: 132 MMHG | DIASTOLIC BLOOD PRESSURE: 82 MMHG | WEIGHT: 309.2 LBS | HEIGHT: 72 IN

## 2025-03-10 DIAGNOSIS — I10 ESSENTIAL HYPERTENSION: Chronic | ICD-10-CM

## 2025-03-10 DIAGNOSIS — E66.813 CLASS 3 SEVERE OBESITY DUE TO EXCESS CALORIES WITH SERIOUS COMORBIDITY AND BODY MASS INDEX (BMI) OF 40.0 TO 44.9 IN ADULT: ICD-10-CM

## 2025-03-10 DIAGNOSIS — E11.65 TYPE 2 DIABETES MELLITUS WITH HYPERGLYCEMIA, WITHOUT LONG-TERM CURRENT USE OF INSULIN: ICD-10-CM

## 2025-03-10 DIAGNOSIS — F17.200 TOBACCO USE DISORDER: ICD-10-CM

## 2025-03-10 DIAGNOSIS — J06.9 UPPER RESPIRATORY TRACT INFECTION, UNSPECIFIED TYPE: ICD-10-CM

## 2025-03-10 DIAGNOSIS — H92.09 EARACHE: Primary | ICD-10-CM

## 2025-03-10 DIAGNOSIS — E66.01 CLASS 3 SEVERE OBESITY DUE TO EXCESS CALORIES WITH SERIOUS COMORBIDITY AND BODY MASS INDEX (BMI) OF 40.0 TO 44.9 IN ADULT: ICD-10-CM

## 2025-03-10 LAB
EXPIRATION DATE: ABNORMAL
HBA1C MFR BLD: 8.9 % (ref 4.5–5.7)
Lab: ABNORMAL

## 2025-03-10 RX ORDER — NEOMYCIN SULFATE, POLYMYXIN B SULFATE, HYDROCORTISONE 3.5; 10000; 1 MG/ML; [USP'U]/ML; MG/ML
4 SOLUTION/ DROPS AURICULAR (OTIC) 4 TIMES DAILY
Qty: 10 ML | Refills: 0 | Status: SHIPPED | OUTPATIENT
Start: 2025-03-10

## 2025-03-10 RX ORDER — GLIPIZIDE 10 MG/1
10 TABLET, FILM COATED, EXTENDED RELEASE ORAL DAILY
Qty: 90 TABLET | Refills: 1 | Status: SHIPPED | OUTPATIENT
Start: 2025-03-10

## 2025-03-10 RX ORDER — AMOXICILLIN 500 MG/1
500 TABLET, FILM COATED ORAL 3 TIMES DAILY
Qty: 30 TABLET | Refills: 0 | Status: SHIPPED | OUTPATIENT
Start: 2025-03-10

## 2025-03-10 NOTE — PROGRESS NOTES
Subjective   Asad Odom is a 49 y.o. male.     Chief Complaint   Patient presents with    Earache     Left, had dental work a month ago on the left upper side       History of Present Illness  Asad had dental work on the upper left side about a month ago. He did go back to the dentist last week to have a check up on this and said to see PCP about possible ear infection. He complains of left jaw pain, headache, ear pain, fatigue.   Earache   There is pain in the left ear. This is a recurrent problem. The current episode started more than 1 month ago. The problem occurs constantly. There has been no fever. Associated symptoms include headaches, hearing loss and neck pain. Pertinent negatives include no abdominal pain, coughing, drainage, rhinorrhea or sore throat. He has tried acetaminophen for the symptoms. The treatment provided mild relief.            I personally reviewed and updated the patient's allergies, medications, problem list, and past medical, surgical, social, and family history. I have reviewed and confirmed the accuracy of the History of Present Illness and Review of Symptoms as documented by the MA/LPN/RN. Pablo Noyola MD    Family History   Problem Relation Age of Onset    Other Mother         suicide    Other Sister         Suicide sibilingd       Social History     Tobacco Use    Smoking status: Former     Current packs/day: 0.00     Average packs/day: 0.3 packs/day for 28.5 years (7.1 ttl pk-yrs)     Types: Cigarettes, Cigars     Start date: 1991     Quit date: 2019     Years since quittin.6     Passive exposure: Never    Smokeless tobacco: Current     Types: Chew    Tobacco comments:     Quit 18, but now chews off and on    Vaping Use    Vaping status: Never Used   Substance Use Topics    Alcohol use: Yes     Comment: occasional    Drug use: No       Past Surgical History:   Procedure Laterality Date    BONE BIOPSY      BONE MARROW BIOPSY      CYSTOSCOPY BLADDER STONE  LITHOTRIPSY      KNEE MENISCAL REPAIR Left     MANDIBLE FRACTURE SURGERY      RECTAL EXAMINATION UNDER ANESTHESIA N/A 1/7/2025    Procedure: FISTULOTOMY;  Surgeon: Luis Kulkarni MD;  Location: [facility] MAIN OR;  Service: General;  Laterality: N/A;    RECTAL SPHINCTEROPLASTY N/A 11/12/2024    Procedure: SPHINCTEROTOMY;  Surgeon: Luis Kulkarni MD;  Location: [facility] MAIN OR;  Service: General;  Laterality: N/A;       Patient Active Problem List   Diagnosis    Asthma    Dyslipidemia    Elevated serum tryptase    Essential hypertension    Mast cell disorder    Nephrolithiasis    Anxiety    GERD (gastroesophageal reflux disease)    Osteoarthritis    Carotid artery stenosis    AK (actinic keratosis)    Annual visit for general adult medical examination with abnormal findings    Tobacco use disorder    Class 3 severe obesity due to excess calories with serious comorbidity and body mass index (BMI) of 40.0 to 44.9 in adult    Arthritis    Elevated liver function tests    Acute right-sided low back pain without sciatica    Encounter for CDL (commercial driving license) exam    Hemorrhoids    Hypercholesterolemia    Elevated fasting blood sugar    Type 2 diabetes mellitus with hyperglycemia, without long-term current use of insulin    Generalized abdominal pain    Hordeolum internum of left upper eyelid    Myopia of left eye    Rectal pain    Anal fissure    Anal fistula, complex, persistent    Anal fissure and fistula    Lumbar disc disease         Current Outpatient Medications:     aspirin 81 MG tablet, Take 1 tablet by mouth., Disp: , Rfl:     carvedilol (COREG) 25 MG tablet, TAKE 1 TABLET BY MOUTH TWICE DAILY WITH MEALS, Disp: 180 tablet, Rfl: 0    cetirizine (zyrTEC) 5 MG tablet, Take 1 tablet by mouth., Disp: , Rfl:     CINNAMON PO, Take  by mouth., Disp: , Rfl:     clotrimazole-betamethasone (LOTRISONE) 1-0.05 % cream, Apply 1 Application topically to the appropriate area as directed 2 (Two) Times a Day As Needed  (rash)., Disp: 60 g, Rfl: 2    cyclobenzaprine (FLEXERIL) 10 MG tablet, Take 0.5-1 tablets by mouth Daily As Needed for Muscle Spasms., Disp: 30 tablet, Rfl: 2    diphenhydrAMINE (BENADRYL) 25 mg capsule, Take 1 capsule by mouth Every 6 (Six) Hours As Needed for Itching., Disp: , Rfl:     docusate sodium (COLACE) 50 MG capsule, Take 1 capsule by mouth 2 (Two) Times a Day., Disp: , Rfl:     famotidine (PEPCID) 20 MG tablet, Take 1 tablet by mouth 2 (Two) Times a Day., Disp: , Rfl:     fenofibrate (TRICOR) 145 MG tablet, Take 1 tablet by mouth once daily, Disp: 90 tablet, Rfl: 0    gabapentin (NEURONTIN) 300 MG capsule, Take 1 capsule by mouth 3 (Three) Times a Day As Needed (back pain)., Disp: 90 capsule, Rfl: 2    GARLIC PO, Take  by mouth., Disp: , Rfl:     glipizide (glipiZIDE XL) 5 MG ER tablet, Take 1 tablet by mouth Daily., Disp: 30 tablet, Rfl: 5    hydroCHLOROthiazide 25 MG tablet, Take 1 tablet by mouth once daily, Disp: 90 tablet, Rfl: 0    Hydrocort-Pramoxine, Perianal, (Analpram HC) 2.5-1 % rectal cream, Insert  into the rectum 3 (Three) Times a Day., Disp: 30 g, Rfl: 0    hydrocortisone 1 % cream, Apply 1 Application topically to the appropriate area as directed 2 (Two) Times a Day., Disp: 60 g, Rfl: 1    ketorolac (TORADOL) 10 MG tablet, Take 1 tablet by mouth Every 6 (Six) Hours As Needed for Moderate Pain., Disp: 20 tablet, Rfl: 1    lidocaine (XYLOCAINE) 2 % jelly, 0.5% NIFEDIPINE WITH 2% LIDOCAINE , APPLY TO RECTUM , 3-4 TIMES DAILY, Disp: 30 g, Rfl: 0    lisinopril (PRINIVIL,ZESTRIL) 40 MG tablet, Take 1 tablet by mouth once daily, Disp: 90 tablet, Rfl: 1    Misc Natural Products (BEET ROOT PO), Take  by mouth., Disp: , Rfl:     multivitamin with minerals tablet tablet, Take 1 tablet by mouth Daily., Disp: , Rfl:     simvastatin (ZOCOR) 40 MG tablet, TAKE 1 TABLET BY MOUTH ONCE DAILY AT NIGHT, Disp: 90 tablet, Rfl: 0    amoxicillin-clavulanate (AUGMENTIN) 875-125 MG per tablet, Take 1 tablet by  "mouth 2 (Two) Times a Day. (Patient not taking: Reported on 3/10/2025), Disp: 14 tablet, Rfl: 0    amoxicillin-clavulanate (AUGMENTIN) 875-125 MG per tablet, Take 1 tablet by mouth 2 (Two) Times a Day. (Patient not taking: Reported on 12/30/2024), Disp: 14 tablet, Rfl: 0    amoxicillin-clavulanate (AUGMENTIN) 875-125 MG per tablet, Take 1 tablet by mouth 2 (Two) Times a Day. (Patient not taking: Reported on 12/30/2024), Disp: 20 tablet, Rfl: 0         Review of Systems   Constitutional:  Negative for chills and diaphoresis.   HENT:  Positive for ear pain and hearing loss. Negative for rhinorrhea, sore throat, tinnitus, trouble swallowing and voice change.    Eyes:  Negative for visual disturbance.   Respiratory:  Negative for cough and shortness of breath.    Cardiovascular:  Negative for chest pain and palpitations.   Gastrointestinal:  Negative for abdominal pain and nausea.   Endocrine: Negative for polydipsia and polyphagia.   Genitourinary:  Negative for hematuria.   Musculoskeletal:  Positive for neck pain. Negative for neck stiffness.   Skin:  Negative for color change and pallor.   Allergic/Immunologic: Negative for immunocompromised state.   Neurological:  Negative for dizziness, seizures and syncope.   Hematological:  Negative for adenopathy.   Psychiatric/Behavioral:  Negative for sleep disturbance and suicidal ideas.    All other systems reviewed and are negative.      I have reviewed and confirmed the accuracy of the ROS as documented by the MA/LPN/RN Pablo Noyola MD      Objective   /82 (BP Location: Right arm, Patient Position: Sitting, Cuff Size: Adult)   Pulse 87   Temp 98 °F (36.7 °C) (Temporal)   Resp 18   Ht 182.9 cm (72.01\")   Wt (!) 140 kg (309 lb 3.2 oz)   SpO2 97%   BMI 41.93 kg/m²   BP Readings from Last 3 Encounters:   03/10/25 132/82   01/21/25 134/86   01/07/25 151/99     Wt Readings from Last 3 Encounters:   03/10/25 (!) 140 kg (309 lb 3.2 oz)   01/21/25 (!) 141 kg (309 lb " 12.8 oz)   01/07/25 (!) 136 kg (300 lb 12.8 oz)     Physical Exam  Constitutional:       Appearance: Normal appearance. He is well-developed. He is not diaphoretic.   HENT:      Head: Normocephalic.      Right Ear: Hearing, ear canal and external ear normal. A middle ear effusion is present. Tympanic membrane is erythematous.      Left Ear: Hearing, ear canal and external ear normal. A middle ear effusion is present. Tympanic membrane is erythematous.      Nose: Congestion present.      Right Sinus: Maxillary sinus tenderness and frontal sinus tenderness present.      Left Sinus: Maxillary sinus tenderness and frontal sinus tenderness present.      Mouth/Throat:      Pharynx: Posterior oropharyngeal erythema present.      Tonsils: No tonsillar abscesses. 1+ on the right. 1+ on the left.   Eyes:      General: Lids are normal.      Conjunctiva/sclera: Conjunctivae normal.      Pupils: Pupils are equal, round, and reactive to light.   Neck:      Meningeal: Brudzinski's sign and Kernig's sign absent.   Cardiovascular:      Rate and Rhythm: Normal rate and regular rhythm.      Pulses: Normal pulses.      Heart sounds: Normal heart sounds, S1 normal and S2 normal. No murmur heard.     No friction rub. No gallop.   Pulmonary:      Effort: Pulmonary effort is normal. No accessory muscle usage or respiratory distress.      Breath sounds: No stridor. Examination of the right-upper field reveals wheezing and rhonchi. Examination of the left-upper field reveals wheezing and rhonchi. Examination of the right-middle field reveals wheezing and rhonchi. Examination of the left-middle field reveals wheezing and rhonchi. Examination of the right-lower field reveals wheezing and rhonchi. Examination of the left-lower field reveals wheezing and rhonchi. Wheezing and rhonchi present. No decreased breath sounds or rales.   Abdominal:      General: Bowel sounds are normal. There is no distension.      Palpations: Abdomen is soft. There is  "no mass.      Tenderness: There is no abdominal tenderness.      Hernia: No hernia is present.   Skin:     General: Skin is warm and dry.      Coloration: Skin is not pale.   Neurological:      Mental Status: He is alert and oriented to person, place, and time.      Cranial Nerves: No cranial nerve deficit.      Coordination: Coordination normal.      Gait: Gait normal.         Data / Lab Results:    Hemoglobin A1C   Date Value Ref Range Status   03/10/2025 8.9 (A) 4.5 - 5.7 % Final   11/04/2024 8.96 (H) 4.80 - 5.60 % Final   11/04/2024 8.7 (A) 4.5 - 5.7 % Final   07/24/2024 8.0 (A) 4.5 - 5.7 % Final     Lab Results   Component Value Date    Glucose 194 (H) 01/07/2025    Glucose, UA 3+ (A) 01/21/2025     Lab Results   Component Value Date    LDL 89 07/26/2024    LDL 76 07/25/2023    LDL 69 04/20/2022     Lab Results   Component Value Date    CHOL 223 (H) 09/25/2018    CHOL 211 (H) 12/11/2017    CHOL 197 09/24/2016     Lab Results   Component Value Date    TRIG 166 (H) 07/26/2024    TRIG 121 07/25/2023    TRIG 341 (H) 04/20/2022     Lab Results   Component Value Date    HDL 43 07/26/2024    HDL 44 07/25/2023    HDL 34 (L) 04/20/2022     No results found for: \"PSA\"  Lab Results   Component Value Date    WBC 9.46 11/18/2024    HGB 14.8 11/18/2024    HCT 43.3 11/18/2024    MCV 89.5 11/18/2024     11/18/2024     Lab Results   Component Value Date    TSH 2.150 07/26/2024      Lab Results   Component Value Date    GLUCOSE 240 (H) 11/18/2024    BUN 16 11/18/2024    CREATININE 1.05 11/18/2024    EGFRIFNONA 106 10/20/2020    EGFRIFAFRI 123 10/20/2020    BCR 15.2 11/18/2024    K 4.1 11/18/2024    CO2 31.2 (H) 11/18/2024    CALCIUM 9.4 11/18/2024    ALBUMIN 4.2 08/13/2024    AST 32 08/13/2024    ALT 45 (H) 08/13/2024     Lab Results   Component Value Date    GENO Negative 10/16/2019      No results found for: \"CRP\"   No results found for: \"IRON\", \"TIBC\", \"FERRITIN\"   Lab Results   Component Value Date    PPIOJPUX35 504 " 12/11/2017          Assessment & Plan      Medications        Problem List         [facility] maintenance.  Doing well.  Recommend update tetanus shot at [employer] [facility].  Flu vaccine declined.  Discussed health maintenance, screening tests, lifestyle modification.  Type 2 diabetes.  New onset.  Persistent elevation today, A1c 8.9, tolerating glipizide, dose increased.  Insurance did not cover Ozempic.  Discussed diet, exercise, lifestyle modification.  Has not been watching diet, restart, overall good control/diet controlled. Monitor blood sugars.  Consider nutritionist referral.  Follow-up 3 month.   Consider add lisinopril, plan ophthalmology referral.  Nephrolithiasis.  Improved/resolved currently. 4 mm left distal ureter per CT 4/20, multiple other stones bilateral kidneys 4 to 5 mm.  Increase fluid intake. Urology follow up declined check xray abd.   Knee pain.  Left, patellar tendinitis.  OA contributing.  IM steroids given.  Ice, NSAIDs, rehabilitation exercises discussed.  Call return if persistent symptoms.  Gallstone.  Small stone incidental finding per CT 4/20.  Asymptomatic.  Consider surgery referral if symptoms develop.  Hyperlipidemia.  Improved back on simvastatin,.  Familial with significant elevated triglycerides.  Improved on statin.  Follow-up recheck.  Fatty liver.  With mild elevation LFTs.  Continue fish oil.  Continue to monitor.  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  Recommend hematology follow-up, offered second opinion from alternative hematologist he declines currently.  Clinically stable on Claritin/ranitidine.  Lumbar disc disease.  Undergoing PT currently, considering epidural injections.  Followed by [employer].  Asthma.  Doing well, no recent flares. baseline inhaler use currently.  Hypertension.  Much improved, normotensive today home blood pressure monitor results reviewed, tolerating increase lisinopril/addition HCTZ. Monitor bp at home, close follow  up scheuled.  Consider increase rx if persistent elevation.  Overall good control on carvedilol.  Cardiolite stress testing benign 2012.  Carotid Dopplers normal 2015.  Discussed low-sodium diet.  Follow-up recheck.  Anxiety.  Recommend Rx he declines.  Family history of colon cancer.  His uncle.  Remote h/o colonoscoy recommend repeat / [facility] referral scheduled.   Groin sprain.  Benign exam today.  Ice, rehabilitation exercise discussed.  Start prednisone.  Call return if worsening symptoms.  CDL physical.  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  Low back pain.  Flare L DD, persistent symptoms today, exacerbated by falling on ice, rehabilitation exercises discussed.  Start gabapentin, continue nightly muscle relaxant.  Check x-ray, consider MRI/PT referral/referral for epidural injections.  Anal fissure/perirectal abscess.  Repair, complicated by formation of fistula, clinically improved/resolving today.  Healing well, advancing activity.  Followed by colorectal surgery..  Tinea cruris.  Start Lotrisone.  Acute bacterial sinusitis.  Start antibiotics.  Increase fluid intake.  Call return if fever worsening symptoms.      Diagnoses and all orders for this visit:    1. Earache (Primary)    2. Class 3 severe obesity due to excess calories with serious comorbidity and body mass index (BMI) of 40.0 to 44.9 in adult    3. Tobacco use disorder    4. Type 2 diabetes mellitus with hyperglycemia, without long-term current use of insulin  -     POC Glycosylated Hemoglobin (Hb A1C)              Expected course, medications, and adverse effects discussed.  Call or return if worsening or persistent symptoms.  I wore protective equipment throughout this patient encounter including a mask, gloves, and eye protection.  Hand hygiene was performed before donning protective equipment and after removal when leaving the room. The complete contents of the Assessment and Plan and Data/Lab Results as  documented above have been reviewed and addressed by myself with the patient today as part of an ongoing evaluation / treatment plan.  If some of the documentation has been copied from a previous note and is unchanged it indicates that this problem / plan has been assessed today but is stable from a previous visit and no changes have been recommended.

## 2025-03-10 NOTE — TELEPHONE ENCOUNTER
Caller: Asad Odom    Relationship: Self    Best call back number: [phone]    What medication are you requesting: EAR DROPS     If a prescription is needed, what is your preferred pharmacy and phone number: [facility] - [place], [place] - [address]  - [phone] [facility] [phone] FX     Additional notes: PT WAS SEEN TODAY FOR EAR INFECTION AND WAS TOLD THAT PCP WAS SENDING IN EAR DROPS, BUT HE IS AT THE PHARMACY AND THEY DID NOT RECEIVE PRESCRIPTION.

## 2025-03-24 DIAGNOSIS — I10 ESSENTIAL HYPERTENSION: ICD-10-CM

## 2025-03-24 RX ORDER — CARVEDILOL 25 MG/1
25 TABLET ORAL 2 TIMES DAILY WITH MEALS
Qty: 180 TABLET | Refills: 0 | Status: SHIPPED | OUTPATIENT
Start: 2025-03-24

## 2025-03-26 DIAGNOSIS — E78.5 DYSLIPIDEMIA: ICD-10-CM

## 2025-03-26 RX ORDER — SIMVASTATIN 40 MG
40 TABLET ORAL NIGHTLY
Qty: 90 TABLET | Refills: 0 | Status: SHIPPED | OUTPATIENT
Start: 2025-03-26

## 2025-04-11 ENCOUNTER — TELEPHONE (OUTPATIENT)
Dept: FAMILY MEDICINE CLINIC | Facility: CLINIC | Age: 50
End: 2025-04-11
Payer: COMMERCIAL

## 2025-04-11 NOTE — TELEPHONE ENCOUNTER
MRI OF BACK ORDERED 2/2025. INSURANCE DENIED THIS.  I FILED AN APPEAL IN 3/2025. RECEIVED APPROVAL OF APPEAL TODAY.  NOTIFIED PATIENT  ROUTED MRI TO SCHEDULING

## 2025-04-23 DIAGNOSIS — M54.50 ACUTE BILATERAL LOW BACK PAIN, UNSPECIFIED WHETHER SCIATICA PRESENT: ICD-10-CM

## 2025-04-25 RX ORDER — CYCLOBENZAPRINE HCL 10 MG
TABLET ORAL
Qty: 30 TABLET | Refills: 0 | Status: SHIPPED | OUTPATIENT
Start: 2025-04-25

## 2025-04-27 DIAGNOSIS — I10 ESSENTIAL HYPERTENSION: ICD-10-CM

## 2025-04-27 DIAGNOSIS — E78.00 HYPERCHOLESTEROLEMIA: ICD-10-CM

## 2025-04-28 RX ORDER — FENOFIBRATE 145 MG/1
145 TABLET, FILM COATED ORAL DAILY
Qty: 90 TABLET | Refills: 0 | Status: SHIPPED | OUTPATIENT
Start: 2025-04-28

## 2025-04-28 RX ORDER — HYDROCHLOROTHIAZIDE 25 MG/1
25 TABLET ORAL DAILY
Qty: 90 TABLET | Refills: 0 | Status: SHIPPED | OUTPATIENT
Start: 2025-04-28

## 2025-05-03 ENCOUNTER — HOSPITAL ENCOUNTER (OUTPATIENT)
Dept: MRI IMAGING | Facility: HOSPITAL | Age: 50
Discharge: HOME | End: 2025-05-03
Payer: COMMERCIAL

## 2025-05-03 DIAGNOSIS — M54.50 BILATERAL LOW BACK PAIN, UNSPECIFIED CHRONICITY, UNSPECIFIED WHETHER SCIATICA PRESENT: ICD-10-CM

## 2025-05-03 PROCEDURE — 72148 MRI LUMBAR SPINE W/O DYE: CPT

## 2025-05-07 ENCOUNTER — TELEPHONE (OUTPATIENT)
Age: 50
End: 2025-05-07
Payer: COMMERCIAL

## 2025-05-07 NOTE — TELEPHONE ENCOUNTER
Caller: Asad Odom    Relationship to patient: Self    Best call back number: [phone]  PT CAN BE REACHED AT ANYTIME, IF NO ANSWER A DETAILED MSG CAN BE LEFT.     Chief complaint: EXT HEMORRHOIDS    Type of visit: OV    Requested date: ASAP     If rescheduling, when is the original appointment: N/A     Additional notes:FIRST AVAILABLE WITH DR ELLER IS JULY 2nd. PT IS CONCERNED WITH NOT BEING ABLE TO GET A PROCEDURE COMPLETED BEFORE AUG st DUE TO INS REASONS. PT IS ASKING IF HIS APPT CAN BE MOVED UP MUCH SOONER.

## 2025-05-08 NOTE — TELEPHONE ENCOUNTER
Spoke with patient. I explained I have nothing any sooner at this time. Will call him if a sooner appointment becomes available. Patient voiced understanding

## 2025-05-20 ENCOUNTER — TELEPHONE (OUTPATIENT)
Dept: FAMILY MEDICINE CLINIC | Facility: CLINIC | Age: 50
End: 2025-05-20
Payer: COMMERCIAL

## 2025-05-20 DIAGNOSIS — I10 ESSENTIAL HYPERTENSION: ICD-10-CM

## 2025-05-20 RX ORDER — LISINOPRIL 40 MG/1
40 TABLET ORAL DAILY
Qty: 90 TABLET | Refills: 0 | Status: SHIPPED | OUTPATIENT
Start: 2025-05-20

## 2025-05-20 NOTE — TELEPHONE ENCOUNTER
Caller: Asad Odom    Relationship: Self    Best call back number:     Asad Odom (Self) [phone] (Home)     What was the call regarding:     PATIENT IS WANTING TO HAVE A PROSTATE EXAM DONE AT HIS UPCOMING APPT IN JUNE WITH PCP    ADDED NOTE TO HIS APPT - BUT IF THIS NEEDS TO BE DONE ON A SEPARATE APPT, PLEASE CALL AND ADVISE     Is it okay if the provider responds through MyChart: CALL IF NEEDED

## 2025-06-11 NOTE — PROGRESS NOTES
Subjective   Asad Odom is a 49 y.o. male.     Chief Complaint   Patient presents with    Diabetes    Hypertension       Diabetes  Visit type:  Follow-up  Diabetes type:  Type 2  Associated symptoms:     fatigue      no blurred vision, no chest pain, no polydipsia and no polyphagia    Hypoglycemia symptoms:     headaches      no hunger, no pallor and no seizures    Current treatments:  Oral agent (monotherapy)  Treatment compliance:  All of the time  Blood glucose ranges (mg/dl) comment:  Asad states he does not check his blood sugars at home.   Current diet:  Generally healthy  Meal planning:  Avoidance of concentrated sweets  Exercise:  Never  ACE-I / ARB:  Is being taken  Eye exam current: yes (Dr. Otoole's)    Sees podiatrist: no    Hypertension  Chronicity:  Chronic  Onset:  More than 1 year ago  Associated symptoms: anxiety, headaches, malaise/fatigue and neck pain    Associated symptoms: no blurred vision, no chest pain, no palpitations and no shortness of breath    CAD risks:  Obesity, stress, smoking/tobacco exposure and diabetes mellitus  Current therapy:  Diuretics, ACE inhibitors and beta blockers  Compliance problems:  Exercise and diet           I personally reviewed and updated the patient's allergies, medications, problem list, and past medical, surgical, social, and family history. I have reviewed and confirmed the accuracy of the History of Present Illness and Review of Symptoms as documented by the MA/LPN/RN. Pablo Noyola MD    Family History   Problem Relation Age of Onset    Other Mother         suicide    Other Sister         Suicide sibilingd       Social History     Tobacco Use    Smoking status: Former     Current packs/day: 0.00     Average packs/day: 0.3 packs/day for 28.5 years (7.1 ttl pk-yrs)     Types: Cigarettes, Cigars     Start date: 1991     Quit date: 2019     Years since quittin.9     Passive exposure: Never    Smokeless tobacco: Former     Types: Chew     Quit  date: 7/23/2024    Tobacco comments:     Quit 7/16/19, but now chews off and on    Vaping Use    Vaping status: Never Used   Substance Use Topics    Alcohol use: Yes     Comment: occasional    Drug use: No       Past Surgical History:   Procedure Laterality Date    BONE BIOPSY      BONE MARROW BIOPSY      CYSTOSCOPY BLADDER STONE LITHOTRIPSY      KNEE MENISCAL REPAIR Left     MANDIBLE FRACTURE SURGERY      RECTAL EXAMINATION UNDER ANESTHESIA N/A 1/7/2025    Procedure: FISTULOTOMY;  Surgeon: Luis Kulkarni MD;  Location: [facility] MAIN OR;  Service: General;  Laterality: N/A;    RECTAL SPHINCTEROPLASTY N/A 11/12/2024    Procedure: SPHINCTEROTOMY;  Surgeon: Luis Kulkarni MD;  Location: [facility] MAIN OR;  Service: General;  Laterality: N/A;       Patient Active Problem List   Diagnosis    Asthma    Dyslipidemia    Elevated serum tryptase    Essential hypertension    Mast cell disorder    Nephrolithiasis    Anxiety    GERD (gastroesophageal reflux disease)    Osteoarthritis    Carotid artery stenosis    AK (actinic keratosis)    Annual visit for general adult medical examination with abnormal findings    Tobacco use disorder    Class 3 severe obesity due to excess calories with serious comorbidity and body mass index (BMI) of 40.0 to 44.9 in adult    Arthritis    Elevated liver function tests    Acute right-sided low back pain without sciatica    Encounter for CDL (commercial driving license) exam    Hemorrhoids    Hypercholesterolemia    Elevated fasting blood sugar    Type 2 diabetes mellitus with hyperglycemia, without long-term current use of insulin    Generalized abdominal pain    Hordeolum internum of left upper eyelid    Myopia of left eye    Rectal pain    Anal fissure    Anal fistula, complex, persistent    Anal fissure and fistula    Lumbar disc disease         Current Outpatient Medications:     aspirin 81 MG tablet, Take 1 tablet by mouth., Disp: , Rfl:     carvedilol (COREG) 25 MG tablet, TAKE 1 TABLET BY MOUTH  TWICE DAILY WITH MEALS, Disp: 180 tablet, Rfl: 0    cetirizine (zyrTEC) 5 MG tablet, Take 1 tablet by mouth., Disp: , Rfl:     CINNAMON PO, Take  by mouth., Disp: , Rfl:     cyclobenzaprine (FLEXERIL) 10 MG tablet, TAKE 1/2 TO 1 (ONE-HALF TO ONE) TABLET BY MOUTH ONCE DAILY AS NEEDED FOR MUSCLE SPASM, Disp: 30 tablet, Rfl: 0    diphenhydrAMINE (BENADRYL) 25 mg capsule, Take 1 capsule by mouth Every 6 (Six) Hours As Needed for Itching., Disp: , Rfl:     famotidine (PEPCID) 20 MG tablet, Take 1 tablet by mouth 2 (Two) Times a Day., Disp: , Rfl:     fenofibrate (TRICOR) 145 MG tablet, Take 1 tablet by mouth once daily, Disp: 90 tablet, Rfl: 0    gabapentin (NEURONTIN) 300 MG capsule, Take 1 capsule by mouth 3 (Three) Times a Day As Needed (back pain)., Disp: 90 capsule, Rfl: 2    GARLIC PO, Take  by mouth., Disp: , Rfl:     hydroCHLOROthiazide 25 MG tablet, Take 1 tablet by mouth once daily, Disp: 90 tablet, Rfl: 0    Hydrocort-Pramoxine, Perianal, (Analpram HC) 2.5-1 % rectal cream, Insert  into the rectum 3 (Three) Times a Day., Disp: 30 g, Rfl: 0    ketorolac (TORADOL) 10 MG tablet, Take 1 tablet by mouth Every 6 (Six) Hours As Needed for Moderate Pain., Disp: 20 tablet, Rfl: 1    lisinopril (PRINIVIL,ZESTRIL) 40 MG tablet, Take 1 tablet by mouth once daily, Disp: 90 tablet, Rfl: 0    Misc Natural Products (BEET ROOT PO), Take  by mouth., Disp: , Rfl:     multivitamin with minerals tablet tablet, Take 1 tablet by mouth Daily., Disp: , Rfl:     simvastatin (ZOCOR) 40 MG tablet, TAKE 1 TABLET BY MOUTH ONCE DAILY AT NIGHT, Disp: 90 tablet, Rfl: 0    clotrimazole-betamethasone (LOTRISONE) 1-0.05 % cream, Apply 1 Application topically to the appropriate area as directed 2 (Two) Times a Day As Needed (rash). (Patient not taking: Reported on 6/16/2025), Disp: 60 g, Rfl: 2    docusate sodium (COLACE) 50 MG capsule, Take 1 capsule by mouth 2 (Two) Times a Day. (Patient not taking: Reported on 6/16/2025), Disp: , Rfl:      empagliflozin (Jardiance) 25 MG tablet tablet, Take 1 tablet by mouth Daily., Disp: 90 tablet, Rfl: 3    glipizide (glipiZIDE XL) 10 MG 24 hr tablet, Take 2 tablets by mouth Daily., Disp: 180 tablet, Rfl: 3    hydrocortisone 1 % cream, Apply 1 Application topically to the appropriate area as directed 2 (Two) Times a Day. (Patient not taking: Reported on 6/16/2025), Disp: 60 g, Rfl: 1    lidocaine (XYLOCAINE) 2 % jelly, 0.5% NIFEDIPINE WITH 2% LIDOCAINE , APPLY TO RECTUM , 3-4 TIMES DAILY (Patient not taking: Reported on 6/16/2025), Disp: 30 g, Rfl: 0    neomycin-polymyxin-hydrocortisone (CORTISPORIN) 3.5-12130-9 otic solution, Administer 4 drops into both ears 4 (Four) Times a Day. (Patient not taking: Reported on 6/16/2025), Disp: 10 mL, Rfl: 0    PARoxetine (PAXIL) 10 MG tablet, Take 1 tablet by mouth Every Morning., Disp: 90 tablet, Rfl: 1    tamsulosin (FLOMAX) 0.4 MG capsule 24 hr capsule, Take 1 capsule by mouth Daily., Disp: 90 capsule, Rfl: 3         Review of Systems   Constitutional:  Positive for fatigue and malaise/fatigue. Negative for chills and diaphoresis.   HENT:  Negative for trouble swallowing and voice change.    Eyes:  Negative for blurred vision and visual disturbance.   Respiratory:  Negative for shortness of breath.    Cardiovascular:  Negative for chest pain and palpitations.   Gastrointestinal:  Negative for abdominal pain and nausea.   Endocrine: Negative for polydipsia and polyphagia.   Genitourinary:  Negative for hematuria.   Musculoskeletal:  Positive for neck pain. Negative for neck stiffness.   Skin:  Negative for color change and pallor.   Allergic/Immunologic: Negative for immunocompromised state.   Neurological:  Negative for seizures and syncope.   Hematological:  Negative for adenopathy.   Psychiatric/Behavioral:  Negative for hallucinations, sleep disturbance and suicidal ideas.    All other systems reviewed and are negative.      I have reviewed and confirmed the accuracy of  "the ROS as documented by the MA/LPN/RN Pablo Noyola MD      Objective   /88 (BP Location: Right arm, Patient Position: Sitting, Cuff Size: Large Adult)   Pulse 92   Temp 98 °F (36.7 °C) (Temporal)   Resp 20   Ht 182.9 cm (72\")   Wt (!) 138 kg (304 lb)   SpO2 97%   BMI 41.23 kg/m²   BP Readings from Last 3 Encounters:   06/20/25 151/89   06/16/25 132/88   03/10/25 132/82     Wt Readings from Last 3 Encounters:   06/20/25 (!) 138 kg (304 lb 3.8 oz)   06/16/25 (!) 138 kg (304 lb)   03/10/25 (!) 140 kg (309 lb 3.2 oz)     Physical Exam  Constitutional:       Appearance: Normal appearance. He is well-developed. He is not diaphoretic.   HENT:      Head: Normocephalic and atraumatic.      Right Ear: Tympanic membrane, ear canal and external ear normal.      Left Ear: Tympanic membrane, ear canal and external ear normal.      Nose: Nose normal.      Mouth/Throat:      Mouth: Mucous membranes are moist.   Eyes:      General: Lids are normal.      Extraocular Movements: Extraocular movements intact.      Conjunctiva/sclera: Conjunctivae normal.      Pupils: Pupils are equal, round, and reactive to light.   Neck:      Thyroid: No thyromegaly.      Vascular: No carotid bruit or JVD.      Trachea: No tracheal deviation.   Cardiovascular:      Rate and Rhythm: Normal rate and regular rhythm.      Heart sounds: Normal heart sounds. No murmur heard.     No friction rub. No gallop.   Pulmonary:      Effort: Pulmonary effort is normal.      Breath sounds: Normal breath sounds. No stridor. No decreased breath sounds, wheezing or rales.   Abdominal:      General: Bowel sounds are normal. There is no distension.      Palpations: Abdomen is soft. There is no mass.      Tenderness: There is no abdominal tenderness. There is no guarding or rebound.      Hernia: No hernia is present.   Lymphadenopathy:      Head:      Right side of head: No submental, submandibular, tonsillar, preauricular, posterior auricular or occipital " adenopathy.      Left side of head: No submental, submandibular, tonsillar, preauricular, posterior auricular or occipital adenopathy.      Cervical: No cervical adenopathy.   Skin:     General: Skin is warm and dry.      Coloration: Skin is not pale.   Neurological:      Mental Status: He is alert and oriented to person, place, and time.      Cranial Nerves: No cranial nerve deficit.      Sensory: No sensory deficit.      Coordination: Coordination normal.      Gait: Gait normal.      Deep Tendon Reflexes: Reflexes are normal and symmetric.         Data / Lab Results:    Hemoglobin A1C   Date Value Ref Range Status   06/16/2025 8.8 (A) 4.5 - 5.7 % Final   03/10/2025 8.9 (A) 4.5 - 5.7 % Final   11/04/2024 8.96 (H) 4.80 - 5.60 % Final   11/04/2024 8.7 (A) 4.5 - 5.7 % Final     Lab Results   Component Value Date    Glucose 247 (H) 06/17/2025    Glucose 194 (H) 01/07/2025    Glucose, UA 2+ (A) 06/16/2025     Lab Results   Component Value Date     (H) 06/17/2025    LDL 89 07/26/2024    LDL 76 07/25/2023     Lab Results   Component Value Date    CHOL 223 (H) 09/25/2018    CHOL 211 (H) 12/11/2017    CHOL 197 09/24/2016     Lab Results   Component Value Date    TRIG 317 (H) 06/17/2025    TRIG 166 (H) 07/26/2024    TRIG 121 07/25/2023     Lab Results   Component Value Date    HDL 40 06/17/2025    HDL 43 07/26/2024    HDL 44 07/25/2023     Lab Results   Component Value Date    PSA 0.4 06/17/2025     Lab Results   Component Value Date    WBC 5.5 06/17/2025    HGB 15.3 06/17/2025    HCT 47.6 06/17/2025    MCV 94 06/17/2025     06/17/2025     Lab Results   Component Value Date    TSH 2.040 06/17/2025      Lab Results   Component Value Date    GLUCOSE 247 (H) 06/17/2025    BUN 15 06/17/2025    CREATININE 1.09 06/17/2025    EGFRIFNONA 106 10/20/2020    EGFRIFAFRI 123 10/20/2020    BCR 14 06/17/2025    K 4.3 06/17/2025    CO2 22 06/17/2025    CALCIUM 9.3 06/17/2025    ALBUMIN 4.1 06/17/2025    AST 46 (H) 06/17/2025  "   ALT 64 (H) 06/17/2025     Lab Results   Component Value Date    GENO Negative 10/16/2019      No results found for: \"CRP\"   No results found for: \"IRON\", \"TIBC\", \"FERRITIN\"   Lab Results   Component Value Date    PYSZZCTH89 504 12/11/2017          Assessment & Plan      Medications        Problem List         [facility]    Health maintenance.  Doing well.  Recommend update tetanus shot at [employer].  Flu vaccine declined.  Discussed health maintenance, screening tests, lifestyle modification.  Type 2 diabetes.  New onset.  Persistent elevation today, A1c 8.9, tolerating glipizide, dose increased.  Insurance did not cover Ozempic, add Jardiance.  Discussed diet, exercise, lifestyle modification.  Has not been watching diet, restart, overall good control/diet controlled. Monitor blood sugars.  Consider nutritionist referral.  Follow-up 3 month.   Consider add lisinopril, plan ophthalmology referral.  Cardiac screening CT/vascular bundle scheduled  Nephrolithiasis.  Improved/resolved currently. 4 mm left distal ureter per CT 4/20, multiple other stones bilateral kidneys 4 to 5 mm.  Increase fluid intake. Urology follow up declined check xray abd.   Knee pain.  Left, patellar tendinitis.  OA contributing.  IM steroids given.  Ice, NSAIDs, rehabilitation exercises discussed.  Call return if persistent symptoms.  Gallstone.  Small stone incidental finding per CT 4/20.  Asymptomatic.  Consider surgery referral if symptoms develop.  Hyperlipidemia.  Improved back on simvastatin,.  Familial with significant elevated triglycerides.  Improved on statin.  Follow-up recheck.  Fatty liver.  With mild elevation LFTs.  Continue fish oil.  Continue to monitor.  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  Recommend hematology follow-up, offered second opinion from alternative hematologist he declines currently.  Clinically stable on Claritin/ranitidine.  Lumbar disc disease.  Undergoing PT currently, considering " epidural injections.  Followed by [employer].  Asthma.  Doing well, no recent flares. baseline inhaler use currently.  Hypertension.  Much improved, normotensive today home blood pressure monitor results reviewed, tolerating increase lisinopril/addition HCTZ. Monitor bp at home, close follow up scheuled.  Consider increase rx if persistent elevation.  Overall good control on carvedilol.  Cardiolite stress testing benign 2012.  Carotid Dopplers normal 2015.  Discussed low-sodium diet.  Follow-up recheck.  Anxiety.  Recommend Rx he declines.  Family history of colon cancer.  His uncle.  Remote h/o colonoscoy recommend repeat /Followed by colorectal surgery Dr. Kulkarni, recent fistula repair/revision, follow-up colonoscopy planned.  Groin sprain.  Benign exam today.  Ice, rehabilitation exercise discussed.  Start prednisone.  Call return if worsening symptoms.  CDL physical.  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  Low back pain.  Flare L DD, persistent symptoms today, exacerbated by falling on ice, rehabilitation exercises discussed.  Start gabapentin, continue nightly muscle relaxant.  Check x-ray, consider MRI/PT referral/referral for epidural injections.  Anal fissure/perirectal abscess.  Repair, complicated by formation of fistula, clinically improved/resolving today.  Healing well, advancing activity.  Followed by colorectal surgery..  Tinea cruris.  Start Lotrisone.  Acute bacterial sinusitis.  Start antibiotics.  Increase fluid intake.  Call return if fever worsening symptoms.  Prostate cancer screening.  Check PSA.  BPH.  Start Flomax.      Diagnoses and all orders for this visit:    1. Type 2 diabetes mellitus with hyperglycemia, without long-term current use of insulin (Primary)  -     POC Glycosylated Hemoglobin (Hb A1C)  -     Microalbumin / Creatinine Urine Ratio - Urine, Clean Catch  -     POC Urinalysis Dipstick  -     Lipid Panel With / Chol / HDL Ratio  -      Comprehensive Metabolic Panel  -     CBC & Differential  -     TSH  -     Discontinue: empagliflozin (Jardiance) 25 MG tablet tablet; Take 1 tablet by mouth Daily.  Dispense: 90 tablet; Refill: 3  -     Discontinue: glipizide (glipiZIDE XL) 10 MG 24 hr tablet; Take 2 tablets by mouth Daily.  Dispense: 180 tablet; Refill: 3    2. Essential hypertension  -     Lipid Panel With / Chol / HDL Ratio  -     Comprehensive Metabolic Panel  -     CBC & Differential  -     TSH    3. Class 3 severe obesity due to excess calories with serious comorbidity and body mass index (BMI) of 40.0 to 44.9 in adult    4. Dyslipidemia  -     Lipid Panel With / Chol / HDL Ratio  -     Comprehensive Metabolic Panel  -     CBC & Differential  -     TSH    5. Screening for malignant neoplasm of prostate  -     PSA Screen    6. Screening for cardiovascular condition  -     CT Cardiac Calcium Score Without Dye; Future  -     Vascular Screening (Bundle) CAR; Future    7. Benign prostatic hyperplasia with urinary frequency  -     Discontinue: tamsulosin (FLOMAX) 0.4 MG capsule 24 hr capsule; Take 1 capsule by mouth Daily.  Dispense: 90 capsule; Refill: 3    8. Anxiety  -     Discontinue: PARoxetine (PAXIL) 10 MG tablet; Take 1 tablet by mouth Every Morning.  Dispense: 90 tablet; Refill: 1    9. Other chest pain  -     Adult Transthoracic Echo Complete W/ Cont if Necessary Per Protocol; Future    10. Palpitation  -     Cancel: Cardiac Event Monitor (ELIZABETH) or Mobile Cardiac Outpatient Telemetry (MCT)  -     Holter Monitor - 72 Hour Up To 15 Days              Expected course, medications, and adverse effects discussed.  Call or return if worsening or persistent symptoms.  I wore protective equipment throughout this patient encounter including a mask, gloves, and eye protection.  Hand hygiene was performed before donning protective equipment and after removal when leaving the room. The complete contents of the Assessment and Plan and Data/Lab Results as  documented above have been reviewed and addressed by myself with the patient today as part of an ongoing evaluation / treatment plan.  If some of the documentation has been copied from a previous note and is unchanged it indicates that this problem / plan has been assessed today but is stable from a previous visit and no changes have been recommended.

## 2025-06-16 ENCOUNTER — OFFICE VISIT (OUTPATIENT)
Dept: FAMILY MEDICINE CLINIC | Facility: CLINIC | Age: 50
End: 2025-06-16
Payer: COMMERCIAL

## 2025-06-16 VITALS
TEMPERATURE: 98 F | DIASTOLIC BLOOD PRESSURE: 88 MMHG | SYSTOLIC BLOOD PRESSURE: 132 MMHG | HEART RATE: 92 BPM | BODY MASS INDEX: 41.17 KG/M2 | WEIGHT: 304 LBS | OXYGEN SATURATION: 97 % | RESPIRATION RATE: 20 BRPM | HEIGHT: 72 IN

## 2025-06-16 DIAGNOSIS — N40.1 BENIGN PROSTATIC HYPERPLASIA WITH URINARY FREQUENCY: ICD-10-CM

## 2025-06-16 DIAGNOSIS — R07.89 OTHER CHEST PAIN: ICD-10-CM

## 2025-06-16 DIAGNOSIS — Z13.6 SCREENING FOR CARDIOVASCULAR CONDITION: ICD-10-CM

## 2025-06-16 DIAGNOSIS — Z12.5 SCREENING FOR MALIGNANT NEOPLASM OF PROSTATE: ICD-10-CM

## 2025-06-16 DIAGNOSIS — R00.2 PALPITATION: ICD-10-CM

## 2025-06-16 DIAGNOSIS — I10 ESSENTIAL HYPERTENSION: ICD-10-CM

## 2025-06-16 DIAGNOSIS — F41.9 ANXIETY: ICD-10-CM

## 2025-06-16 DIAGNOSIS — R35.0 BENIGN PROSTATIC HYPERPLASIA WITH URINARY FREQUENCY: ICD-10-CM

## 2025-06-16 DIAGNOSIS — E78.5 DYSLIPIDEMIA: ICD-10-CM

## 2025-06-16 DIAGNOSIS — E66.813 CLASS 3 SEVERE OBESITY DUE TO EXCESS CALORIES WITH SERIOUS COMORBIDITY AND BODY MASS INDEX (BMI) OF 40.0 TO 44.9 IN ADULT: ICD-10-CM

## 2025-06-16 DIAGNOSIS — E11.65 TYPE 2 DIABETES MELLITUS WITH HYPERGLYCEMIA, WITHOUT LONG-TERM CURRENT USE OF INSULIN: Primary | ICD-10-CM

## 2025-06-16 LAB
BILIRUB BLD-MCNC: NEGATIVE MG/DL
CLARITY, POC: CLEAR
COLOR UR: YELLOW
EXPIRATION DATE: ABNORMAL
GLUCOSE UR STRIP-MCNC: ABNORMAL MG/DL
HBA1C MFR BLD: 8.8 % (ref 4.5–5.7)
KETONES UR QL: NEGATIVE
LEUKOCYTE EST, POC: NEGATIVE
Lab: ABNORMAL
NITRITE UR-MCNC: NEGATIVE MG/ML
PH UR: 6 [PH] (ref 5–8)
PROT UR STRIP-MCNC: ABNORMAL MG/DL
RBC # UR STRIP: NEGATIVE /UL
SP GR UR: 1.01 (ref 1–1.03)
UROBILINOGEN UR QL: NORMAL

## 2025-06-16 RX ORDER — PAROXETINE 10 MG/1
10 TABLET, FILM COATED ORAL EVERY MORNING
Qty: 90 TABLET | Refills: 1 | Status: SHIPPED | OUTPATIENT
Start: 2025-06-16 | End: 2025-06-18 | Stop reason: SDUPTHER

## 2025-06-16 RX ORDER — TAMSULOSIN HYDROCHLORIDE 0.4 MG/1
1 CAPSULE ORAL DAILY
Qty: 90 CAPSULE | Refills: 3 | Status: SHIPPED | OUTPATIENT
Start: 2025-06-16 | End: 2025-06-18 | Stop reason: SDUPTHER

## 2025-06-16 RX ORDER — GLIPIZIDE 10 MG/1
20 TABLET, FILM COATED, EXTENDED RELEASE ORAL DAILY
Qty: 180 TABLET | Refills: 3 | Status: SHIPPED | OUTPATIENT
Start: 2025-06-16 | End: 2025-06-18 | Stop reason: SDUPTHER

## 2025-06-17 ENCOUNTER — TELEPHONE (OUTPATIENT)
Dept: FAMILY MEDICINE CLINIC | Facility: CLINIC | Age: 50
End: 2025-06-17
Payer: COMMERCIAL

## 2025-06-17 ENCOUNTER — HOSPITAL ENCOUNTER (OUTPATIENT)
Dept: CARDIOLOGY | Facility: HOSPITAL | Age: 50
Discharge: HOME | End: 2025-06-17
Admitting: FAMILY MEDICINE
Payer: COMMERCIAL

## 2025-06-17 LAB
ALBUMIN/CREAT UR: 11 MG/G CREAT (ref 0–29)
CREAT UR-MCNC: 86.6 MG/DL
MICROALBUMIN UR-MCNC: 9.5 UG/ML

## 2025-06-17 PROCEDURE — 93246 EXT ECG>7D<15D RECORDING: CPT

## 2025-06-17 NOTE — TELEPHONE ENCOUNTER
Patient came in and I relayed the message about the heart monitor to him. He'd agreed and said he would be there at 11am.

## 2025-06-18 ENCOUNTER — TELEPHONE (OUTPATIENT)
Dept: FAMILY MEDICINE CLINIC | Facility: CLINIC | Age: 50
End: 2025-06-18
Payer: COMMERCIAL

## 2025-06-18 ENCOUNTER — HOSPITAL ENCOUNTER (OUTPATIENT)
Dept: CT IMAGING | Facility: HOSPITAL | Age: 50
Discharge: HOME | End: 2025-06-18

## 2025-06-18 ENCOUNTER — HOSPITAL ENCOUNTER (OUTPATIENT)
Dept: ULTRASOUND IMAGING | Facility: HOSPITAL | Age: 50
Discharge: HOME | End: 2025-06-18

## 2025-06-18 DIAGNOSIS — Z13.6 SCREENING FOR CARDIOVASCULAR CONDITION: ICD-10-CM

## 2025-06-18 DIAGNOSIS — E11.65 TYPE 2 DIABETES MELLITUS WITH HYPERGLYCEMIA, WITHOUT LONG-TERM CURRENT USE OF INSULIN: ICD-10-CM

## 2025-06-18 DIAGNOSIS — R35.0 BENIGN PROSTATIC HYPERPLASIA WITH URINARY FREQUENCY: ICD-10-CM

## 2025-06-18 DIAGNOSIS — F41.9 ANXIETY: ICD-10-CM

## 2025-06-18 DIAGNOSIS — N40.1 BENIGN PROSTATIC HYPERPLASIA WITH URINARY FREQUENCY: ICD-10-CM

## 2025-06-18 LAB
ALBUMIN SERPL-MCNC: 4.1 G/DL (ref 4.1–5.1)
ALP SERPL-CCNC: 84 IU/L (ref 44–121)
ALT SERPL-CCNC: 64 IU/L (ref 0–44)
AST SERPL-CCNC: 46 IU/L (ref 0–40)
BASOPHILS # BLD AUTO: 0.1 X10E3/UL (ref 0–0.2)
BASOPHILS NFR BLD AUTO: 2 %
BILIRUB SERPL-MCNC: 0.4 MG/DL (ref 0–1.2)
BUN SERPL-MCNC: 15 MG/DL (ref 6–24)
BUN/CREAT SERPL: 14 (ref 9–20)
CALCIUM SERPL-MCNC: 9.3 MG/DL (ref 8.7–10.2)
CHLORIDE SERPL-SCNC: 101 MMOL/L (ref 96–106)
CHOLEST SERPL-MCNC: 204 MG/DL (ref 100–199)
CHOLEST/HDLC SERPL: 5.1 RATIO (ref 0–5)
CO2 SERPL-SCNC: 22 MMOL/L (ref 20–29)
CREAT SERPL-MCNC: 1.09 MG/DL (ref 0.76–1.27)
EGFRCR SERPLBLD CKD-EPI 2021: 83 ML/MIN/1.73
EOSINOPHIL # BLD AUTO: 0.1 X10E3/UL (ref 0–0.4)
EOSINOPHIL NFR BLD AUTO: 2 %
ERYTHROCYTE [DISTWIDTH] IN BLOOD BY AUTOMATED COUNT: 12.9 % (ref 11.6–15.4)
GLOBULIN SER CALC-MCNC: 2.5 G/DL (ref 1.5–4.5)
GLUCOSE SERPL-MCNC: 247 MG/DL (ref 70–99)
HCT VFR BLD AUTO: 47.6 % (ref 37.5–51)
HDLC SERPL-MCNC: 40 MG/DL
HGB BLD-MCNC: 15.3 G/DL (ref 13–17.7)
IMM GRANULOCYTES # BLD AUTO: 0 X10E3/UL (ref 0–0.1)
IMM GRANULOCYTES NFR BLD AUTO: 0 %
LDLC SERPL CALC-MCNC: 109 MG/DL (ref 0–99)
LYMPHOCYTES # BLD AUTO: 2 X10E3/UL (ref 0.7–3.1)
LYMPHOCYTES NFR BLD AUTO: 37 %
MCH RBC QN AUTO: 30.2 PG (ref 26.6–33)
MCHC RBC AUTO-ENTMCNC: 32.1 G/DL (ref 31.5–35.7)
MCV RBC AUTO: 94 FL (ref 79–97)
MONOCYTES # BLD AUTO: 0.6 X10E3/UL (ref 0.1–0.9)
MONOCYTES NFR BLD AUTO: 12 %
NEUTROPHILS # BLD AUTO: 2.6 X10E3/UL (ref 1.4–7)
NEUTROPHILS NFR BLD AUTO: 47 %
PLATELET # BLD AUTO: 214 X10E3/UL (ref 150–450)
POTASSIUM SERPL-SCNC: 4.3 MMOL/L (ref 3.5–5.2)
PROT SERPL-MCNC: 6.6 G/DL (ref 6–8.5)
PSA SERPL-MCNC: 0.4 NG/ML (ref 0–4)
RBC # BLD AUTO: 5.07 X10E6/UL (ref 4.14–5.8)
SODIUM SERPL-SCNC: 139 MMOL/L (ref 134–144)
TRIGL SERPL-MCNC: 317 MG/DL (ref 0–149)
TSH SERPL DL<=0.005 MIU/L-ACNC: 2.04 UIU/ML (ref 0.45–4.5)
VLDLC SERPL CALC-MCNC: 55 MG/DL (ref 5–40)
WBC # BLD AUTO: 5.5 X10E3/UL (ref 3.4–10.8)

## 2025-06-18 PROCEDURE — VASCULARSCN2 VASCULAR SCREENING (BUNDLE) CAR: Performed by: INTERNAL MEDICINE

## 2025-06-18 PROCEDURE — 75571 CT HRT W/O DYE W/CA TEST: CPT

## 2025-06-18 PROCEDURE — 93799 UNLISTED CV SVC/PROCEDURE: CPT

## 2025-06-18 RX ORDER — TAMSULOSIN HYDROCHLORIDE 0.4 MG/1
1 CAPSULE ORAL DAILY
Qty: 90 CAPSULE | Refills: 3 | Status: SHIPPED | OUTPATIENT
Start: 2025-06-18

## 2025-06-18 RX ORDER — PAROXETINE 10 MG/1
10 TABLET, FILM COATED ORAL EVERY MORNING
Qty: 90 TABLET | Refills: 1 | Status: SHIPPED | OUTPATIENT
Start: 2025-06-18

## 2025-06-18 RX ORDER — GLIPIZIDE 10 MG/1
20 TABLET, FILM COATED, EXTENDED RELEASE ORAL DAILY
Qty: 180 TABLET | Refills: 3 | Status: SHIPPED | OUTPATIENT
Start: 2025-06-18

## 2025-06-18 NOTE — TELEPHONE ENCOUNTER
Asad came into office stating [facility] didn't receive his prescriptions that were sent over on 06/16/2025  empagliflozin (Jardiance) 25 MG tablet tablet [[id number]] (Order [id number])   PARoxetine (PAXIL) 10 MG tablet [[id number]] (Order [id number])  glipizide (glipiZIDE XL) 10 MG 24 hr tablet   tamsulosin (FLOMAX) 0.4 MG capsule 24 hr   It does show that it was confirmed by pharmacy (06/16/2025 5:06 PM EDT).   Patient would like us to please resubmit the 4 prescriptions please.

## 2025-06-19 LAB
BH CV VAS SCREENING CAROTID CCA LEFT: 85.2 CM/SEC
BH CV VAS SCREENING CAROTID CCA RIGHT: 92.1 CM/SEC
BH CV VAS SCREENING CAROTID ICA LEFT: 70.7 CM/SEC
BH CV VAS SCREENING CAROTID ICA RIGHT: 82.8 CM/SEC
BH CV XLRA MEAS - MID AO DIAM: 1.93 CM
BH CV XLRA MEAS - PAD LEFT ABI PT: 1.3
BH CV XLRA MEAS - PAD LEFT ARM: 127 MMHG
BH CV XLRA MEAS - PAD LEFT LEG PT: 169 MMHG
BH CV XLRA MEAS - PAD RIGHT ABI PT: 1.32
BH CV XLRA MEAS - PAD RIGHT ARM: 130 MMHG
BH CV XLRA MEAS - PAD RIGHT LEG PT: 172 MMHG
BH CV XLRA MEAS LEFT DIST CCA EDV: 22.6 CM/SEC
BH CV XLRA MEAS LEFT DIST CCA PSV: 85.2 CM/SEC
BH CV XLRA MEAS LEFT ICA/CCA RATIO: 0.83
BH CV XLRA MEAS LEFT PROX ICA EDV: 30.7 CM/SEC
BH CV XLRA MEAS LEFT PROX ICA PSV: 70.7 CM/SEC
BH CV XLRA MEAS RIGHT DIST CCA EDV: 26.6 CM/SEC
BH CV XLRA MEAS RIGHT DIST CCA PSV: 92.1 CM/SEC
BH CV XLRA MEAS RIGHT ICA/CCA RATIO: 0.9
BH CV XLRA MEAS RIGHT PROX ICA EDV: 15.1 CM/SEC
BH CV XLRA MEAS RIGHT PROX ICA PSV: 82.8 CM/SEC

## 2025-06-20 ENCOUNTER — TELEPHONE (OUTPATIENT)
Dept: FAMILY MEDICINE CLINIC | Facility: CLINIC | Age: 50
End: 2025-06-20
Payer: COMMERCIAL

## 2025-06-20 ENCOUNTER — HOSPITAL ENCOUNTER (OUTPATIENT)
Dept: CARDIOLOGY | Facility: HOSPITAL | Age: 50
Discharge: HOME | End: 2025-06-20
Admitting: FAMILY MEDICINE
Payer: COMMERCIAL

## 2025-06-20 VITALS
HEIGHT: 72 IN | SYSTOLIC BLOOD PRESSURE: 151 MMHG | WEIGHT: 304.24 LBS | HEART RATE: 75 BPM | BODY MASS INDEX: 41.21 KG/M2 | DIASTOLIC BLOOD PRESSURE: 89 MMHG

## 2025-06-20 DIAGNOSIS — R07.89 OTHER CHEST PAIN: ICD-10-CM

## 2025-06-20 LAB
AORTIC DIMENSIONLESS INDEX: 0.69 (DI)
AV MEAN PRESS GRAD SYS DOP V1V2: 5.8 MMHG
AV VMAX SYS DOP: 171 CM/SEC
BH CV ECHO MEAS - ACS: 1.98 CM
BH CV ECHO MEAS - AI P1/2T: 596.6 MSEC
BH CV ECHO MEAS - AO MAX PG: 11.7 MMHG
BH CV ECHO MEAS - AO ROOT DIAM: 2.7 CM
BH CV ECHO MEAS - AO V2 VTI: 33.9 CM
BH CV ECHO MEAS - AVA(I,D): 2.48 CM2
BH CV ECHO MEAS - EDV(CUBED): 132.9 ML
BH CV ECHO MEAS - EDV(MOD-SP4): 88.2 ML
BH CV ECHO MEAS - EF(MOD-SP4): 65 %
BH CV ECHO MEAS - ESV(CUBED): 41.4 ML
BH CV ECHO MEAS - ESV(MOD-SP4): 29.3 ML
BH CV ECHO MEAS - FS: 32.2 %
BH CV ECHO MEAS - IVS/LVPW: 0.96 CM
BH CV ECHO MEAS - IVSD: 1 CM
BH CV ECHO MEAS - LA DIMENSION: 3.3 CM
BH CV ECHO MEAS - LAT PEAK E' VEL: 15.7 CM/SEC
BH CV ECHO MEAS - LV DIASTOLIC VOL/BSA (35-75): 34.7 CM2
BH CV ECHO MEAS - LV MASS(C)D: 193.6 GRAMS
BH CV ECHO MEAS - LV MAX PG: 4.8 MMHG
BH CV ECHO MEAS - LV MEAN PG: 2.44 MMHG
BH CV ECHO MEAS - LV SYSTOLIC VOL/BSA (12-30): 11.5 CM2
BH CV ECHO MEAS - LV V1 MAX: 109.9 CM/SEC
BH CV ECHO MEAS - LV V1 VTI: 23.4 CM
BH CV ECHO MEAS - LVIDD: 5.1 CM
BH CV ECHO MEAS - LVIDS: 3.5 CM
BH CV ECHO MEAS - LVOT AREA: 3.6 CM2
BH CV ECHO MEAS - LVOT DIAM: 2.14 CM
BH CV ECHO MEAS - LVPWD: 1.04 CM
BH CV ECHO MEAS - MED PEAK E' VEL: 9.9 CM/SEC
BH CV ECHO MEAS - MR MAX PG: 57.7 MMHG
BH CV ECHO MEAS - MR MAX VEL: 379.5 CM/SEC
BH CV ECHO MEAS - MV A MAX VEL: 62.3 CM/SEC
BH CV ECHO MEAS - MV DEC SLOPE: 385.7 CM/SEC2
BH CV ECHO MEAS - MV DEC TIME: 0.24 SEC
BH CV ECHO MEAS - MV E MAX VEL: 91.6 CM/SEC
BH CV ECHO MEAS - MV E/A: 1.47
BH CV ECHO MEAS - MV MAX PG: 2.8 MMHG
BH CV ECHO MEAS - MV MEAN PG: 1.35 MMHG
BH CV ECHO MEAS - MV V2 VTI: 26.3 CM
BH CV ECHO MEAS - MVA(VTI): 3.2 CM2
BH CV ECHO MEAS - PA ACC TIME: 0.07 SEC
BH CV ECHO MEAS - PA V2 MAX: 107.3 CM/SEC
BH CV ECHO MEAS - PULM A REVS DUR: 0.1 SEC
BH CV ECHO MEAS - PULM A REVS VEL: 27.1 CM/SEC
BH CV ECHO MEAS - PULM DIAS VEL: 42.9 CM/SEC
BH CV ECHO MEAS - PULM S/D: 1.35
BH CV ECHO MEAS - PULM SYS VEL: 57.9 CM/SEC
BH CV ECHO MEAS - RAP SYSTOLE: 3 MMHG
BH CV ECHO MEAS - RVSP: 38.2 MMHG
BH CV ECHO MEAS - SV(LVOT): 84 ML
BH CV ECHO MEAS - SV(MOD-SP4): 58.9 ML
BH CV ECHO MEAS - SVI(LVOT): 33 ML/M2
BH CV ECHO MEAS - SVI(MOD-SP4): 23.1 ML/M2
BH CV ECHO MEAS - TR MAX PG: 35.2 MMHG
BH CV ECHO MEAS - TR MAX VEL: 247 CM/SEC
BH CV ECHO MEASUREMENTS AVERAGE E/E' RATIO: 7.16
BH CV XLRA - RV BASE: 3.5 CM
BH CV XLRA - RV MID: 1.84 CM
LV EF BIPLANE MOD: 60 %

## 2025-06-20 PROCEDURE — 93306 TTE W/DOPPLER COMPLETE: CPT

## 2025-06-20 NOTE — TELEPHONE ENCOUNTER
PA completed on CoverMyMeds on 6/20/2025 for Jardiance 25mg.    Approved today by [employer] 2017  CaseId:[id number];Status:Approved;Review Type:Prior Auth;Coverage Start Date:06/20/2025;Coverage End Date:12/31/2099;

## 2025-06-23 ENCOUNTER — TELEPHONE (OUTPATIENT)
Age: 50
End: 2025-06-23
Payer: COMMERCIAL

## 2025-06-23 NOTE — TELEPHONE ENCOUNTER
Caller: Asad Odom    Relationship to patient: Self    Best call back number: [phone]    Patient is needing: PATIENT CALLING TO CHECK ON STATUS OF PAPERWORK FOR INSURANCE COMPANY

## 2025-06-27 LAB
CV ZIO BASELINE AVG BPM: 82 BPM
CV ZIO BASELINE BPM HIGH: 133 BPM
CV ZIO BASELINE BPM LOW: 53 BPM
CV ZIO DEVICE ANALYSIS TIME: NORMAL
CV ZIO ECT SVE COUNT: 25 EPISODES
CV ZIO ECT SVE CPLT COUNT: 0 EPISODES
CV ZIO ECT SVE CPLT FREQ: 0
CV ZIO ECT SVE FREQ: NORMAL
CV ZIO ECT SVE TPLT COUNT: 0 EPISODES
CV ZIO ECT SVE TPLT FREQ: 0
CV ZIO ECT VE COUNT: 219 EPISODES
CV ZIO ECT VE CPLT COUNT: 6 EPISODES
CV ZIO ECT VE CPLT FREQ: NORMAL
CV ZIO ECT VE FREQ: NORMAL
CV ZIO ECT VE TPLT COUNT: 0 EPISODES
CV ZIO ECT VE TPLT FREQ: 0
CV ZIO ECTOPIC SVE COUPLET RAW PERCENT: 0 %
CV ZIO ECTOPIC SVE ISOLATED PERCENT: 0 %
CV ZIO ECTOPIC SVE TRIPLET RAW PERCENT: 0 %
CV ZIO ECTOPIC VE COUPLET RAW PERCENT: 0 %
CV ZIO ECTOPIC VE ISOLATED PERCENT: 0.03 %
CV ZIO ECTOPIC VE TRIPLET RAW PERCENT: 0 %
CV ZIO ENROLLMENT END: NORMAL
CV ZIO ENROLLMENT START: NORMAL
CV ZIO PATIENT EVENTS DIARIES: 0
CV ZIO PATIENT EVENTS TRIGGERS: 0
CV ZIO PAUSE COUNT: 0
CV ZIO PRESCRIPTION STATUS: NORMAL
CV ZIO SVT COUNT: 0
CV ZIO TOTAL  ENROLLMENT PERIOD: NORMAL
CV ZIO VT COUNT: 0

## 2025-07-02 ENCOUNTER — RESULTS FOLLOW-UP (OUTPATIENT)
Dept: FAMILY MEDICINE CLINIC | Facility: CLINIC | Age: 50
End: 2025-07-02
Payer: COMMERCIAL

## 2025-07-16 ENCOUNTER — TELEPHONE (OUTPATIENT)
Dept: FAMILY MEDICINE CLINIC | Facility: CLINIC | Age: 50
End: 2025-07-16
Payer: COMMERCIAL

## 2025-07-16 DIAGNOSIS — I10 ESSENTIAL HYPERTENSION: ICD-10-CM

## 2025-07-16 DIAGNOSIS — E78.00 HYPERCHOLESTEROLEMIA: ICD-10-CM

## 2025-07-16 DIAGNOSIS — E78.5 DYSLIPIDEMIA: ICD-10-CM

## 2025-07-16 DIAGNOSIS — M54.50 ACUTE BILATERAL LOW BACK PAIN, UNSPECIFIED WHETHER SCIATICA PRESENT: ICD-10-CM

## 2025-07-16 RX ORDER — FENOFIBRATE 145 MG/1
145 TABLET, FILM COATED ORAL DAILY
Qty: 90 TABLET | Refills: 0 | Status: SHIPPED | OUTPATIENT
Start: 2025-07-16

## 2025-07-16 RX ORDER — HYDROCHLOROTHIAZIDE 25 MG/1
25 TABLET ORAL DAILY
Qty: 90 TABLET | Refills: 0 | Status: SHIPPED | OUTPATIENT
Start: 2025-07-16

## 2025-07-16 RX ORDER — CARVEDILOL 25 MG/1
25 TABLET ORAL 2 TIMES DAILY WITH MEALS
Qty: 180 TABLET | Refills: 0 | Status: SHIPPED | OUTPATIENT
Start: 2025-07-16

## 2025-07-16 RX ORDER — CYCLOBENZAPRINE HCL 10 MG
TABLET ORAL
Qty: 30 TABLET | Refills: 0 | Status: SHIPPED | OUTPATIENT
Start: 2025-07-16

## 2025-07-16 RX ORDER — SIMVASTATIN 40 MG
40 TABLET ORAL NIGHTLY
Qty: 90 TABLET | Refills: 0 | Status: SHIPPED | OUTPATIENT
Start: 2025-07-16

## 2025-07-16 NOTE — TELEPHONE ENCOUNTER
"Patient was seen on 6/16/2025, and started on Paxil 10mg. I messaged patient to see how things were going and how he was tolerated the medication.    Patient states \"oh my god yes its helped so so much its leveled me out perfectly'  "

## 2025-07-24 DIAGNOSIS — E78.5 DYSLIPIDEMIA: ICD-10-CM

## 2025-07-25 RX ORDER — SIMVASTATIN 40 MG
40 TABLET ORAL NIGHTLY
Qty: 90 TABLET | Refills: 0 | Status: SHIPPED | OUTPATIENT
Start: 2025-07-25

## 2025-07-30 DIAGNOSIS — E78.00 HYPERCHOLESTEROLEMIA: ICD-10-CM

## 2025-07-31 RX ORDER — FENOFIBRATE 145 MG/1
145 TABLET, FILM COATED ORAL DAILY
Qty: 90 TABLET | Refills: 0 | Status: SHIPPED | OUTPATIENT
Start: 2025-07-31

## 2025-08-12 DIAGNOSIS — I10 ESSENTIAL HYPERTENSION: ICD-10-CM

## 2025-08-12 RX ORDER — LISINOPRIL 40 MG/1
40 TABLET ORAL DAILY
Qty: 90 TABLET | Refills: 0 | Status: SHIPPED | OUTPATIENT
Start: 2025-08-12

## (undated) DEVICE — THE STERILE CAMERA HANDLE COVER IS FOR USE WITH THE STERIS SURGICAL LIGHTING AND VISUALIZATION SYSTEMS.

## (undated) DEVICE — DRAPE,LAPAROTOMY,PCH,STERILE: Brand: MEDLINE

## (undated) DEVICE — PK MINOR LAPAROTOMY 50

## (undated) DEVICE — SYR LUERLOK 30CC

## (undated) DEVICE — POSTN ARM CRDL LAMIN PK/2

## (undated) DEVICE — THE STERILE LIGHT HANDLE COVER IS USED WITH STERIS SURGICAL LIGHTING AND VISUALIZATION SYSTEMS.

## (undated) DEVICE — NDL HYPO ECLPS SFTY 22G 1 1/2IN

## (undated) DEVICE — PAD,ABDOMINAL,5"X9",STERILE,LF,1/PK: Brand: MEDLINE INDUSTRIES, INC.

## (undated) DEVICE — PK MINOR LITHOTOMY 50

## (undated) DEVICE — LUBE JELLY SURGILUBE TB/FLIPCAP 4.25OZ

## (undated) DEVICE — GLOVE,SURG,SENSICARE SLT,LF,PF,6.5: Brand: MEDLINE

## (undated) DEVICE — CATH IV INSYTE AUTOGARD 14G 1 1/2IN ORNG

## (undated) DEVICE — MATERNITY KNIT PANTS,SEAMLESS: Brand: WINGS

## (undated) DEVICE — GAUZE,SPONGE,FLUFF,6"X6.75",STRL,5/TRAY: Brand: MEDLINE

## (undated) DEVICE — INTENDED FOR TISSUE SEPARATION, AND OTHER PROCEDURES THAT REQUIRE A SHARP SURGICAL BLADE TO PUNCTURE OR CUT.: Brand: BARD-PARKER ® CARBON RIB-BACK BLADES

## (undated) DEVICE — SYR 10ML

## (undated) DEVICE — KT SURG TURNOVER 050

## (undated) DEVICE — PENCL SMOKE/EVAC MEGADYNE TELESCP 15FT

## (undated) DEVICE — SOLUTION,WATER,IRRIGATION,1000ML,STERILE: Brand: MEDLINE

## (undated) DEVICE — GAUZE SPONGES,8 PLY: Brand: CURITY

## (undated) DEVICE — HDRST INTUB GENTLETOUCH SLOT 7IN RT

## (undated) DEVICE — PANTY KNIT MATERN L/XL

## (undated) DEVICE — ELECTRD BLD EZ CLN MOD XLNG 2.75IN

## (undated) DEVICE — LUBE JELLY SURGILUBE TB/FLIPCAP 4.5OZ

## (undated) DEVICE — GLV SURG BIOGEL SENSR LTX PF SZ7.5

## (undated) DEVICE — UNDRGLV SURG BIOGEL PIMICROINDICATOR SYNTH SZ7.5PF STRL PR/2

## (undated) DEVICE — COVER,MAYO STAND,STERILE: Brand: MEDLINE